# Patient Record
Sex: MALE | Race: OTHER | HISPANIC OR LATINO | URBAN - METROPOLITAN AREA
[De-identification: names, ages, dates, MRNs, and addresses within clinical notes are randomized per-mention and may not be internally consistent; named-entity substitution may affect disease eponyms.]

---

## 2017-01-04 ENCOUNTER — EMERGENCY (EMERGENCY)
Facility: HOSPITAL | Age: 42
LOS: 0 days | Discharge: HOME | End: 2017-01-05
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
## 2017-06-27 DIAGNOSIS — Z87.891 PERSONAL HISTORY OF NICOTINE DEPENDENCE: ICD-10-CM

## 2017-06-27 DIAGNOSIS — M25.512 PAIN IN LEFT SHOULDER: ICD-10-CM
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
## 2017-06-27 DIAGNOSIS — S42.492A OTHER DISPLACED FRACTURE OF LOWER END OF LEFT HUMERUS, INITIAL ENCOUNTER FOR CLOSED FRACTURE: ICD-10-CM
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
## 2017-11-22 ENCOUNTER — EMERGENCY (EMERGENCY)
Facility: HOSPITAL | Age: 42
LOS: 0 days | Discharge: HOME | End: 2017-11-22

## 2017-11-22 DIAGNOSIS — F10.10 ALCOHOL ABUSE, UNCOMPLICATED: ICD-10-CM

## 2017-11-22 DIAGNOSIS — M54.9 DORSALGIA, UNSPECIFIED: ICD-10-CM

## 2017-11-22 DIAGNOSIS — F17.200 NICOTINE DEPENDENCE, UNSPECIFIED, UNCOMPLICATED: ICD-10-CM

## 2017-11-22 DIAGNOSIS — F11.20 OPIOID DEPENDENCE, UNCOMPLICATED: ICD-10-CM

## 2017-11-22 DIAGNOSIS — Z79.899 OTHER LONG TERM (CURRENT) DRUG THERAPY: ICD-10-CM

## 2017-11-22 DIAGNOSIS — F10.20 ALCOHOL DEPENDENCE, UNCOMPLICATED: ICD-10-CM

## 2024-06-13 ENCOUNTER — EMERGENCY (EMERGENCY)
Facility: HOSPITAL | Age: 49
LOS: 0 days | Discharge: LEFT BEFORE DISCHARGE | End: 2024-06-13
Attending: STUDENT IN AN ORGANIZED HEALTH CARE EDUCATION/TRAINING PROGRAM
Payer: MEDICAID

## 2024-06-13 VITALS
OXYGEN SATURATION: 97 % | RESPIRATION RATE: 18 BRPM | TEMPERATURE: 99 F | DIASTOLIC BLOOD PRESSURE: 95 MMHG | SYSTOLIC BLOOD PRESSURE: 137 MMHG | HEART RATE: 89 BPM

## 2024-06-13 DIAGNOSIS — S81.801A UNSPECIFIED OPEN WOUND, RIGHT LOWER LEG, INITIAL ENCOUNTER: ICD-10-CM

## 2024-06-13 DIAGNOSIS — Z53.29 PROCEDURE AND TREATMENT NOT CARRIED OUT BECAUSE OF PATIENT'S DECISION FOR OTHER REASONS: ICD-10-CM

## 2024-06-13 DIAGNOSIS — K72.90 HEPATIC FAILURE, UNSPECIFIED WITHOUT COMA: ICD-10-CM
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
## 2024-06-13 DIAGNOSIS — R17 UNSPECIFIED JAUNDICE: ICD-10-CM

## 2024-06-13 LAB
ALBUMIN SERPL ELPH-MCNC: 2.6 G/DL — LOW (ref 3.5–5.2)
ALP SERPL-CCNC: 532 U/L — HIGH (ref 30–115)
ALT FLD-CCNC: 73 U/L — HIGH (ref 0–41)
ANION GAP SERPL CALC-SCNC: 14 MMOL/L — SIGNIFICANT CHANGE UP (ref 7–14)
AST SERPL-CCNC: 291 U/L — HIGH (ref 0–41)
BASOPHILS # BLD AUTO: 0.14 K/UL — SIGNIFICANT CHANGE UP (ref 0–0.2)
BASOPHILS NFR BLD AUTO: 1.1 % — HIGH (ref 0–1)
BILIRUB DIRECT SERPL-MCNC: >17.2 MG/DL — HIGH (ref 0–0.3)
BILIRUB INDIRECT FLD-MCNC: <7.3 MG/DL — HIGH (ref 0.2–1.2)
BILIRUB SERPL-MCNC: 24.5 MG/DL — CRITICAL HIGH (ref 0.2–1.2)
BUN SERPL-MCNC: 7 MG/DL — LOW (ref 10–20)
CALCIUM SERPL-MCNC: 8 MG/DL — LOW (ref 8.4–10.4)
CHLORIDE SERPL-SCNC: 96 MMOL/L — LOW (ref 98–110)
CO2 SERPL-SCNC: 25 MMOL/L — SIGNIFICANT CHANGE UP (ref 17–32)
CREAT SERPL-MCNC: 1 MG/DL — SIGNIFICANT CHANGE UP (ref 0.7–1.5)
EGFR: 93 ML/MIN/1.73M2 — SIGNIFICANT CHANGE UP
EOSINOPHIL # BLD AUTO: 0.08 K/UL — SIGNIFICANT CHANGE UP (ref 0–0.7)
EOSINOPHIL NFR BLD AUTO: 0.6 % — SIGNIFICANT CHANGE UP (ref 0–8)
GAS PNL BLDV: SIGNIFICANT CHANGE UP
GLUCOSE SERPL-MCNC: 66 MG/DL — LOW (ref 70–99)
HCT VFR BLD CALC: 31.8 % — LOW (ref 42–52)
HGB BLD-MCNC: 10.9 G/DL — LOW (ref 14–18)
IMM GRANULOCYTES NFR BLD AUTO: 2.6 % — HIGH (ref 0.1–0.3)
LIDOCAIN IGE QN: 24 U/L — SIGNIFICANT CHANGE UP (ref 7–60)
LYMPHOCYTES # BLD AUTO: 1.73 K/UL — SIGNIFICANT CHANGE UP (ref 1.2–3.4)
LYMPHOCYTES # BLD AUTO: 14 % — LOW (ref 20.5–51.1)
MAGNESIUM SERPL-MCNC: 2 MG/DL — SIGNIFICANT CHANGE UP (ref 1.8–2.4)
MCHC RBC-ENTMCNC: 34.3 G/DL — SIGNIFICANT CHANGE UP (ref 32–37)
MCHC RBC-ENTMCNC: 41 PG — HIGH (ref 27–31)
MCV RBC AUTO: 119.5 FL — HIGH (ref 80–94)
MONOCYTES # BLD AUTO: 0.9 K/UL — HIGH (ref 0.1–0.6)
MONOCYTES NFR BLD AUTO: 7.3 % — SIGNIFICANT CHANGE UP (ref 1.7–9.3)
NEUTROPHILS # BLD AUTO: 9.15 K/UL — HIGH (ref 1.4–6.5)
NEUTROPHILS NFR BLD AUTO: 74.4 % — SIGNIFICANT CHANGE UP (ref 42.2–75.2)
NRBC # BLD: 0 /100 WBCS — SIGNIFICANT CHANGE UP (ref 0–0)
PLATELET # BLD AUTO: 179 K/UL — SIGNIFICANT CHANGE UP (ref 130–400)
PMV BLD: 11.9 FL — HIGH (ref 7.4–10.4)
POTASSIUM SERPL-MCNC: 3.8 MMOL/L — SIGNIFICANT CHANGE UP (ref 3.5–5)
POTASSIUM SERPL-SCNC: 3.8 MMOL/L — SIGNIFICANT CHANGE UP (ref 3.5–5)
PROT SERPL-MCNC: 5.8 G/DL — LOW (ref 6–8)
RBC # BLD: 2.66 M/UL — LOW (ref 4.7–6.1)
RBC # FLD: 16 % — HIGH (ref 11.5–14.5)
SODIUM SERPL-SCNC: 135 MMOL/L — SIGNIFICANT CHANGE UP (ref 135–146)
WBC # BLD: 12.32 K/UL — HIGH (ref 4.8–10.8)
WBC # FLD AUTO: 12.32 K/UL — HIGH (ref 4.8–10.8)

## 2024-06-13 PROCEDURE — 99285 EMERGENCY DEPT VISIT HI MDM: CPT

## 2024-06-13 PROCEDURE — 36415 COLL VENOUS BLD VENIPUNCTURE: CPT

## 2024-06-13 PROCEDURE — 99284 EMERGENCY DEPT VISIT MOD MDM: CPT | Mod: 25

## 2024-06-13 PROCEDURE — 84295 ASSAY OF SERUM SODIUM: CPT

## 2024-06-13 PROCEDURE — 80076 HEPATIC FUNCTION PANEL: CPT

## 2024-06-13 PROCEDURE — 85014 HEMATOCRIT: CPT

## 2024-06-13 PROCEDURE — 82330 ASSAY OF CALCIUM: CPT

## 2024-06-13 PROCEDURE — 74177 CT ABD & PELVIS W/CONTRAST: CPT | Mod: 26,MC

## 2024-06-13 PROCEDURE — 85018 HEMOGLOBIN: CPT

## 2024-06-13 PROCEDURE — 84132 ASSAY OF SERUM POTASSIUM: CPT

## 2024-06-13 PROCEDURE — 85025 COMPLETE CBC W/AUTO DIFF WBC: CPT

## 2024-06-13 PROCEDURE — 74177 CT ABD & PELVIS W/CONTRAST: CPT | Mod: MC

## 2024-06-13 PROCEDURE — 83605 ASSAY OF LACTIC ACID: CPT

## 2024-06-13 PROCEDURE — 83735 ASSAY OF MAGNESIUM: CPT

## 2024-06-13 PROCEDURE — 82962 GLUCOSE BLOOD TEST: CPT

## 2024-06-13 PROCEDURE — 80048 BASIC METABOLIC PNL TOTAL CA: CPT

## 2024-06-13 PROCEDURE — 83690 ASSAY OF LIPASE: CPT

## 2024-06-13 RX ORDER — CEPHALEXIN 500 MG
500 CAPSULE ORAL ONCE
Refills: 0 | Status: DISCONTINUED | OUTPATIENT
Start: 2024-06-13 | End: 2024-06-13

## 2024-06-13 RX ORDER — CEPHALEXIN 500 MG
1 CAPSULE ORAL
Qty: 28 | Refills: 0
Start: 2024-06-13 | End: 2024-06-19

## 2024-06-13 NOTE — ED ADULT NURSE NOTE - OBJECTIVE STATEMENT
48y male c/o lower right leg wound that won't heal after injury 2.5 weeks ago, pt report heat/pain on the injury site. denies n/v/d/fever/chills at home. pt alert and awake speaking in full sentences. 48y male c/o lower right leg wound that won't heal after injury 2.5 weeks ago, pt report heat/pain on the injury site. denies n/v/d/fever/chills at home. pt alert and awake speaking in full sentences. pt appear juandice on skin.

## 2024-06-13 NOTE — ED PROVIDER NOTE - PATIENT PORTAL LINK FT
You can access the FollowMyHealth Patient Portal offered by [facility] by registering at the following website: [email or web address]. By joining [employer]’s FollowMyHealth portal, you will also be able to view your health information using other applications (apps) compatible with our system.

## 2024-06-13 NOTE — ED PROVIDER NOTE - OBJECTIVE STATEMENT
48-year-old male with past medical history EtOH abuse and heroin abuse who presents to the ED with left lower leg wound.  Reports that he noticed the wound since 2 weeks ago and is not healing, so came to the ED for evaluation.  Denies trauma or injury to that area.  Reports that he drinks 1 pint of vodka daily and last alcohol intake was earlier this morning.  Also reports that he snorts heroin and never injecting himself.  Denies fever, shortness of breath, chest pain, nausea, vomiting, abdominal pain, urinary symptoms, and change with bowel movement.

## 2024-06-13 NOTE — ED PROVIDER NOTE - NSFOLLOWUPINSTRUCTIONS_ED_ALL_ED_FT
Follow up with your liver doctor or return to hospital as soon as possible because your liver function tests are very abnormal     Cirrhosis    Cirrhosis is long-term (chronic) liver injury. The liver is the body's largest internal organ, and it performs many functions. It converts food into energy, removes toxic material from the blood, makes important proteins, and absorbs necessary vitamins from food.    In cirrhosis, healthy liver cells are replaced by scar tissue. This prevents blood from flowing through the liver and makes it difficult for the liver to complete its functions.    What are the causes?  Common causes of this condition are hepatitis C and long-term alcohol abuse. Other causes include:  Nonalcoholic fatty liver disease (NAFLD). This happens when fat is deposited in the liver by causes other than alcohol.  Hepatitis B infection.  Autoimmune hepatitis. In this condition, the body's defense system (immune system) mistakenly attacks the liver cells, causing inflammation.  Diseases that cause blockage of ducts inside the liver.  Inherited liver diseases, such as hemochromatosis. This is one of the most common inherited liver diseases. In this disease, deposits of iron collect in the liver and other organs.  Reactions to certain long-term medicines, such as amiodarone, a heart medicine.  Parasitic infections. These include schistosomiasis, which is caused by a flatworm.  Long-term contact to certain toxins. These toxins include certain organic solvents, such as toluene and chloroform.  What increases the risk?  You are more likely to develop this condition if:  You have certain types of viral hepatitis.  You abuse alcohol, especially if you are female.  You are overweight.  You use IV drugs and share needles.  You have unprotected sex with someone who has viral hepatitis.  What are the signs or symptoms?  You may not have any signs and symptoms at first. Symptoms may not develop until the damage to your liver starts to get worse.    Early symptoms may include:  Weakness and tiredness (fatigue).  Changes in sleep patterns or having trouble sleeping.  Itchiness.  Tenderness in the right-upper part of your abdomen.  Weight loss and muscle loss.  Nausea.  Loss of appetite.  Later symptoms may include:  Fatigue or weakness that is getting worse.  Yellow skin and eyes (jaundice).  Buildup of fluid in the abdomen (ascites). You may notice that your clothes are tight around your waist.  Weight gain and swelling of the feet and ankles (edema).  Trouble breathing.  Easy bruising and bleeding.  Vomiting blood, or black or bloody stool.  Mental confusion.  How is this diagnosed?  Your health care provider may suspect cirrhosis based on your symptoms and medical history, especially if you have other medical conditions or a history of alcohol abuse. Your health care provider will do a physical exam to feel your liver and to check for signs of cirrhosis. Tests may include:  Blood tests to check:  For hepatitis B or C.  Kidney function.  Liver function.  Imaging tests such as:  MRI or CT scan to look for changes seen in advanced cirrhosis.  Ultrasound to see if normal liver tissue is being replaced by scar tissue.  A procedure in which a long needle is used to take a sample of liver tissue to be checked in a lab (biopsy). Liver biopsy can confirm the diagnosis of cirrhosis.  How is this treated?  Treatment for this condition depends on how damaged your liver is and what caused the damage. It may include treating the symptoms of cirrhosis, or treating the underlying causes to slow the damage. Treatment may include:  Making lifestyle changes, such as:  Eating a healthy diet. You may need to work with your health care provider or a dietitian to develop an eating plan.  Restricting salt intake.  Maintaining a healthy weight.  Not abusing drugs or alcohol.  Taking medicines to:  Treat liver infections or other infections.  Control itching.  Reduce fluid buildup.  Reduce certain blood toxins.  Reduce risk of bleeding from enlarged blood vessels in the stomach or esophagus (varices).  Liver transplant. In this procedure, a liver from a donor is used to replace your diseased liver. This is done if cirrhosis has caused liver failure.  Other treatments and procedures may be done depending on the problems that you get from cirrhosis. Common problems include liver-related kidney failure (hepatorenal syndrome).    Follow these instructions at home:    Take medicines only as told by your health care provider. Do not use medicines that are toxic to your liver. Ask your health care provider before taking any new medicines, including over-the-counter medicines such as NSAIDs.  Rest as needed.  Eat a well-balanced diet.  Limit your salt or water intake, if your health care provider asks you to do this.  Do not drink alcohol. This is especially important if you routinely take acetaminophen.  Keep all follow-up visits. This is important.  Contact a health care provider if you:  Have fatigue or weakness that is getting worse.  Develop swelling of the hands, feet, or legs, or a buildup of fluid in the abdomen (ascites).  Have a fever or chills.  Develop loss of appetite.  Have nausea or vomiting.  Develop jaundice.  Develop easy bruising or bleeding.  Get help right away if you:  Vomit bright red blood or a material that looks like coffee grounds.  Have blood in your stools.  Notice that your stools appear black and tarry.  Become confused.  Have chest pain or trouble breathing.  These symptoms may represent a serious problem that is an emergency. Do not wait to see if the symptoms will go away. Get medical help right away. Call your local emergency services (911 in the [place] Do not drive yourself to the hospital.    Summary  Cirrhosis is chronic liver injury. Common causes are hepatitis C and long-term alcohol abuse.  Tests used to diagnose cirrhosis include blood tests, imaging tests, and liver biopsy.  Treatment for this condition involves treating the underlying cause. Avoid alcohol, drugs, salt, and medicines that may damage your liver.  Get help right away if you vomit bright red blood or a material that looks like coffee grounds.  This information is not intended to replace advice given to you by your health care provider. Make sure you discuss any questions you have with your health care provider.

## 2024-06-13 NOTE — ED PROVIDER NOTE - PROGRESS NOTE DETAILS
Authored by Dr. Rajendra Kennedy, emergency medicine attending physician- I discussed results w/ pt, concern for decompensated cirrhosis/jaundice. pt states he cannot stay overnight because he has a dog but he will come back tomorrow. will ama, pt w/ capacity to refuse. I and/or my team has had an extensive discussion of risks and benefits of pursuing further medical evaluation and/or care with patient and any available family/friends, including but not limited to worsening of clinical status, disability, and death; patient still electing to leave against medical advice. Patient is awake, alert, oriented and demonstrates full capacity and insight into illness. Patient aware and encouraged to return immediately to this ED or nearest ED if patient decides to change mind regarding care or if patient experiences any new, worsening, or concerning symptoms. Any available test results were discussed with patient and/or family. Verbal instructions given, patient endorsed understanding. Written discharge instructions additionally given, including follow-up plan.

## 2024-06-13 NOTE — ED ADULT NURSE NOTE - NSFALLRISKINTERV_ED_ALL_ED

## 2024-06-13 NOTE — ED PROVIDER NOTE - ATTENDING APP SHARED VISIT CONTRIBUTION OF CARE
49 yo m hx etoh use disorder, mmtp  pt here for R leg wound. pt noticed wound 2 weeks ago. wound appears similar but pt wanted assessment. no fevers/chills.  no ap/n/v.      vss  gen- NAD, aaox3  card-rrr  lungs-ctab, no wheezing or rhonchi  abd-sntnd, no guarding or rebound  neuro- full str/sensation, cn ii-xii grossly intact, normal coordination  skin- jaundiced 49 yo m hx etoh use disorder, mmtp  pt here for R leg wound. pt noticed wound 2 weeks ago. wound appears similar but pt wanted assessment. no fevers/chills.  no ap/n/v.      vss  gen- NAD, aaox3  card-rrr  lungs-ctab, no wheezing or rhonchi  abd-sntnd, no guarding or rebound  neuro- full str/sensation, cn ii-xii grossly intact, normal coordination  skin- jaundiced, R leg w/ quarter sized ulcerative lesion w/ mild tenderness

## 2024-06-13 NOTE — ED PROVIDER NOTE - PHYSICAL EXAMINATION
CONSTITUTIONAL: Appears jaundice; in no apparent distress.   ENT: Hearing is intact with good acuity to spoken voice.  Patient is speaking clearly, not muffled and airway is intact.   RESPIRATORY: No signs of respiratory distress. Lung sounds are clear in all lobes bilaterally without rales, rhonchi, or wheezes.  CARDIOVASCULAR: Regular rate and rhythm.   GI: Abdomen is soft, non-tender, and without distention. Bowel sounds are present and normoactive in all four quadrants. No masses are noted.   MS: RLE noticed with 2cm x 2 cm superficial open wound with no active discharge or bleeding or surrounding erythema noticed; nontender to palpation; full ROM; sensory function intact; distal pulse present. Rest of the upper and lower extremities unremarkable.   NEURO: A & O x 3. Normal speech. No focal deficit.  PSYCHOLOGICAL: Appropriate mood and affect. Good judgement and insight.

## 2024-06-13 NOTE — ED PROVIDER NOTE - CLINICAL SUMMARY MEDICAL DECISION MAKING FREE TEXT BOX
Throughout ED observation period, pt remained clinically and hemodynamically stable.  pt awake, alert, oriented  labs c/f liver failure, discussed this w/ pt who states he cannot stay because he has a dog but he will return to hospital once his dog can be taken care of. lidia prado

## 2024-06-19 ENCOUNTER — INPATIENT (INPATIENT)
Facility: HOSPITAL | Age: 49
LOS: 6 days | Discharge: HOME | DRG: 720 | End: 2024-06-26
Attending: INTERNAL MEDICINE | Admitting: HOSPITALIST
Payer: MEDICAID

## 2024-06-19 VITALS
TEMPERATURE: 100 F | HEART RATE: 95 BPM | WEIGHT: 190.04 LBS | DIASTOLIC BLOOD PRESSURE: 78 MMHG | RESPIRATION RATE: 18 BRPM | OXYGEN SATURATION: 95 % | SYSTOLIC BLOOD PRESSURE: 130 MMHG

## 2024-06-19 DIAGNOSIS — A41.9 SEPSIS, UNSPECIFIED ORGANISM: ICD-10-CM

## 2024-06-19 DIAGNOSIS — L03.90 CELLULITIS, UNSPECIFIED: ICD-10-CM

## 2024-06-19 DIAGNOSIS — R09.89 OTHER SPECIFIED SYMPTOMS AND SIGNS INVOLVING THE CIRCULATORY AND RESPIRATORY SYSTEMS: ICD-10-CM

## 2024-06-19 LAB
ALBUMIN SERPL ELPH-MCNC: 2.6 G/DL — LOW (ref 3.5–5.2)
ALP SERPL-CCNC: 459 U/L — HIGH (ref 30–115)
ALT FLD-CCNC: 53 U/L — HIGH (ref 0–41)
ANION GAP SERPL CALC-SCNC: 14 MMOL/L — SIGNIFICANT CHANGE UP (ref 7–14)
APTT BLD: 33.2 SEC — SIGNIFICANT CHANGE UP (ref 27–39.2)
AST SERPL-CCNC: 193 U/L — HIGH (ref 0–41)
BASOPHILS # BLD AUTO: 0.11 K/UL — SIGNIFICANT CHANGE UP (ref 0–0.2)
BASOPHILS NFR BLD AUTO: 0.7 % — SIGNIFICANT CHANGE UP (ref 0–1)
BILIRUB DIRECT SERPL-MCNC: >17.2 MG/DL — HIGH (ref 0–0.3)
BILIRUB INDIRECT FLD-MCNC: <8.8 MG/DL — HIGH (ref 0.2–1.2)
BILIRUB SERPL-MCNC: 26 MG/DL — CRITICAL HIGH (ref 0.2–1.2)
BUN SERPL-MCNC: 12 MG/DL — SIGNIFICANT CHANGE UP (ref 10–20)
CALCIUM SERPL-MCNC: 8.1 MG/DL — LOW (ref 8.4–10.4)
CHLORIDE SERPL-SCNC: 99 MMOL/L — SIGNIFICANT CHANGE UP (ref 98–110)
CO2 SERPL-SCNC: 23 MMOL/L — SIGNIFICANT CHANGE UP (ref 17–32)
CREAT SERPL-MCNC: 1 MG/DL — SIGNIFICANT CHANGE UP (ref 0.7–1.5)
EGFR: 93 ML/MIN/1.73M2 — SIGNIFICANT CHANGE UP
EOSINOPHIL # BLD AUTO: 0.06 K/UL — SIGNIFICANT CHANGE UP (ref 0–0.7)
EOSINOPHIL NFR BLD AUTO: 0.4 % — SIGNIFICANT CHANGE UP (ref 0–8)
FLUAV AG NPH QL: SIGNIFICANT CHANGE UP
FLUBV AG NPH QL: SIGNIFICANT CHANGE UP
GLUCOSE SERPL-MCNC: 73 MG/DL — SIGNIFICANT CHANGE UP (ref 70–99)
HCT VFR BLD CALC: 35.4 % — LOW (ref 42–52)
HGB BLD-MCNC: 12.2 G/DL — LOW (ref 14–18)
IMM GRANULOCYTES NFR BLD AUTO: 2.4 % — HIGH (ref 0.1–0.3)
INR BLD: 1.37 RATIO — HIGH (ref 0.65–1.3)
LACTATE SERPL-SCNC: 2.4 MMOL/L — HIGH (ref 0.7–2)
LACTATE SERPL-SCNC: 2.6 MMOL/L — HIGH (ref 0.7–2)
LIDOCAIN IGE QN: 42 U/L — SIGNIFICANT CHANGE UP (ref 7–60)
LYMPHOCYTES # BLD AUTO: 0.78 K/UL — LOW (ref 1.2–3.4)
LYMPHOCYTES # BLD AUTO: 4.7 % — LOW (ref 20.5–51.1)
MAGNESIUM SERPL-MCNC: 2 MG/DL — SIGNIFICANT CHANGE UP (ref 1.8–2.4)
MCHC RBC-ENTMCNC: 34.5 G/DL — SIGNIFICANT CHANGE UP (ref 32–37)
MCHC RBC-ENTMCNC: 41.2 PG — HIGH (ref 27–31)
MCV RBC AUTO: 119.6 FL — HIGH (ref 80–94)
MONOCYTES # BLD AUTO: 0.88 K/UL — HIGH (ref 0.1–0.6)
MONOCYTES NFR BLD AUTO: 5.3 % — SIGNIFICANT CHANGE UP (ref 1.7–9.3)
NEUTROPHILS # BLD AUTO: 14.26 K/UL — HIGH (ref 1.4–6.5)
NEUTROPHILS NFR BLD AUTO: 86.5 % — HIGH (ref 42.2–75.2)
NRBC # BLD: 0 /100 WBCS — SIGNIFICANT CHANGE UP (ref 0–0)
PLATELET # BLD AUTO: 170 K/UL — SIGNIFICANT CHANGE UP (ref 130–400)
PMV BLD: 11.8 FL — HIGH (ref 7.4–10.4)
POTASSIUM SERPL-MCNC: 2.9 MMOL/L — LOW (ref 3.5–5)
POTASSIUM SERPL-SCNC: 2.9 MMOL/L — LOW (ref 3.5–5)
PROT SERPL-MCNC: 5.6 G/DL — LOW (ref 6–8)
PROTHROM AB SERPL-ACNC: 15.7 SEC — HIGH (ref 9.95–12.87)
RBC # BLD: 2.96 M/UL — LOW (ref 4.7–6.1)
RBC # FLD: 16.8 % — HIGH (ref 11.5–14.5)
RSV RNA NPH QL NAA+NON-PROBE: SIGNIFICANT CHANGE UP
SARS-COV-2 RNA SPEC QL NAA+PROBE: SIGNIFICANT CHANGE UP
SODIUM SERPL-SCNC: 136 MMOL/L — SIGNIFICANT CHANGE UP (ref 135–146)
WBC # BLD: 16.49 K/UL — HIGH (ref 4.8–10.8)
WBC # FLD AUTO: 16.49 K/UL — HIGH (ref 4.8–10.8)

## 2024-06-19 PROCEDURE — 84540 ASSAY OF URINE/UREA-N: CPT

## 2024-06-19 PROCEDURE — 84439 ASSAY OF FREE THYROXINE: CPT

## 2024-06-19 PROCEDURE — 93970 EXTREMITY STUDY: CPT | Mod: 26

## 2024-06-19 PROCEDURE — 82570 ASSAY OF URINE CREATININE: CPT

## 2024-06-19 PROCEDURE — 80053 COMPREHEN METABOLIC PANEL: CPT

## 2024-06-19 PROCEDURE — 97116 GAIT TRAINING THERAPY: CPT | Mod: GP

## 2024-06-19 PROCEDURE — 84133 ASSAY OF URINE POTASSIUM: CPT

## 2024-06-19 PROCEDURE — 86901 BLOOD TYPING SEROLOGIC RH(D): CPT

## 2024-06-19 PROCEDURE — 93005 ELECTROCARDIOGRAM TRACING: CPT

## 2024-06-19 PROCEDURE — 85652 RBC SED RATE AUTOMATED: CPT

## 2024-06-19 PROCEDURE — 82945 GLUCOSE OTHER FLUID: CPT

## 2024-06-19 PROCEDURE — 84443 ASSAY THYROID STIM HORMONE: CPT

## 2024-06-19 PROCEDURE — 88112 CYTOPATH CELL ENHANCE TECH: CPT

## 2024-06-19 PROCEDURE — 87086 URINE CULTURE/COLONY COUNT: CPT

## 2024-06-19 PROCEDURE — 82607 VITAMIN B-12: CPT

## 2024-06-19 PROCEDURE — 86140 C-REACTIVE PROTEIN: CPT

## 2024-06-19 PROCEDURE — 83540 ASSAY OF IRON: CPT

## 2024-06-19 PROCEDURE — 82247 BILIRUBIN TOTAL: CPT

## 2024-06-19 PROCEDURE — 83605 ASSAY OF LACTIC ACID: CPT

## 2024-06-19 PROCEDURE — 84300 ASSAY OF URINE SODIUM: CPT

## 2024-06-19 PROCEDURE — 36415 COLL VENOUS BLD VENIPUNCTURE: CPT

## 2024-06-19 PROCEDURE — 87102 FUNGUS ISOLATION CULTURE: CPT

## 2024-06-19 PROCEDURE — 89051 BODY FLUID CELL COUNT: CPT

## 2024-06-19 PROCEDURE — 83735 ASSAY OF MAGNESIUM: CPT

## 2024-06-19 PROCEDURE — 86850 RBC ANTIBODY SCREEN: CPT

## 2024-06-19 PROCEDURE — 85025 COMPLETE CBC W/AUTO DIFF WBC: CPT

## 2024-06-19 PROCEDURE — 83550 IRON BINDING TEST: CPT

## 2024-06-19 PROCEDURE — P9047: CPT

## 2024-06-19 PROCEDURE — 83615 LACTATE (LD) (LDH) ENZYME: CPT

## 2024-06-19 PROCEDURE — 71045 X-RAY EXAM CHEST 1 VIEW: CPT | Mod: 26

## 2024-06-19 PROCEDURE — 80074 ACUTE HEPATITIS PANEL: CPT

## 2024-06-19 PROCEDURE — 84100 ASSAY OF PHOSPHORUS: CPT

## 2024-06-19 PROCEDURE — 83935 ASSAY OF URINE OSMOLALITY: CPT

## 2024-06-19 PROCEDURE — 87075 CULTR BACTERIA EXCEPT BLOOD: CPT

## 2024-06-19 PROCEDURE — 76705 ECHO EXAM OF ABDOMEN: CPT

## 2024-06-19 PROCEDURE — 81001 URINALYSIS AUTO W/SCOPE: CPT

## 2024-06-19 PROCEDURE — 82746 ASSAY OF FOLIC ACID SERUM: CPT

## 2024-06-19 PROCEDURE — 82728 ASSAY OF FERRITIN: CPT

## 2024-06-19 PROCEDURE — 99223 1ST HOSP IP/OBS HIGH 75: CPT

## 2024-06-19 PROCEDURE — 80354 DRUG SCREENING FENTANYL: CPT

## 2024-06-19 PROCEDURE — 84157 ASSAY OF PROTEIN OTHER: CPT

## 2024-06-19 PROCEDURE — 83036 HEMOGLOBIN GLYCOSYLATED A1C: CPT

## 2024-06-19 PROCEDURE — 80061 LIPID PANEL: CPT

## 2024-06-19 PROCEDURE — 80358 DRUG SCREENING METHADONE: CPT

## 2024-06-19 PROCEDURE — 80307 DRUG TEST PRSMV CHEM ANLYZR: CPT

## 2024-06-19 PROCEDURE — 82140 ASSAY OF AMMONIA: CPT

## 2024-06-19 PROCEDURE — 74177 CT ABD & PELVIS W/CONTRAST: CPT | Mod: 26,MC

## 2024-06-19 PROCEDURE — 93010 ELECTROCARDIOGRAM REPORT: CPT

## 2024-06-19 PROCEDURE — 99285 EMERGENCY DEPT VISIT HI MDM: CPT

## 2024-06-19 PROCEDURE — 87799 DETECT AGENT NOS DNA QUANT: CPT

## 2024-06-19 PROCEDURE — 97162 PT EVAL MOD COMPLEX 30 MIN: CPT | Mod: GP

## 2024-06-19 PROCEDURE — 82248 BILIRUBIN DIRECT: CPT

## 2024-06-19 PROCEDURE — 85730 THROMBOPLASTIN TIME PARTIAL: CPT

## 2024-06-19 PROCEDURE — 85610 PROTHROMBIN TIME: CPT

## 2024-06-19 PROCEDURE — 82962 GLUCOSE BLOOD TEST: CPT

## 2024-06-19 PROCEDURE — 84156 ASSAY OF PROTEIN URINE: CPT

## 2024-06-19 PROCEDURE — 80076 HEPATIC FUNCTION PANEL: CPT

## 2024-06-19 PROCEDURE — 80048 BASIC METABOLIC PNL TOTAL CA: CPT

## 2024-06-19 PROCEDURE — 97530 THERAPEUTIC ACTIVITIES: CPT | Mod: GP

## 2024-06-19 PROCEDURE — 87070 CULTURE OTHR SPECIMN AEROBIC: CPT

## 2024-06-19 PROCEDURE — 87389 HIV-1 AG W/HIV-1&-2 AB AG IA: CPT

## 2024-06-19 PROCEDURE — 80349 CANNABINOIDS NATURAL: CPT

## 2024-06-19 PROCEDURE — 82042 OTHER SOURCE ALBUMIN QUAN EA: CPT

## 2024-06-19 PROCEDURE — 86900 BLOOD TYPING SEROLOGIC ABO: CPT

## 2024-06-19 PROCEDURE — 87205 SMEAR GRAM STAIN: CPT

## 2024-06-19 RX ORDER — ACETAMINOPHEN 325 MG
650 TABLET ORAL EVERY 6 HOURS
Refills: 0 | Status: DISCONTINUED | OUTPATIENT
Start: 2024-06-19 | End: 2024-06-20

## 2024-06-19 RX ORDER — METRONIDAZOLE 500 MG/1
500 TABLET ORAL ONCE
Refills: 0 | Status: COMPLETED | OUTPATIENT
Start: 2024-06-19 | End: 2024-06-19

## 2024-06-19 RX ORDER — DIAZEPAM 10 MG/1
5 TABLET ORAL ONCE
Refills: 0 | Status: DISCONTINUED | OUTPATIENT
Start: 2024-06-19 | End: 2024-06-19

## 2024-06-19 RX ORDER — ENOXAPARIN SODIUM 100 MG/ML
40 INJECTION SUBCUTANEOUS EVERY 24 HOURS
Refills: 0 | Status: DISCONTINUED | OUTPATIENT
Start: 2024-06-19 | End: 2024-06-26

## 2024-06-19 RX ORDER — CYANOCOBALAMIN (VITAMIN B-12) 1000 MCG
1000 TABLET, EXTENDED RELEASE ORAL DAILY
Refills: 0 | Status: DISCONTINUED | OUTPATIENT
Start: 2024-06-19 | End: 2024-06-24

## 2024-06-19 RX ORDER — FOLIC ACID
1 POWDER (GRAM) MISCELLANEOUS DAILY
Refills: 0 | Status: DISCONTINUED | OUTPATIENT
Start: 2024-06-19 | End: 2024-06-24

## 2024-06-19 RX ORDER — LORAZEPAM 0.5 MG
1 TABLET ORAL
Refills: 0 | Status: DISCONTINUED | OUTPATIENT
Start: 2024-06-19 | End: 2024-06-20

## 2024-06-19 RX ORDER — CEFTRIAXONE SODIUM 500 MG
2000 VIAL (EA) INJECTION EVERY 24 HOURS
Refills: 0 | Status: DISCONTINUED | OUTPATIENT
Start: 2024-06-20 | End: 2024-06-22

## 2024-06-19 RX ORDER — THIAMINE HCL 100 MG
100 TABLET ORAL DAILY
Refills: 0 | Status: COMPLETED | OUTPATIENT
Start: 2024-06-19 | End: 2024-06-22

## 2024-06-19 RX ORDER — POTASSIUM CHLORIDE 600 MG/1
40 TABLET, FILM COATED, EXTENDED RELEASE ORAL ONCE
Refills: 0 | Status: COMPLETED | OUTPATIENT
Start: 2024-06-19 | End: 2024-06-19

## 2024-06-19 RX ORDER — VANCOMYCIN HYDROCHLORIDE 50 MG/ML
1000 KIT ORAL ONCE
Refills: 0 | Status: COMPLETED | OUTPATIENT
Start: 2024-06-19 | End: 2024-06-19

## 2024-06-19 RX ORDER — GABAPENTIN
1 POWDER (GRAM) MISCELLANEOUS
Refills: 0 | DISCHARGE

## 2024-06-19 RX ORDER — METRONIDAZOLE 500 MG/1
500 TABLET ORAL EVERY 8 HOURS
Refills: 0 | Status: DISCONTINUED | OUTPATIENT
Start: 2024-06-19 | End: 2024-06-22

## 2024-06-19 RX ORDER — DEXTROSE MONOHYDRATE AND SODIUM CHLORIDE 5; .3 G/100ML; G/100ML
1500 INJECTION, SOLUTION INTRAVENOUS ONCE
Refills: 0 | Status: COMPLETED | OUTPATIENT
Start: 2024-06-19 | End: 2024-06-19

## 2024-06-19 RX ORDER — CEFEPIME 1 G/1
2000 INJECTION, POWDER, FOR SOLUTION INTRAMUSCULAR; INTRAVENOUS ONCE
Refills: 0 | Status: COMPLETED | OUTPATIENT
Start: 2024-06-19 | End: 2024-06-19

## 2024-06-19 RX ORDER — DEXTROSE MONOHYDRATE AND SODIUM CHLORIDE 5; .3 G/100ML; G/100ML
1000 INJECTION, SOLUTION INTRAVENOUS ONCE
Refills: 0 | Status: COMPLETED | OUTPATIENT
Start: 2024-06-19 | End: 2024-06-19

## 2024-06-19 RX ORDER — PANTOPRAZOLE SODIUM 40 MG/10ML
40 INJECTION, POWDER, FOR SOLUTION INTRAVENOUS
Refills: 0 | Status: DISCONTINUED | OUTPATIENT
Start: 2024-06-19 | End: 2024-06-26

## 2024-06-19 RX ORDER — CEFAZOLIN 10 G/1
2000 INJECTION, POWDER, FOR SOLUTION INTRAVENOUS ONCE
Refills: 0 | Status: DISCONTINUED | OUTPATIENT
Start: 2024-06-19 | End: 2024-06-19

## 2024-06-19 RX ORDER — GABAPENTIN
300 POWDER (GRAM) MISCELLANEOUS
Refills: 0 | Status: DISCONTINUED | OUTPATIENT
Start: 2024-06-19 | End: 2024-06-26

## 2024-06-19 RX ADMIN — DEXTROSE MONOHYDRATE AND SODIUM CHLORIDE 1000 MILLILITER(S): 5; .3 INJECTION, SOLUTION INTRAVENOUS at 17:00

## 2024-06-19 RX ADMIN — POTASSIUM CHLORIDE 40 MILLIEQUIVALENT(S): 600 TABLET, FILM COATED, EXTENDED RELEASE ORAL at 21:57

## 2024-06-19 RX ADMIN — DIAZEPAM 5 MILLIGRAM(S): 10 TABLET ORAL at 18:18

## 2024-06-19 RX ADMIN — DEXTROSE MONOHYDRATE AND SODIUM CHLORIDE 1000 MILLILITER(S): 5; .3 INJECTION, SOLUTION INTRAVENOUS at 18:25

## 2024-06-19 RX ADMIN — DEXTROSE MONOHYDRATE AND SODIUM CHLORIDE 1500 MILLILITER(S): 5; .3 INJECTION, SOLUTION INTRAVENOUS at 19:45

## 2024-06-19 RX ADMIN — DEXTROSE MONOHYDRATE AND SODIUM CHLORIDE 1500 MILLILITER(S): 5; .3 INJECTION, SOLUTION INTRAVENOUS at 18:21

## 2024-06-19 RX ADMIN — POTASSIUM CHLORIDE 40 MILLIEQUIVALENT(S): 600 TABLET, FILM COATED, EXTENDED RELEASE ORAL at 18:19

## 2024-06-19 RX ADMIN — VANCOMYCIN HYDROCHLORIDE 250 MILLIGRAM(S): KIT at 18:17

## 2024-06-19 RX ADMIN — CEFEPIME 100 MILLIGRAM(S): 1 INJECTION, POWDER, FOR SOLUTION INTRAMUSCULAR; INTRAVENOUS at 18:18

## 2024-06-19 RX ADMIN — METRONIDAZOLE 500 MILLIGRAM(S): 500 TABLET ORAL at 18:19

## 2024-06-20 ENCOUNTER — RESULT REVIEW (OUTPATIENT)
Age: 49
End: 2024-06-20

## 2024-06-20 LAB
A1C WITH ESTIMATED AVERAGE GLUCOSE RESULT: <4.3 % — SIGNIFICANT CHANGE UP (ref 4–5.6)
ALBUMIN SERPL ELPH-MCNC: 2.2 G/DL — LOW (ref 3.5–5.2)
ALBUMIN SERPL ELPH-MCNC: 2.3 G/DL — LOW (ref 3.5–5.2)
ALBUMIN SERPL ELPH-MCNC: 2.3 G/DL — LOW (ref 3.5–5.2)
ALP SERPL-CCNC: 356 U/L — HIGH (ref 30–115)
ALP SERPL-CCNC: 359 U/L — HIGH (ref 30–115)
ALP SERPL-CCNC: 373 U/L — HIGH (ref 30–115)
ALT FLD-CCNC: 41 U/L — SIGNIFICANT CHANGE UP (ref 0–41)
ALT FLD-CCNC: 42 U/L — HIGH (ref 0–41)
ALT FLD-CCNC: 44 U/L — HIGH (ref 0–41)
AMMONIA BLD-MCNC: 47 UMOL/L — SIGNIFICANT CHANGE UP (ref 11–55)
AMPHET UR-MCNC: NEGATIVE — SIGNIFICANT CHANGE UP
ANION GAP SERPL CALC-SCNC: 10 MMOL/L — SIGNIFICANT CHANGE UP (ref 7–14)
ANION GAP SERPL CALC-SCNC: 12 MMOL/L — SIGNIFICANT CHANGE UP (ref 7–14)
ANION GAP SERPL CALC-SCNC: 13 MMOL/L — SIGNIFICANT CHANGE UP (ref 7–14)
ANION GAP SERPL CALC-SCNC: 8 MMOL/L — SIGNIFICANT CHANGE UP (ref 7–14)
APPEARANCE UR: ABNORMAL
AST SERPL-CCNC: 161 U/L — HIGH (ref 0–41)
AST SERPL-CCNC: 161 U/L — HIGH (ref 0–41)
AST SERPL-CCNC: 170 U/L — HIGH (ref 0–41)
B PERT IGG+IGM PNL SER: CLEAR — SIGNIFICANT CHANGE UP
BARBITURATES UR SCN-MCNC: NEGATIVE — SIGNIFICANT CHANGE UP
BASOPHILS # BLD AUTO: 0.09 K/UL — SIGNIFICANT CHANGE UP (ref 0–0.2)
BASOPHILS NFR BLD AUTO: 0.9 % — SIGNIFICANT CHANGE UP (ref 0–1)
BENZODIAZ UR-MCNC: NEGATIVE — SIGNIFICANT CHANGE UP
BILIRUB DIRECT SERPL-MCNC: >17.2 MG/DL — HIGH (ref 0–0.3)
BILIRUB DIRECT SERPL-MCNC: >17.2 MG/DL — HIGH (ref 0–0.3)
BILIRUB DIRECT SERPL-MCNC: >17.2 MG/DL — SIGNIFICANT CHANGE UP (ref 0–0.3)
BILIRUB INDIRECT FLD-MCNC: <5 MG/DL — HIGH (ref 0.2–1.2)
BILIRUB INDIRECT FLD-MCNC: <5.7 MG/DL — SIGNIFICANT CHANGE UP (ref 0.2–1.2)
BILIRUB INDIRECT FLD-MCNC: <6 MG/DL — HIGH (ref 0.2–1.2)
BILIRUB SERPL-MCNC: 22.2 MG/DL — CRITICAL HIGH (ref 0.2–1.2)
BILIRUB SERPL-MCNC: 22.9 MG/DL — CRITICAL HIGH (ref 0.2–1.2)
BILIRUB SERPL-MCNC: 23.2 MG/DL — CRITICAL HIGH (ref 0.2–1.2)
BILIRUB UR-MCNC: ABNORMAL
BLD GP AB SCN SERPL QL: SIGNIFICANT CHANGE UP
BUN SERPL-MCNC: 10 MG/DL — SIGNIFICANT CHANGE UP (ref 10–20)
BUN SERPL-MCNC: 11 MG/DL — SIGNIFICANT CHANGE UP (ref 10–20)
CALCIUM SERPL-MCNC: 7.7 MG/DL — LOW (ref 8.4–10.4)
CALCIUM SERPL-MCNC: 7.7 MG/DL — LOW (ref 8.4–10.5)
CALCIUM SERPL-MCNC: 7.9 MG/DL — LOW (ref 8.4–10.5)
CALCIUM SERPL-MCNC: 7.9 MG/DL — LOW (ref 8.4–10.5)
CHLORIDE SERPL-SCNC: 100 MMOL/L — SIGNIFICANT CHANGE UP (ref 98–110)
CHLORIDE SERPL-SCNC: 103 MMOL/L — SIGNIFICANT CHANGE UP (ref 98–110)
CHLORIDE SERPL-SCNC: 99 MMOL/L — SIGNIFICANT CHANGE UP (ref 98–110)
CHLORIDE SERPL-SCNC: 99 MMOL/L — SIGNIFICANT CHANGE UP (ref 98–110)
CHOLEST SERPL-MCNC: 129 MG/DL — SIGNIFICANT CHANGE UP
CO2 SERPL-SCNC: 24 MMOL/L — SIGNIFICANT CHANGE UP (ref 17–32)
CO2 SERPL-SCNC: 24 MMOL/L — SIGNIFICANT CHANGE UP (ref 17–32)
CO2 SERPL-SCNC: 26 MMOL/L — SIGNIFICANT CHANGE UP (ref 17–32)
CO2 SERPL-SCNC: 26 MMOL/L — SIGNIFICANT CHANGE UP (ref 17–32)
COCAINE METAB.OTHER UR-MCNC: NEGATIVE — SIGNIFICANT CHANGE UP
COLOR FLD: YELLOW — SIGNIFICANT CHANGE UP
COLOR SPEC: SIGNIFICANT CHANGE UP
CREAT ?TM UR-MCNC: 118 MG/DL — SIGNIFICANT CHANGE UP
CREAT SERPL-MCNC: 0.8 MG/DL — SIGNIFICANT CHANGE UP (ref 0.7–1.5)
CREAT SERPL-MCNC: 0.9 MG/DL — SIGNIFICANT CHANGE UP (ref 0.7–1.5)
CRP SERPL-MCNC: 51.1 MG/L — HIGH
DIFF PNL FLD: NEGATIVE — SIGNIFICANT CHANGE UP
DRUG SCREEN 1, URINE RESULT: SIGNIFICANT CHANGE UP
EGFR: 105 ML/MIN/1.73M2 — SIGNIFICANT CHANGE UP
EGFR: 108 ML/MIN/1.73M2 — SIGNIFICANT CHANGE UP
EOSINOPHIL # BLD AUTO: 0.29 K/UL — SIGNIFICANT CHANGE UP (ref 0–0.7)
EOSINOPHIL NFR BLD AUTO: 2.7 % — SIGNIFICANT CHANGE UP (ref 0–8)
ERYTHROCYTE [SEDIMENTATION RATE] IN BLOOD: 8 MM/HR — SIGNIFICANT CHANGE UP (ref 0–10)
ESTIMATED AVERAGE GLUCOSE: <77 MG/DL — SIGNIFICANT CHANGE UP (ref 68–114)
FENTANYL UR QL: NEGATIVE — SIGNIFICANT CHANGE UP
FERRITIN SERPL-MCNC: 1938 NG/ML — HIGH (ref 30–400)
FLUID INTAKE SUBSTANCE CLASS: SIGNIFICANT CHANGE UP
FOLATE SERPL-MCNC: 5.5 NG/ML — SIGNIFICANT CHANGE UP
GLUCOSE BLDC GLUCOMTR-MCNC: 104 MG/DL — HIGH (ref 70–99)
GLUCOSE BLDC GLUCOMTR-MCNC: 58 MG/DL — LOW (ref 70–99)
GLUCOSE BLDC GLUCOMTR-MCNC: 68 MG/DL — LOW (ref 70–99)
GLUCOSE SERPL-MCNC: 101 MG/DL — HIGH (ref 70–99)
GLUCOSE SERPL-MCNC: 52 MG/DL — CRITICAL LOW (ref 70–99)
GLUCOSE SERPL-MCNC: 71 MG/DL — SIGNIFICANT CHANGE UP (ref 70–99)
GLUCOSE SERPL-MCNC: 73 MG/DL — SIGNIFICANT CHANGE UP (ref 70–99)
GLUCOSE UR QL: NEGATIVE MG/DL — SIGNIFICANT CHANGE UP
GRAM STN FLD: SIGNIFICANT CHANGE UP
HCT VFR BLD CALC: 31.9 % — LOW (ref 42–52)
HDLC SERPL-MCNC: 11 MG/DL — LOW
HGB BLD-MCNC: 10.8 G/DL — LOW (ref 14–18)
IMM GRANULOCYTES NFR BLD AUTO: 1.7 % — HIGH (ref 0.1–0.3)
INR BLD: 1.44 RATIO — HIGH (ref 0.65–1.3)
IRON SATN MFR SERPL: 116 UG/DL — SIGNIFICANT CHANGE UP (ref 35–150)
IRON SATN MFR SERPL: SIGNIFICANT CHANGE UP % (ref 15–50)
KETONES UR-MCNC: NEGATIVE MG/DL — SIGNIFICANT CHANGE UP
LACTATE SERPL-SCNC: 1.9 MMOL/L — SIGNIFICANT CHANGE UP (ref 0.7–2)
LEUKOCYTE ESTERASE UR-ACNC: ABNORMAL
LIPID PNL WITH DIRECT LDL SERPL: 79 MG/DL — SIGNIFICANT CHANGE UP
LYMPHOCYTES # BLD AUTO: 1.18 K/UL — LOW (ref 1.2–3.4)
LYMPHOCYTES # BLD AUTO: 11.2 % — LOW (ref 20.5–51.1)
LYMPHOCYTES # FLD: 10 — SIGNIFICANT CHANGE UP
MAGNESIUM SERPL-MCNC: 1.7 MG/DL — LOW (ref 1.8–2.4)
MAGNESIUM SERPL-MCNC: 2.2 MG/DL — SIGNIFICANT CHANGE UP (ref 1.8–2.4)
MAGNESIUM SERPL-MCNC: 2.3 MG/DL — SIGNIFICANT CHANGE UP (ref 1.8–2.4)
MCHC RBC-ENTMCNC: 33.9 G/DL — SIGNIFICANT CHANGE UP (ref 32–37)
MCHC RBC-ENTMCNC: 40.3 PG — HIGH (ref 27–31)
MCV RBC AUTO: 119 FL — HIGH (ref 80–94)
MELD SCORE WITH DIALYSIS: 35 POINTS — SIGNIFICANT CHANGE UP
MELD SCORE WITHOUT DIALYSIS: 23 POINTS — SIGNIFICANT CHANGE UP
MESOTHL CELL # FLD: 4 % — SIGNIFICANT CHANGE UP
METHADONE UR-MCNC: POSITIVE
MONOCYTES # BLD AUTO: 0.67 K/UL — HIGH (ref 0.1–0.6)
MONOCYTES NFR BLD AUTO: 6.3 % — SIGNIFICANT CHANGE UP (ref 1.7–9.3)
MONOS+MACROS # FLD: 67 % — SIGNIFICANT CHANGE UP
NEUTROPHILS # BLD AUTO: 8.17 K/UL — HIGH (ref 1.4–6.5)
NEUTROPHILS NFR BLD AUTO: 77.2 % — HIGH (ref 42.2–75.2)
NEUTROPHILS-BODY FLUID: 19 % — SIGNIFICANT CHANGE UP
NITRITE UR-MCNC: POSITIVE
NON HDL CHOLESTEROL: 118 MG/DL — SIGNIFICANT CHANGE UP
NRBC # BLD: 0 /100 WBCS — SIGNIFICANT CHANGE UP (ref 0–0)
OPIATES UR-MCNC: NEGATIVE — SIGNIFICANT CHANGE UP
OSMOLALITY UR: 516 MOS/KG — SIGNIFICANT CHANGE UP (ref 50–1200)
OXYCODONE UR-MCNC: NEGATIVE — SIGNIFICANT CHANGE UP
PCP UR-MCNC: NEGATIVE — SIGNIFICANT CHANGE UP
PH UR: 7 — SIGNIFICANT CHANGE UP (ref 5–8)
PHOSPHATE SERPL-MCNC: 2.1 MG/DL — SIGNIFICANT CHANGE UP (ref 2.1–4.9)
PHOSPHATE SERPL-MCNC: 2.1 MG/DL — SIGNIFICANT CHANGE UP (ref 2.1–4.9)
PHOSPHATE SERPL-MCNC: 2.4 MG/DL — SIGNIFICANT CHANGE UP (ref 2.1–4.9)
PLATELET # BLD AUTO: 116 K/UL — LOW (ref 130–400)
PMV BLD: 12.3 FL — HIGH (ref 7.4–10.4)
POTASSIUM SERPL-MCNC: 2.6 MMOL/L — CRITICAL LOW (ref 3.5–5)
POTASSIUM SERPL-MCNC: 2.8 MMOL/L — LOW (ref 3.5–5)
POTASSIUM SERPL-MCNC: 3.5 MMOL/L — SIGNIFICANT CHANGE UP (ref 3.5–5)
POTASSIUM SERPL-MCNC: 3.7 MMOL/L — SIGNIFICANT CHANGE UP (ref 3.5–5)
POTASSIUM SERPL-SCNC: 2.6 MMOL/L — CRITICAL LOW (ref 3.5–5)
POTASSIUM SERPL-SCNC: 2.8 MMOL/L — LOW (ref 3.5–5)
POTASSIUM SERPL-SCNC: 3.5 MMOL/L — SIGNIFICANT CHANGE UP (ref 3.5–5)
POTASSIUM SERPL-SCNC: 3.7 MMOL/L — SIGNIFICANT CHANGE UP (ref 3.5–5)
POTASSIUM UR-SCNC: 73 MMOL/L — SIGNIFICANT CHANGE UP
PROPOXYPHENE QUALITATIVE URINE RESULT: NEGATIVE — SIGNIFICANT CHANGE UP
PROT ?TM UR-MCNC: 79 MG/DLG/24H — SIGNIFICANT CHANGE UP
PROT SERPL-MCNC: 5.1 G/DL — LOW (ref 6–8)
PROT SERPL-MCNC: 5.1 G/DL — LOW (ref 6–8)
PROT SERPL-MCNC: 5.2 G/DL — LOW (ref 6–8)
PROT UR-MCNC: 30 MG/DL
PROT/CREAT UR-RTO: 0.7 RATIO — HIGH (ref 0–0.2)
PROTHROM AB SERPL-ACNC: 16.5 SEC — HIGH (ref 9.95–12.87)
RBC # BLD: 2.68 M/UL — LOW (ref 4.7–6.1)
RBC # FLD: 16.6 % — HIGH (ref 11.5–14.5)
RCV VOL RI: 0 /UL — SIGNIFICANT CHANGE UP (ref 0–0)
SODIUM SERPL-SCNC: 135 MMOL/L — SIGNIFICANT CHANGE UP (ref 135–146)
SODIUM SERPL-SCNC: 136 MMOL/L — SIGNIFICANT CHANGE UP (ref 135–146)
SODIUM SERPL-SCNC: 136 MMOL/L — SIGNIFICANT CHANGE UP (ref 135–146)
SODIUM SERPL-SCNC: 137 MMOL/L — SIGNIFICANT CHANGE UP (ref 135–146)
SODIUM UR-SCNC: <20 MMOL/L — SIGNIFICANT CHANGE UP
SP GR SPEC: >1.03 — HIGH (ref 1–1.03)
SPECIMEN SOURCE: SIGNIFICANT CHANGE UP
T4 FREE+ TSH PNL SERPL: 8.17 UIU/ML — HIGH (ref 0.27–4.2)
THC UR QL: POSITIVE
TIBC SERPL-MCNC: <133 UG/DL — LOW (ref 220–430)
TOTAL NUCLEATED CELL COUNT, BODY FLUID: 52 /UL — SIGNIFICANT CHANGE UP
TRIGL SERPL-MCNC: 191 MG/DL — HIGH
TUBE TYPE: SIGNIFICANT CHANGE UP
UIBC SERPL-MCNC: <17 UG/DL — LOW (ref 110–370)
UROBILINOGEN FLD QL: 1 MG/DL — SIGNIFICANT CHANGE UP (ref 0.2–1)
UUN UR-MCNC: 465 MG/DL — SIGNIFICANT CHANGE UP
VIT B12 SERPL-MCNC: 1555 PG/ML — HIGH (ref 232–1245)
WBC # BLD: 10.58 K/UL — SIGNIFICANT CHANGE UP (ref 4.8–10.8)
WBC # FLD AUTO: 10.58 K/UL — SIGNIFICANT CHANGE UP (ref 4.8–10.8)

## 2024-06-20 PROCEDURE — 49082 ABD PARACENTESIS: CPT

## 2024-06-20 PROCEDURE — 76705 ECHO EXAM OF ABDOMEN: CPT | Mod: 26

## 2024-06-20 PROCEDURE — 99223 1ST HOSP IP/OBS HIGH 75: CPT

## 2024-06-20 PROCEDURE — 88112 CYTOPATH CELL ENHANCE TECH: CPT | Mod: 26

## 2024-06-20 PROCEDURE — 99233 SBSQ HOSP IP/OBS HIGH 50: CPT

## 2024-06-20 RX ORDER — POTASSIUM CHLORIDE 600 MG/1
20 TABLET, FILM COATED, EXTENDED RELEASE ORAL
Refills: 0 | Status: COMPLETED | OUTPATIENT
Start: 2024-06-20 | End: 2024-06-20

## 2024-06-20 RX ORDER — MAGNESIUM SULFATE 100 %
2 POWDER (GRAM) MISCELLANEOUS ONCE
Refills: 0 | Status: COMPLETED | OUTPATIENT
Start: 2024-06-20 | End: 2024-06-20

## 2024-06-20 RX ORDER — DEXTROSE 30 % IN WATER 30 %
50 VIAL (ML) INTRAVENOUS ONCE
Refills: 0 | Status: COMPLETED | OUTPATIENT
Start: 2024-06-20 | End: 2024-06-20

## 2024-06-20 RX ORDER — LORAZEPAM 0.5 MG
2 TABLET ORAL EVERY 4 HOURS
Refills: 0 | Status: DISCONTINUED | OUTPATIENT
Start: 2024-06-20 | End: 2024-06-24

## 2024-06-20 RX ORDER — POTASSIUM CHLORIDE 600 MG/1
40 TABLET, FILM COATED, EXTENDED RELEASE ORAL EVERY 4 HOURS
Refills: 0 | Status: COMPLETED | OUTPATIENT
Start: 2024-06-20 | End: 2024-06-20

## 2024-06-20 RX ORDER — LORAZEPAM 0.5 MG
1 TABLET ORAL EVERY 4 HOURS
Refills: 0 | Status: DISCONTINUED | OUTPATIENT
Start: 2024-06-20 | End: 2024-06-23

## 2024-06-20 RX ORDER — SPIRONOLACTONE 25 MG/1
50 TABLET ORAL DAILY
Refills: 0 | Status: DISCONTINUED | OUTPATIENT
Start: 2024-06-20 | End: 2024-06-21

## 2024-06-20 RX ADMIN — METRONIDAZOLE 500 MILLIGRAM(S): 500 TABLET ORAL at 05:05

## 2024-06-20 RX ADMIN — Medication 1 MILLIGRAM(S): at 22:08

## 2024-06-20 RX ADMIN — METRONIDAZOLE 500 MILLIGRAM(S): 500 TABLET ORAL at 21:07

## 2024-06-20 RX ADMIN — Medication 40 MILLIGRAM(S): at 23:51

## 2024-06-20 RX ADMIN — METRONIDAZOLE 500 MILLIGRAM(S): 500 TABLET ORAL at 14:27

## 2024-06-20 RX ADMIN — POTASSIUM CHLORIDE 40 MILLIEQUIVALENT(S): 600 TABLET, FILM COATED, EXTENDED RELEASE ORAL at 14:28

## 2024-06-20 RX ADMIN — Medication 300 MILLIGRAM(S): at 05:05

## 2024-06-20 RX ADMIN — Medication 50 MILLILITER(S): at 03:05

## 2024-06-20 RX ADMIN — Medication 1 MILLIGRAM(S): at 00:50

## 2024-06-20 RX ADMIN — PANTOPRAZOLE SODIUM 40 MILLIGRAM(S): 40 INJECTION, POWDER, FOR SOLUTION INTRAVENOUS at 05:05

## 2024-06-20 RX ADMIN — POTASSIUM CHLORIDE 50 MILLIEQUIVALENT(S): 600 TABLET, FILM COATED, EXTENDED RELEASE ORAL at 17:13

## 2024-06-20 RX ADMIN — Medication 1 MILLIGRAM(S): at 12:02

## 2024-06-20 RX ADMIN — Medication 40 MILLIGRAM(S): at 12:06

## 2024-06-20 RX ADMIN — Medication 100 MILLIGRAM(S): at 05:04

## 2024-06-20 RX ADMIN — POTASSIUM CHLORIDE 40 MILLIEQUIVALENT(S): 600 TABLET, FILM COATED, EXTENDED RELEASE ORAL at 12:03

## 2024-06-20 RX ADMIN — POTASSIUM CHLORIDE 50 MILLIEQUIVALENT(S): 600 TABLET, FILM COATED, EXTENDED RELEASE ORAL at 12:04

## 2024-06-20 RX ADMIN — POTASSIUM CHLORIDE 50 MILLIEQUIVALENT(S): 600 TABLET, FILM COATED, EXTENDED RELEASE ORAL at 14:28

## 2024-06-20 RX ADMIN — Medication 1 TABLET(S): at 12:02

## 2024-06-20 RX ADMIN — Medication 1 MILLIGRAM(S): at 12:11

## 2024-06-20 RX ADMIN — Medication 25 GRAM(S): at 03:06

## 2024-06-20 RX ADMIN — Medication 1000 MICROGRAM(S): at 12:03

## 2024-06-20 RX ADMIN — ENOXAPARIN SODIUM 40 MILLIGRAM(S): 100 INJECTION SUBCUTANEOUS at 05:05

## 2024-06-20 RX ADMIN — Medication 100 MILLIGRAM(S): at 12:02

## 2024-06-21 LAB
ALBUMIN FLD-MCNC: 0.5 G/DL — SIGNIFICANT CHANGE UP
ALBUMIN SERPL ELPH-MCNC: 2.1 G/DL — LOW (ref 3.5–5.2)
ALP SERPL-CCNC: 372 U/L — HIGH (ref 30–115)
ALT FLD-CCNC: 42 U/L — HIGH (ref 0–41)
ANION GAP SERPL CALC-SCNC: 9 MMOL/L — SIGNIFICANT CHANGE UP (ref 7–14)
APTT BLD: 40 SEC — HIGH (ref 27–39.2)
AST SERPL-CCNC: 165 U/L — HIGH (ref 0–41)
BASOPHILS # BLD AUTO: 0.09 K/UL — SIGNIFICANT CHANGE UP (ref 0–0.2)
BASOPHILS NFR BLD AUTO: 0.9 % — SIGNIFICANT CHANGE UP (ref 0–1)
BILIRUB SERPL-MCNC: 23.5 MG/DL — CRITICAL HIGH (ref 0.2–1.2)
BLD GP AB SCN SERPL QL: SIGNIFICANT CHANGE UP
BUN SERPL-MCNC: 11 MG/DL — SIGNIFICANT CHANGE UP (ref 10–20)
CALCIUM SERPL-MCNC: 7.8 MG/DL — LOW (ref 8.4–10.4)
CHLORIDE SERPL-SCNC: 102 MMOL/L — SIGNIFICANT CHANGE UP (ref 98–110)
CMV DNA CSF QL NAA+PROBE: SIGNIFICANT CHANGE UP IU/ML
CMV DNA SPEC NAA+PROBE-LOG#: SIGNIFICANT CHANGE UP LOG10IU/ML
CO2 SERPL-SCNC: 24 MMOL/L — SIGNIFICANT CHANGE UP (ref 17–32)
CREAT SERPL-MCNC: 1 MG/DL — SIGNIFICANT CHANGE UP (ref 0.7–1.5)
EBV DNA SERPL NAA+PROBE-ACNC: SIGNIFICANT CHANGE UP IU/ML
EBVPCR LOG: SIGNIFICANT CHANGE UP LOG10IU/ML
EGFR: 92 ML/MIN/1.73M2 — SIGNIFICANT CHANGE UP
EOSINOPHIL # BLD AUTO: 0.31 K/UL — SIGNIFICANT CHANGE UP (ref 0–0.7)
EOSINOPHIL NFR BLD AUTO: 3.2 % — SIGNIFICANT CHANGE UP (ref 0–8)
GLUCOSE FLD-MCNC: 87 MG/DL — SIGNIFICANT CHANGE UP
GLUCOSE SERPL-MCNC: 63 MG/DL — LOW (ref 70–99)
HAV IGM SER-ACNC: SIGNIFICANT CHANGE UP
HAV IGM SER-ACNC: SIGNIFICANT CHANGE UP
HBV CORE IGM SER-ACNC: SIGNIFICANT CHANGE UP
HBV CORE IGM SER-ACNC: SIGNIFICANT CHANGE UP
HBV SURFACE AG SER-ACNC: SIGNIFICANT CHANGE UP
HBV SURFACE AG SER-ACNC: SIGNIFICANT CHANGE UP
HCT VFR BLD CALC: 33.5 % — LOW (ref 42–52)
HCV AB S/CO SERPL IA: 0.11 S/CO — SIGNIFICANT CHANGE UP (ref 0–0.99)
HCV AB S/CO SERPL IA: 0.12 S/CO — SIGNIFICANT CHANGE UP (ref 0–0.99)
HCV AB SERPL-IMP: SIGNIFICANT CHANGE UP
HCV AB SERPL-IMP: SIGNIFICANT CHANGE UP
HGB BLD-MCNC: 11.4 G/DL — LOW (ref 14–18)
HIV 1+2 AB+HIV1 P24 AG SERPL QL IA: SIGNIFICANT CHANGE UP
IMM GRANULOCYTES NFR BLD AUTO: 1.8 % — HIGH (ref 0.1–0.3)
INR BLD: 1.52 RATIO — HIGH (ref 0.65–1.3)
LACTATE SERPL-SCNC: 1.1 MMOL/L — SIGNIFICANT CHANGE UP (ref 0.7–2)
LDH SERPL L TO P-CCNC: 79 U/L — SIGNIFICANT CHANGE UP
LYMPHOCYTES # BLD AUTO: 0.96 K/UL — LOW (ref 1.2–3.4)
LYMPHOCYTES # BLD AUTO: 10.1 % — LOW (ref 20.5–51.1)
MAGNESIUM SERPL-MCNC: 2.1 MG/DL — SIGNIFICANT CHANGE UP (ref 1.8–2.4)
MCHC RBC-ENTMCNC: 34 G/DL — SIGNIFICANT CHANGE UP (ref 32–37)
MCHC RBC-ENTMCNC: 40.1 PG — HIGH (ref 27–31)
MCV RBC AUTO: 118 FL — HIGH (ref 80–94)
MELD SCORE WITH DIALYSIS: 36 POINTS — SIGNIFICANT CHANGE UP
MELD SCORE WITHOUT DIALYSIS: 24 POINTS — SIGNIFICANT CHANGE UP
MONOCYTES # BLD AUTO: 0.59 K/UL — SIGNIFICANT CHANGE UP (ref 0.1–0.6)
MONOCYTES NFR BLD AUTO: 6.2 % — SIGNIFICANT CHANGE UP (ref 1.7–9.3)
NEUTROPHILS # BLD AUTO: 7.43 K/UL — HIGH (ref 1.4–6.5)
NEUTROPHILS NFR BLD AUTO: 77.8 % — HIGH (ref 42.2–75.2)
NRBC # BLD: 0 /100 WBCS — SIGNIFICANT CHANGE UP (ref 0–0)
PHOSPHATE SERPL-MCNC: 2 MG/DL — LOW (ref 2.1–4.9)
PLATELET # BLD AUTO: 126 K/UL — LOW (ref 130–400)
PMV BLD: 12.8 FL — HIGH (ref 7.4–10.4)
POTASSIUM SERPL-MCNC: 4.8 MMOL/L — SIGNIFICANT CHANGE UP (ref 3.5–5)
POTASSIUM SERPL-SCNC: 4.8 MMOL/L — SIGNIFICANT CHANGE UP (ref 3.5–5)
PROT FLD-MCNC: <1 G/DL — SIGNIFICANT CHANGE UP
PROT SERPL-MCNC: 5 G/DL — LOW (ref 6–8)
PROTHROM AB SERPL-ACNC: 17.4 SEC — HIGH (ref 9.95–12.87)
RBC # BLD: 2.84 M/UL — LOW (ref 4.7–6.1)
RBC # FLD: 16.8 % — HIGH (ref 11.5–14.5)
SODIUM SERPL-SCNC: 135 MMOL/L — SIGNIFICANT CHANGE UP (ref 135–146)
T4 FREE SERPL-MCNC: 1 NG/DL — SIGNIFICANT CHANGE UP (ref 0.9–1.8)
WBC # BLD: 9.55 K/UL — SIGNIFICANT CHANGE UP (ref 4.8–10.8)
WBC # FLD AUTO: 9.55 K/UL — SIGNIFICANT CHANGE UP (ref 4.8–10.8)

## 2024-06-21 PROCEDURE — 99233 SBSQ HOSP IP/OBS HIGH 50: CPT

## 2024-06-21 RX ORDER — PREDNISOLONE SODIUM PHOSPHATE
40 POWDER (GRAM) MISCELLANEOUS DAILY
Refills: 0 | Status: DISCONTINUED | OUTPATIENT
Start: 2024-06-21 | End: 2024-06-26

## 2024-06-21 RX ORDER — SOD PHOS DI, MONO/K PHOS MONO 250 MG
1 TABLET ORAL ONCE
Refills: 0 | Status: COMPLETED | OUTPATIENT
Start: 2024-06-21 | End: 2024-06-21

## 2024-06-21 RX ORDER — PREDNISONE 10 MG/1
40 TABLET ORAL DAILY
Refills: 0 | Status: DISCONTINUED | OUTPATIENT
Start: 2024-06-21 | End: 2024-06-21

## 2024-06-21 RX ORDER — FUROSEMIDE 10 MG/ML
40 INJECTION, SOLUTION INTRAMUSCULAR; INTRAVENOUS DAILY
Refills: 0 | Status: DISCONTINUED | OUTPATIENT
Start: 2024-06-21 | End: 2024-06-25

## 2024-06-21 RX ORDER — SPIRONOLACTONE 25 MG/1
50 TABLET ORAL ONCE
Refills: 0 | Status: COMPLETED | OUTPATIENT
Start: 2024-06-21 | End: 2024-06-21

## 2024-06-21 RX ORDER — SPIRONOLACTONE 25 MG/1
100 TABLET ORAL DAILY
Refills: 0 | Status: DISCONTINUED | OUTPATIENT
Start: 2024-06-22 | End: 2024-06-25

## 2024-06-21 RX ADMIN — SPIRONOLACTONE 50 MILLIGRAM(S): 25 TABLET ORAL at 17:33

## 2024-06-21 RX ADMIN — Medication 300 MILLIGRAM(S): at 05:10

## 2024-06-21 RX ADMIN — Medication 300 MILLIGRAM(S): at 17:33

## 2024-06-21 RX ADMIN — ENOXAPARIN SODIUM 40 MILLIGRAM(S): 100 INJECTION SUBCUTANEOUS at 05:10

## 2024-06-21 RX ADMIN — PANTOPRAZOLE SODIUM 40 MILLIGRAM(S): 40 INJECTION, POWDER, FOR SOLUTION INTRAVENOUS at 05:10

## 2024-06-21 RX ADMIN — FUROSEMIDE 40 MILLIGRAM(S): 10 INJECTION, SOLUTION INTRAMUSCULAR; INTRAVENOUS at 14:41

## 2024-06-21 RX ADMIN — Medication 1 MILLIGRAM(S): at 12:28

## 2024-06-21 RX ADMIN — METRONIDAZOLE 500 MILLIGRAM(S): 500 TABLET ORAL at 05:11

## 2024-06-21 RX ADMIN — Medication 100 MILLIGRAM(S): at 12:29

## 2024-06-21 RX ADMIN — Medication 1 TABLET(S): at 12:28

## 2024-06-21 RX ADMIN — Medication 1000 MICROGRAM(S): at 12:28

## 2024-06-21 RX ADMIN — METRONIDAZOLE 500 MILLIGRAM(S): 500 TABLET ORAL at 21:11

## 2024-06-21 RX ADMIN — Medication 100 MILLIGRAM(S): at 05:10

## 2024-06-21 RX ADMIN — Medication 1 PACKET(S): at 17:33

## 2024-06-21 RX ADMIN — METRONIDAZOLE 500 MILLIGRAM(S): 500 TABLET ORAL at 14:41

## 2024-06-21 RX ADMIN — SPIRONOLACTONE 50 MILLIGRAM(S): 25 TABLET ORAL at 05:11

## 2024-06-22 LAB
ALBUMIN SERPL ELPH-MCNC: 2.4 G/DL — LOW (ref 3.5–5.2)
ALP SERPL-CCNC: 413 U/L — HIGH (ref 30–115)
ALT FLD-CCNC: 43 U/L — HIGH (ref 0–41)
ANION GAP SERPL CALC-SCNC: 14 MMOL/L — SIGNIFICANT CHANGE UP (ref 7–14)
APTT BLD: 38.4 SEC — SIGNIFICANT CHANGE UP (ref 27–39.2)
AST SERPL-CCNC: 154 U/L — HIGH (ref 0–41)
BASOPHILS # BLD AUTO: 0.11 K/UL — SIGNIFICANT CHANGE UP (ref 0–0.2)
BASOPHILS NFR BLD AUTO: 0.9 % — SIGNIFICANT CHANGE UP (ref 0–1)
BILIRUB SERPL-MCNC: 26.3 MG/DL — CRITICAL HIGH (ref 0.2–1.2)
BUN SERPL-MCNC: 9 MG/DL — LOW (ref 10–20)
CALCIUM SERPL-MCNC: 8.3 MG/DL — LOW (ref 8.4–10.4)
CHLORIDE SERPL-SCNC: 101 MMOL/L — SIGNIFICANT CHANGE UP (ref 98–110)
CO2 SERPL-SCNC: 25 MMOL/L — SIGNIFICANT CHANGE UP (ref 17–32)
CREAT SERPL-MCNC: 1.3 MG/DL — SIGNIFICANT CHANGE UP (ref 0.7–1.5)
EGFR: 67 ML/MIN/1.73M2 — SIGNIFICANT CHANGE UP
EOSINOPHIL # BLD AUTO: 0.21 K/UL — SIGNIFICANT CHANGE UP (ref 0–0.7)
EOSINOPHIL NFR BLD AUTO: 1.7 % — SIGNIFICANT CHANGE UP (ref 0–8)
GLUCOSE SERPL-MCNC: 90 MG/DL — SIGNIFICANT CHANGE UP (ref 70–99)
HCT VFR BLD CALC: 35.4 % — LOW (ref 42–52)
HGB BLD-MCNC: 12.2 G/DL — LOW (ref 14–18)
IMM GRANULOCYTES NFR BLD AUTO: 2.9 % — HIGH (ref 0.1–0.3)
INR BLD: 1.54 RATIO — HIGH (ref 0.65–1.3)
LYMPHOCYTES # BLD AUTO: 1.24 K/UL — SIGNIFICANT CHANGE UP (ref 1.2–3.4)
LYMPHOCYTES # BLD AUTO: 10.2 % — LOW (ref 20.5–51.1)
MAGNESIUM SERPL-MCNC: 2 MG/DL — SIGNIFICANT CHANGE UP (ref 1.8–2.4)
MCHC RBC-ENTMCNC: 34.5 G/DL — SIGNIFICANT CHANGE UP (ref 32–37)
MCHC RBC-ENTMCNC: 40.1 PG — HIGH (ref 27–31)
MCV RBC AUTO: 116.4 FL — HIGH (ref 80–94)
MELD SCORE WITH DIALYSIS: 37 POINTS — SIGNIFICANT CHANGE UP
MELD SCORE WITH DIALYSIS: 37 POINTS — SIGNIFICANT CHANGE UP
MELD SCORE WITHOUT DIALYSIS: 26 POINTS — SIGNIFICANT CHANGE UP
MELD SCORE WITHOUT DIALYSIS: 26 POINTS — SIGNIFICANT CHANGE UP
MONOCYTES # BLD AUTO: 0.44 K/UL — SIGNIFICANT CHANGE UP (ref 0.1–0.6)
MONOCYTES NFR BLD AUTO: 3.6 % — SIGNIFICANT CHANGE UP (ref 1.7–9.3)
NEUTROPHILS # BLD AUTO: 9.85 K/UL — HIGH (ref 1.4–6.5)
NEUTROPHILS NFR BLD AUTO: 80.7 % — HIGH (ref 42.2–75.2)
NRBC # BLD: 0 /100 WBCS — SIGNIFICANT CHANGE UP (ref 0–0)
PHOSPHATE SERPL-MCNC: 2.6 MG/DL — SIGNIFICANT CHANGE UP (ref 2.1–4.9)
PLATELET # BLD AUTO: 159 K/UL — SIGNIFICANT CHANGE UP (ref 130–400)
PMV BLD: 12.6 FL — HIGH (ref 7.4–10.4)
POTASSIUM SERPL-MCNC: 3.7 MMOL/L — SIGNIFICANT CHANGE UP (ref 3.5–5)
POTASSIUM SERPL-SCNC: 3.7 MMOL/L — SIGNIFICANT CHANGE UP (ref 3.5–5)
PROT SERPL-MCNC: 5.7 G/DL — LOW (ref 6–8)
PROTHROM AB SERPL-ACNC: 17.6 SEC — HIGH (ref 9.95–12.87)
RBC # BLD: 3.04 M/UL — LOW (ref 4.7–6.1)
RBC # FLD: 16.7 % — HIGH (ref 11.5–14.5)
SODIUM SERPL-SCNC: 140 MMOL/L — SIGNIFICANT CHANGE UP (ref 135–146)
WBC # BLD: 12.2 K/UL — HIGH (ref 4.8–10.8)
WBC # FLD AUTO: 12.2 K/UL — HIGH (ref 4.8–10.8)

## 2024-06-22 PROCEDURE — 99233 SBSQ HOSP IP/OBS HIGH 50: CPT

## 2024-06-22 RX ORDER — METRONIDAZOLE 500 MG/1
500 TABLET ORAL EVERY 8 HOURS
Refills: 0 | Status: DISCONTINUED | OUTPATIENT
Start: 2024-06-22 | End: 2024-06-24

## 2024-06-22 RX ORDER — CEFTRIAXONE SODIUM 500 MG
2000 VIAL (EA) INJECTION EVERY 24 HOURS
Refills: 0 | Status: DISCONTINUED | OUTPATIENT
Start: 2024-06-23 | End: 2024-06-23

## 2024-06-22 RX ADMIN — Medication 100 MILLIGRAM(S): at 12:17

## 2024-06-22 RX ADMIN — Medication 300 MILLIGRAM(S): at 17:29

## 2024-06-22 RX ADMIN — Medication 50 MILLIGRAM(S): at 12:17

## 2024-06-22 RX ADMIN — Medication 40 MILLIGRAM(S): at 05:36

## 2024-06-22 RX ADMIN — Medication 100 MILLIGRAM(S): at 05:35

## 2024-06-22 RX ADMIN — Medication 1000 MICROGRAM(S): at 12:18

## 2024-06-22 RX ADMIN — Medication 1 TABLET(S): at 12:17

## 2024-06-22 RX ADMIN — METRONIDAZOLE 500 MILLIGRAM(S): 500 TABLET ORAL at 14:15

## 2024-06-22 RX ADMIN — Medication 1 MILLIGRAM(S): at 12:17

## 2024-06-22 RX ADMIN — Medication 1 MILLIGRAM(S): at 20:40

## 2024-06-22 RX ADMIN — ENOXAPARIN SODIUM 40 MILLIGRAM(S): 100 INJECTION SUBCUTANEOUS at 05:35

## 2024-06-22 RX ADMIN — FUROSEMIDE 40 MILLIGRAM(S): 10 INJECTION, SOLUTION INTRAMUSCULAR; INTRAVENOUS at 14:15

## 2024-06-22 RX ADMIN — METRONIDAZOLE 500 MILLIGRAM(S): 500 TABLET ORAL at 21:40

## 2024-06-22 RX ADMIN — Medication 300 MILLIGRAM(S): at 05:34

## 2024-06-22 RX ADMIN — PANTOPRAZOLE SODIUM 40 MILLIGRAM(S): 40 INJECTION, POWDER, FOR SOLUTION INTRAVENOUS at 05:35

## 2024-06-22 RX ADMIN — SPIRONOLACTONE 100 MILLIGRAM(S): 25 TABLET ORAL at 09:27

## 2024-06-22 RX ADMIN — METRONIDAZOLE 500 MILLIGRAM(S): 500 TABLET ORAL at 05:34

## 2024-06-23 LAB
ALBUMIN SERPL ELPH-MCNC: 2.4 G/DL — LOW (ref 3.5–5.2)
ALP SERPL-CCNC: 331 U/L — HIGH (ref 30–115)
ALT FLD-CCNC: 39 U/L — SIGNIFICANT CHANGE UP (ref 0–41)
ANION GAP SERPL CALC-SCNC: 13 MMOL/L — SIGNIFICANT CHANGE UP (ref 7–14)
APTT BLD: 41.3 SEC — HIGH (ref 27–39.2)
AST SERPL-CCNC: 122 U/L — HIGH (ref 0–41)
BASOPHILS # BLD AUTO: 0.13 K/UL — SIGNIFICANT CHANGE UP (ref 0–0.2)
BASOPHILS NFR BLD AUTO: 0.9 % — SIGNIFICANT CHANGE UP (ref 0–1)
BILIRUB SERPL-MCNC: 20.7 MG/DL — CRITICAL HIGH (ref 0.2–1.2)
BLD GP AB SCN SERPL QL: SIGNIFICANT CHANGE UP
BUN SERPL-MCNC: 9 MG/DL — LOW (ref 10–20)
CALCIUM SERPL-MCNC: 8.4 MG/DL — SIGNIFICANT CHANGE UP (ref 8.4–10.5)
CHLORIDE SERPL-SCNC: 98 MMOL/L — SIGNIFICANT CHANGE UP (ref 98–110)
CO2 SERPL-SCNC: 28 MMOL/L — SIGNIFICANT CHANGE UP (ref 17–32)
CREAT SERPL-MCNC: 1.3 MG/DL — SIGNIFICANT CHANGE UP (ref 0.7–1.5)
EGFR: 67 ML/MIN/1.73M2 — SIGNIFICANT CHANGE UP
EOSINOPHIL # BLD AUTO: 0.12 K/UL — SIGNIFICANT CHANGE UP (ref 0–0.7)
EOSINOPHIL NFR BLD AUTO: 0.8 % — SIGNIFICANT CHANGE UP (ref 0–8)
GLUCOSE SERPL-MCNC: 86 MG/DL — SIGNIFICANT CHANGE UP (ref 70–99)
HCT VFR BLD CALC: 36.4 % — LOW (ref 42–52)
HGB BLD-MCNC: 12.6 G/DL — LOW (ref 14–18)
IMM GRANULOCYTES NFR BLD AUTO: 2.4 % — HIGH (ref 0.1–0.3)
INR BLD: 1.66 RATIO — HIGH (ref 0.65–1.3)
LYMPHOCYTES # BLD AUTO: 1.32 K/UL — SIGNIFICANT CHANGE UP (ref 1.2–3.4)
LYMPHOCYTES # BLD AUTO: 8.7 % — LOW (ref 20.5–51.1)
MAGNESIUM SERPL-MCNC: 1.8 MG/DL — SIGNIFICANT CHANGE UP (ref 1.8–2.4)
MCHC RBC-ENTMCNC: 34.6 G/DL — SIGNIFICANT CHANGE UP (ref 32–37)
MCHC RBC-ENTMCNC: 40.8 PG — HIGH (ref 27–31)
MCV RBC AUTO: 117.8 FL — HIGH (ref 80–94)
MELD SCORE WITH DIALYSIS: 37 POINTS — SIGNIFICANT CHANGE UP
MELD SCORE WITHOUT DIALYSIS: 26 POINTS — SIGNIFICANT CHANGE UP
MONOCYTES # BLD AUTO: 0.78 K/UL — HIGH (ref 0.1–0.6)
MONOCYTES NFR BLD AUTO: 5.1 % — SIGNIFICANT CHANGE UP (ref 1.7–9.3)
NEUTROPHILS # BLD AUTO: 12.54 K/UL — HIGH (ref 1.4–6.5)
NEUTROPHILS NFR BLD AUTO: 82.1 % — HIGH (ref 42.2–75.2)
NRBC # BLD: 0 /100 WBCS — SIGNIFICANT CHANGE UP (ref 0–0)
PHOSPHATE SERPL-MCNC: 3.8 MG/DL — SIGNIFICANT CHANGE UP (ref 2.1–4.9)
PLATELET # BLD AUTO: 190 K/UL — SIGNIFICANT CHANGE UP (ref 130–400)
PMV BLD: 12.4 FL — HIGH (ref 7.4–10.4)
POTASSIUM SERPL-MCNC: 3.4 MMOL/L — LOW (ref 3.5–5)
POTASSIUM SERPL-SCNC: 3.4 MMOL/L — LOW (ref 3.5–5)
PROT SERPL-MCNC: 5.4 G/DL — LOW (ref 6–8)
PROTHROM AB SERPL-ACNC: 19 SEC — HIGH (ref 9.95–12.87)
RBC # BLD: 3.09 M/UL — LOW (ref 4.7–6.1)
RBC # FLD: 16.9 % — HIGH (ref 11.5–14.5)
SODIUM SERPL-SCNC: 139 MMOL/L — SIGNIFICANT CHANGE UP (ref 135–146)
WBC # BLD: 15.26 K/UL — HIGH (ref 4.8–10.8)
WBC # FLD AUTO: 15.26 K/UL — HIGH (ref 4.8–10.8)

## 2024-06-23 PROCEDURE — 99232 SBSQ HOSP IP/OBS MODERATE 35: CPT

## 2024-06-23 RX ORDER — CEFTRIAXONE SODIUM 500 MG
1000 VIAL (EA) INJECTION EVERY 24 HOURS
Refills: 0 | Status: DISCONTINUED | OUTPATIENT
Start: 2024-06-24 | End: 2024-06-24

## 2024-06-23 RX ORDER — POTASSIUM CHLORIDE 600 MG/1
40 TABLET, FILM COATED, EXTENDED RELEASE ORAL ONCE
Refills: 0 | Status: COMPLETED | OUTPATIENT
Start: 2024-06-23 | End: 2024-06-23

## 2024-06-23 RX ADMIN — METRONIDAZOLE 500 MILLIGRAM(S): 500 TABLET ORAL at 21:24

## 2024-06-23 RX ADMIN — METRONIDAZOLE 500 MILLIGRAM(S): 500 TABLET ORAL at 12:57

## 2024-06-23 RX ADMIN — Medication 300 MILLIGRAM(S): at 17:39

## 2024-06-23 RX ADMIN — METRONIDAZOLE 500 MILLIGRAM(S): 500 TABLET ORAL at 05:18

## 2024-06-23 RX ADMIN — Medication 1 MILLIGRAM(S): at 06:29

## 2024-06-23 RX ADMIN — Medication 1 MILLIGRAM(S): at 11:34

## 2024-06-23 RX ADMIN — POTASSIUM CHLORIDE 40 MILLIEQUIVALENT(S): 600 TABLET, FILM COATED, EXTENDED RELEASE ORAL at 16:36

## 2024-06-23 RX ADMIN — SPIRONOLACTONE 100 MILLIGRAM(S): 25 TABLET ORAL at 05:17

## 2024-06-23 RX ADMIN — Medication 300 MILLIGRAM(S): at 05:18

## 2024-06-23 RX ADMIN — Medication 1000 MICROGRAM(S): at 11:34

## 2024-06-23 RX ADMIN — PANTOPRAZOLE SODIUM 40 MILLIGRAM(S): 40 INJECTION, POWDER, FOR SOLUTION INTRAVENOUS at 05:18

## 2024-06-23 RX ADMIN — Medication 40 MILLIGRAM(S): at 05:17

## 2024-06-23 RX ADMIN — ENOXAPARIN SODIUM 40 MILLIGRAM(S): 100 INJECTION SUBCUTANEOUS at 05:17

## 2024-06-23 RX ADMIN — Medication 3 MILLIGRAM(S): at 21:24

## 2024-06-23 RX ADMIN — Medication 100 MILLIGRAM(S): at 05:16

## 2024-06-23 RX ADMIN — FUROSEMIDE 40 MILLIGRAM(S): 10 INJECTION, SOLUTION INTRAMUSCULAR; INTRAVENOUS at 05:17

## 2024-06-23 RX ADMIN — Medication 50 MILLIGRAM(S): at 11:35

## 2024-06-23 RX ADMIN — Medication 1 TABLET(S): at 11:34

## 2024-06-24 LAB
ALBUMIN SERPL ELPH-MCNC: 2.3 G/DL — LOW (ref 3.5–5.2)
ALP SERPL-CCNC: 319 U/L — HIGH (ref 30–115)
ALT FLD-CCNC: 36 U/L — SIGNIFICANT CHANGE UP (ref 0–41)
ANION GAP SERPL CALC-SCNC: 11 MMOL/L — SIGNIFICANT CHANGE UP (ref 7–14)
APTT BLD: 37.5 SEC — SIGNIFICANT CHANGE UP (ref 27–39.2)
AST SERPL-CCNC: 100 U/L — HIGH (ref 0–41)
BASOPHILS # BLD AUTO: 0.1 K/UL — SIGNIFICANT CHANGE UP (ref 0–0.2)
BASOPHILS NFR BLD AUTO: 0.8 % — SIGNIFICANT CHANGE UP (ref 0–1)
BILIRUB DIRECT SERPL-MCNC: 11.8 MG/DL — HIGH (ref 0–0.3)
BILIRUB INDIRECT FLD-MCNC: 5.3 MG/DL — HIGH (ref 0.2–1.2)
BILIRUB SERPL-MCNC: 17.1 MG/DL — CRITICAL HIGH (ref 0.2–1.2)
BUN SERPL-MCNC: 14 MG/DL — SIGNIFICANT CHANGE UP (ref 10–20)
CALCIUM SERPL-MCNC: 8.1 MG/DL — LOW (ref 8.4–10.5)
CHLORIDE SERPL-SCNC: 100 MMOL/L — SIGNIFICANT CHANGE UP (ref 98–110)
CO2 SERPL-SCNC: 29 MMOL/L — SIGNIFICANT CHANGE UP (ref 17–32)
CREAT SERPL-MCNC: 1.3 MG/DL — SIGNIFICANT CHANGE UP (ref 0.7–1.5)
CULTURE RESULTS: SIGNIFICANT CHANGE UP
CULTURE RESULTS: SIGNIFICANT CHANGE UP
EGFR: 67 ML/MIN/1.73M2 — SIGNIFICANT CHANGE UP
EOSINOPHIL # BLD AUTO: 0.16 K/UL — SIGNIFICANT CHANGE UP (ref 0–0.7)
EOSINOPHIL NFR BLD AUTO: 1.2 % — SIGNIFICANT CHANGE UP (ref 0–8)
GLUCOSE SERPL-MCNC: 81 MG/DL — SIGNIFICANT CHANGE UP (ref 70–99)
HCT VFR BLD CALC: 36.8 % — LOW (ref 42–52)
HGB BLD-MCNC: 12.7 G/DL — LOW (ref 14–18)
IMM GRANULOCYTES NFR BLD AUTO: 2.4 % — HIGH (ref 0.1–0.3)
INR BLD: 1.42 RATIO — HIGH (ref 0.65–1.3)
LYMPHOCYTES # BLD AUTO: 1.15 K/UL — LOW (ref 1.2–3.4)
LYMPHOCYTES # BLD AUTO: 8.9 % — LOW (ref 20.5–51.1)
MAGNESIUM SERPL-MCNC: 1.9 MG/DL — SIGNIFICANT CHANGE UP (ref 1.8–2.4)
MCHC RBC-ENTMCNC: 34.5 G/DL — SIGNIFICANT CHANGE UP (ref 32–37)
MCHC RBC-ENTMCNC: 40.1 PG — HIGH (ref 27–31)
MCV RBC AUTO: 116.1 FL — HIGH (ref 80–94)
MELD SCORE WITH DIALYSIS: 34 POINTS — SIGNIFICANT CHANGE UP
MELD SCORE WITHOUT DIALYSIS: 24 POINTS — SIGNIFICANT CHANGE UP
MONOCYTES # BLD AUTO: 0.65 K/UL — HIGH (ref 0.1–0.6)
MONOCYTES NFR BLD AUTO: 5 % — SIGNIFICANT CHANGE UP (ref 1.7–9.3)
NEUTROPHILS # BLD AUTO: 10.54 K/UL — HIGH (ref 1.4–6.5)
NEUTROPHILS NFR BLD AUTO: 81.7 % — HIGH (ref 42.2–75.2)
NRBC # BLD: 0 /100 WBCS — SIGNIFICANT CHANGE UP (ref 0–0)
PHOSPHATE SERPL-MCNC: 3.6 MG/DL — SIGNIFICANT CHANGE UP (ref 2.1–4.9)
PLATELET # BLD AUTO: 176 K/UL — SIGNIFICANT CHANGE UP (ref 130–400)
PMV BLD: 12.7 FL — HIGH (ref 7.4–10.4)
POTASSIUM SERPL-MCNC: 3.9 MMOL/L — SIGNIFICANT CHANGE UP (ref 3.5–5)
POTASSIUM SERPL-SCNC: 3.9 MMOL/L — SIGNIFICANT CHANGE UP (ref 3.5–5)
PROT SERPL-MCNC: 5.1 G/DL — LOW (ref 6–8)
PROTHROM AB SERPL-ACNC: 16.3 SEC — HIGH (ref 9.95–12.87)
RBC # BLD: 3.17 M/UL — LOW (ref 4.7–6.1)
RBC # FLD: 17.2 % — HIGH (ref 11.5–14.5)
SODIUM SERPL-SCNC: 140 MMOL/L — SIGNIFICANT CHANGE UP (ref 135–146)
SPECIMEN SOURCE: SIGNIFICANT CHANGE UP
SPECIMEN SOURCE: SIGNIFICANT CHANGE UP
WBC # BLD: 12.91 K/UL — HIGH (ref 4.8–10.8)
WBC # FLD AUTO: 12.91 K/UL — HIGH (ref 4.8–10.8)

## 2024-06-24 PROCEDURE — 99232 SBSQ HOSP IP/OBS MODERATE 35: CPT | Mod: GC

## 2024-06-24 PROCEDURE — 99232 SBSQ HOSP IP/OBS MODERATE 35: CPT

## 2024-06-24 RX ADMIN — SPIRONOLACTONE 100 MILLIGRAM(S): 25 TABLET ORAL at 05:17

## 2024-06-24 RX ADMIN — Medication 40 MILLIGRAM(S): at 05:16

## 2024-06-24 RX ADMIN — Medication 1 TABLET(S): at 12:37

## 2024-06-24 RX ADMIN — FUROSEMIDE 40 MILLIGRAM(S): 10 INJECTION, SOLUTION INTRAMUSCULAR; INTRAVENOUS at 05:17

## 2024-06-24 RX ADMIN — METRONIDAZOLE 500 MILLIGRAM(S): 500 TABLET ORAL at 05:17

## 2024-06-24 RX ADMIN — Medication 300 MILLIGRAM(S): at 17:34

## 2024-06-24 RX ADMIN — Medication 100 MILLIGRAM(S): at 05:16

## 2024-06-24 RX ADMIN — Medication 300 MILLIGRAM(S): at 05:18

## 2024-06-24 RX ADMIN — Medication 1000 MICROGRAM(S): at 12:37

## 2024-06-24 RX ADMIN — PANTOPRAZOLE SODIUM 40 MILLIGRAM(S): 40 INJECTION, POWDER, FOR SOLUTION INTRAVENOUS at 05:18

## 2024-06-24 RX ADMIN — METRONIDAZOLE 500 MILLIGRAM(S): 500 TABLET ORAL at 12:37

## 2024-06-24 RX ADMIN — Medication 50 MILLIGRAM(S): at 12:37

## 2024-06-24 RX ADMIN — Medication 1 MILLIGRAM(S): at 12:37

## 2024-06-24 RX ADMIN — ENOXAPARIN SODIUM 40 MILLIGRAM(S): 100 INJECTION SUBCUTANEOUS at 05:17

## 2024-06-25 ENCOUNTER — TRANSCRIPTION ENCOUNTER (OUTPATIENT)
Age: 49
End: 2024-06-25

## 2024-06-25 PROBLEM — K74.60 UNSPECIFIED CIRRHOSIS OF LIVER: Chronic | Status: ACTIVE | Noted: 2024-06-19

## 2024-06-25 PROBLEM — F11.10 OPIOID ABUSE, UNCOMPLICATED: Chronic | Status: ACTIVE | Noted: 2024-06-19

## 2024-06-25 PROBLEM — F11.21 OPIOID DEPENDENCE, IN REMISSION: Chronic | Status: ACTIVE | Noted: 2024-06-19

## 2024-06-25 LAB
ALBUMIN SERPL ELPH-MCNC: 2.8 G/DL — LOW (ref 3.5–5.2)
ALP SERPL-CCNC: 302 U/L — HIGH (ref 30–115)
ALT FLD-CCNC: 40 U/L — SIGNIFICANT CHANGE UP (ref 0–41)
ANION GAP SERPL CALC-SCNC: 9 MMOL/L — SIGNIFICANT CHANGE UP (ref 7–14)
APTT BLD: 37.6 SEC — SIGNIFICANT CHANGE UP (ref 27–39.2)
AST SERPL-CCNC: 90 U/L — HIGH (ref 0–41)
BASOPHILS # BLD AUTO: 0.13 K/UL — SIGNIFICANT CHANGE UP (ref 0–0.2)
BASOPHILS NFR BLD AUTO: 0.7 % — SIGNIFICANT CHANGE UP (ref 0–1)
BILIRUB SERPL-MCNC: 15.2 MG/DL — CRITICAL HIGH (ref 0.2–1.2)
BUN SERPL-MCNC: 15 MG/DL — SIGNIFICANT CHANGE UP (ref 10–20)
CALCIUM SERPL-MCNC: 8.3 MG/DL — LOW (ref 8.4–10.5)
CHLORIDE SERPL-SCNC: 95 MMOL/L — LOW (ref 98–110)
CO2 SERPL-SCNC: 30 MMOL/L — SIGNIFICANT CHANGE UP (ref 17–32)
CREAT ?TM UR-MCNC: 99 MG/DL — SIGNIFICANT CHANGE UP
CREAT SERPL-MCNC: 1.3 MG/DL — SIGNIFICANT CHANGE UP (ref 0.7–1.5)
CULTURE RESULTS: SIGNIFICANT CHANGE UP
EDDP UR QL CFM: SIGNIFICANT CHANGE UP NG/ML
EDDP, UR RESULT: SIGNIFICANT CHANGE UP NG/ML
EGFR: 67 ML/MIN/1.73M2 — SIGNIFICANT CHANGE UP
EOSINOPHIL # BLD AUTO: 0.19 K/UL — SIGNIFICANT CHANGE UP (ref 0–0.7)
EOSINOPHIL NFR BLD AUTO: 1 % — SIGNIFICANT CHANGE UP (ref 0–8)
GLUCOSE SERPL-MCNC: 110 MG/DL — HIGH (ref 70–99)
HCT VFR BLD CALC: 37.1 % — LOW (ref 42–52)
HGB BLD-MCNC: 12.6 G/DL — LOW (ref 14–18)
IMM GRANULOCYTES NFR BLD AUTO: 3.6 % — HIGH (ref 0.1–0.3)
INR BLD: 1.36 RATIO — HIGH (ref 0.65–1.3)
LYMPHOCYTES # BLD AUTO: 1.76 K/UL — SIGNIFICANT CHANGE UP (ref 1.2–3.4)
LYMPHOCYTES # BLD AUTO: 9.3 % — LOW (ref 20.5–51.1)
MAGNESIUM SERPL-MCNC: 1.9 MG/DL — SIGNIFICANT CHANGE UP (ref 1.8–2.4)
MCHC RBC-ENTMCNC: 34 G/DL — SIGNIFICANT CHANGE UP (ref 32–37)
MCHC RBC-ENTMCNC: 39.4 PG — HIGH (ref 27–31)
MCV RBC AUTO: 115.9 FL — HIGH (ref 80–94)
MELD SCORE WITH DIALYSIS: 34 POINTS — SIGNIFICANT CHANGE UP
MELD SCORE WITHOUT DIALYSIS: 25 POINTS — SIGNIFICANT CHANGE UP
METHADONE IN-HOUSE INTERPRETATION: POSITIVE
METHADONE UR CFM-MCNC: POSITIVE
MONOCYTES # BLD AUTO: 1.09 K/UL — HIGH (ref 0.1–0.6)
MONOCYTES NFR BLD AUTO: 5.7 % — SIGNIFICANT CHANGE UP (ref 1.7–9.3)
NEUTROPHILS # BLD AUTO: 15.13 K/UL — HIGH (ref 1.4–6.5)
NEUTROPHILS NFR BLD AUTO: 79.7 % — HIGH (ref 42.2–75.2)
NRBC # BLD: 0 /100 WBCS — SIGNIFICANT CHANGE UP (ref 0–0)
OSMOLALITY UR: 432 MOS/KG — SIGNIFICANT CHANGE UP (ref 50–1200)
PHOSPHATE SERPL-MCNC: 3.3 MG/DL — SIGNIFICANT CHANGE UP (ref 2.1–4.9)
PLATELET # BLD AUTO: 226 K/UL — SIGNIFICANT CHANGE UP (ref 130–400)
PMV BLD: 12.6 FL — HIGH (ref 7.4–10.4)
POTASSIUM SERPL-MCNC: 4.6 MMOL/L — SIGNIFICANT CHANGE UP (ref 3.5–5)
POTASSIUM SERPL-SCNC: 4.6 MMOL/L — SIGNIFICANT CHANGE UP (ref 3.5–5)
PROT SERPL-MCNC: 6.2 G/DL — SIGNIFICANT CHANGE UP (ref 6–8)
PROTHROM AB SERPL-ACNC: 15.6 SEC — HIGH (ref 9.95–12.87)
RBC # BLD: 3.2 M/UL — LOW (ref 4.7–6.1)
RBC # FLD: 17.1 % — HIGH (ref 11.5–14.5)
SODIUM SERPL-SCNC: 134 MMOL/L — LOW (ref 135–146)
SODIUM UR-SCNC: 48 MMOL/L — SIGNIFICANT CHANGE UP
SPECIMEN SOURCE: SIGNIFICANT CHANGE UP
WBC # BLD: 18.98 K/UL — HIGH (ref 4.8–10.8)
WBC # FLD AUTO: 18.98 K/UL — HIGH (ref 4.8–10.8)

## 2024-06-25 PROCEDURE — 99232 SBSQ HOSP IP/OBS MODERATE 35: CPT

## 2024-06-25 PROCEDURE — 99233 SBSQ HOSP IP/OBS HIGH 50: CPT

## 2024-06-25 RX ORDER — PREDNISOLONE SODIUM PHOSPHATE
13.33 POWDER (GRAM) MISCELLANEOUS
Qty: 0 | Refills: 0 | DISCHARGE
Start: 2024-06-25

## 2024-06-25 RX ORDER — SPIRONOLACTONE 25 MG/1
4 TABLET ORAL
Qty: 0 | Refills: 0 | DISCHARGE
Start: 2024-06-25

## 2024-06-25 RX ORDER — ALBUMIN HUMAN 5 %
350 INTRAVENOUS SOLUTION INTRAVENOUS ONCE
Refills: 0 | Status: COMPLETED | OUTPATIENT
Start: 2024-06-25 | End: 2024-06-25

## 2024-06-25 RX ORDER — LACTULOSE 10 G/15ML
20 SOLUTION ORAL THREE TIMES A DAY
Refills: 0 | Status: DISCONTINUED | OUTPATIENT
Start: 2024-06-25 | End: 2024-06-26

## 2024-06-25 RX ORDER — CIPROFLOXACIN HCL 500 MG
500 TABLET ORAL DAILY
Refills: 0 | Status: DISCONTINUED | OUTPATIENT
Start: 2024-06-25 | End: 2024-06-26

## 2024-06-25 RX ORDER — ALBUMIN HUMAN 5 %
86 INTRAVENOUS SOLUTION INTRAVENOUS ONCE
Refills: 0 | Status: DISCONTINUED | OUTPATIENT
Start: 2024-06-25 | End: 2024-06-25

## 2024-06-25 RX ORDER — ALBUMIN HUMAN 5 %
350 INTRAVENOUS SOLUTION INTRAVENOUS ONCE
Refills: 0 | Status: DISCONTINUED | OUTPATIENT
Start: 2024-06-25 | End: 2024-06-25

## 2024-06-25 RX ORDER — FUROSEMIDE 10 MG/ML
1 INJECTION, SOLUTION INTRAMUSCULAR; INTRAVENOUS
Qty: 0 | Refills: 0 | DISCHARGE
Start: 2024-06-25

## 2024-06-25 RX ADMIN — Medication 175 MILLILITER(S): at 22:04

## 2024-06-25 RX ADMIN — Medication 1 TABLET(S): at 11:21

## 2024-06-25 RX ADMIN — FUROSEMIDE 40 MILLIGRAM(S): 10 INJECTION, SOLUTION INTRAMUSCULAR; INTRAVENOUS at 06:01

## 2024-06-25 RX ADMIN — Medication 500 MILLIGRAM(S): at 18:56

## 2024-06-25 RX ADMIN — Medication 40 MILLIGRAM(S): at 08:27

## 2024-06-25 RX ADMIN — Medication 50 MILLIGRAM(S): at 11:20

## 2024-06-25 RX ADMIN — PANTOPRAZOLE SODIUM 40 MILLIGRAM(S): 40 INJECTION, POWDER, FOR SOLUTION INTRAVENOUS at 06:01

## 2024-06-25 RX ADMIN — ENOXAPARIN SODIUM 40 MILLIGRAM(S): 100 INJECTION SUBCUTANEOUS at 06:01

## 2024-06-25 RX ADMIN — LACTULOSE 20 GRAM(S): 10 SOLUTION ORAL at 21:39

## 2024-06-25 RX ADMIN — SPIRONOLACTONE 100 MILLIGRAM(S): 25 TABLET ORAL at 06:01

## 2024-06-25 RX ADMIN — Medication 300 MILLIGRAM(S): at 17:13

## 2024-06-25 RX ADMIN — Medication 300 MILLIGRAM(S): at 06:01

## 2024-06-26 ENCOUNTER — TRANSCRIPTION ENCOUNTER (OUTPATIENT)
Age: 49
End: 2024-06-26

## 2024-06-26 VITALS
OXYGEN SATURATION: 99 % | SYSTOLIC BLOOD PRESSURE: 120 MMHG | HEART RATE: 68 BPM | DIASTOLIC BLOOD PRESSURE: 78 MMHG | TEMPERATURE: 98 F

## 2024-06-26 LAB
ALBUMIN SERPL ELPH-MCNC: 3.6 G/DL — SIGNIFICANT CHANGE UP (ref 3.5–5.2)
ALP SERPL-CCNC: 252 U/L — HIGH (ref 30–115)
ALT FLD-CCNC: 32 U/L — SIGNIFICANT CHANGE UP (ref 0–41)
AMMONIA BLD-MCNC: 25 UMOL/L — SIGNIFICANT CHANGE UP (ref 11–55)
ANION GAP SERPL CALC-SCNC: 11 MMOL/L — SIGNIFICANT CHANGE UP (ref 7–14)
APTT BLD: 36 SEC — SIGNIFICANT CHANGE UP (ref 27–39.2)
AST SERPL-CCNC: 75 U/L — HIGH (ref 0–41)
BASOPHILS # BLD AUTO: 0.04 K/UL — SIGNIFICANT CHANGE UP (ref 0–0.2)
BASOPHILS NFR BLD AUTO: 0.4 % — SIGNIFICANT CHANGE UP (ref 0–1)
BILIRUB SERPL-MCNC: 12.9 MG/DL — HIGH (ref 0.2–1.2)
BUN SERPL-MCNC: 15 MG/DL — SIGNIFICANT CHANGE UP (ref 10–20)
CALCIUM SERPL-MCNC: 8.4 MG/DL — SIGNIFICANT CHANGE UP (ref 8.4–10.4)
CHLORIDE SERPL-SCNC: 93 MMOL/L — LOW (ref 98–110)
CO2 SERPL-SCNC: 29 MMOL/L — SIGNIFICANT CHANGE UP (ref 17–32)
CREAT SERPL-MCNC: 1 MG/DL — SIGNIFICANT CHANGE UP (ref 0.7–1.5)
EGFR: 92 ML/MIN/1.73M2 — SIGNIFICANT CHANGE UP
EOSINOPHIL # BLD AUTO: 0.04 K/UL — SIGNIFICANT CHANGE UP (ref 0–0.7)
EOSINOPHIL NFR BLD AUTO: 0.4 % — SIGNIFICANT CHANGE UP (ref 0–8)
GLUCOSE SERPL-MCNC: 101 MG/DL — HIGH (ref 70–99)
HCT VFR BLD CALC: 32.5 % — LOW (ref 42–52)
HGB BLD-MCNC: 11.1 G/DL — LOW (ref 14–18)
IMM GRANULOCYTES NFR BLD AUTO: 2.2 % — HIGH (ref 0.1–0.3)
INR BLD: 1.41 RATIO — HIGH (ref 0.65–1.3)
LYMPHOCYTES # BLD AUTO: 1.12 K/UL — LOW (ref 1.2–3.4)
LYMPHOCYTES # BLD AUTO: 9.8 % — LOW (ref 20.5–51.1)
MAGNESIUM SERPL-MCNC: 2.1 MG/DL — SIGNIFICANT CHANGE UP (ref 1.8–2.4)
MCHC RBC-ENTMCNC: 34.2 G/DL — SIGNIFICANT CHANGE UP (ref 32–37)
MCHC RBC-ENTMCNC: 39.5 PG — HIGH (ref 27–31)
MCV RBC AUTO: 115.7 FL — HIGH (ref 80–94)
MELD SCORE WITH DIALYSIS: 34 POINTS — SIGNIFICANT CHANGE UP
MELD SCORE WITHOUT DIALYSIS: 23 POINTS — SIGNIFICANT CHANGE UP
MONOCYTES # BLD AUTO: 0.54 K/UL — SIGNIFICANT CHANGE UP (ref 0.1–0.6)
MONOCYTES NFR BLD AUTO: 4.7 % — SIGNIFICANT CHANGE UP (ref 1.7–9.3)
NEUTROPHILS # BLD AUTO: 9.4 K/UL — HIGH (ref 1.4–6.5)
NEUTROPHILS NFR BLD AUTO: 82.5 % — HIGH (ref 42.2–75.2)
NRBC # BLD: 0 /100 WBCS — SIGNIFICANT CHANGE UP (ref 0–0)
PHOSPHATE SERPL-MCNC: 2.6 MG/DL — SIGNIFICANT CHANGE UP (ref 2.1–4.9)
PLATELET # BLD AUTO: 170 K/UL — SIGNIFICANT CHANGE UP (ref 130–400)
PMV BLD: 12.4 FL — HIGH (ref 7.4–10.4)
POTASSIUM SERPL-MCNC: 3.8 MMOL/L — SIGNIFICANT CHANGE UP (ref 3.5–5)
POTASSIUM SERPL-SCNC: 3.8 MMOL/L — SIGNIFICANT CHANGE UP (ref 3.5–5)
PROT ?TM UR-MCNC: 28 MG/DLG/24H — SIGNIFICANT CHANGE UP
PROT SERPL-MCNC: 6.3 G/DL — SIGNIFICANT CHANGE UP (ref 6–8)
PROTHROM AB SERPL-ACNC: 16.1 SEC — HIGH (ref 9.95–12.87)
RBC # BLD: 2.81 M/UL — LOW (ref 4.7–6.1)
RBC # FLD: 16.8 % — HIGH (ref 11.5–14.5)
SODIUM SERPL-SCNC: 133 MMOL/L — LOW (ref 135–146)
WBC # BLD: 11.39 K/UL — HIGH (ref 4.8–10.8)
WBC # FLD AUTO: 11.39 K/UL — HIGH (ref 4.8–10.8)

## 2024-06-26 PROCEDURE — 99232 SBSQ HOSP IP/OBS MODERATE 35: CPT

## 2024-06-26 PROCEDURE — 99239 HOSP IP/OBS DSCHRG MGMT >30: CPT

## 2024-06-26 RX ORDER — CIPROFLOXACIN HCL 500 MG
1 TABLET ORAL
Qty: 30 | Refills: 0
Start: 2024-06-26 | End: 2024-07-25

## 2024-06-26 RX ORDER — PREDNISONE 10 MG/1
2 TABLET ORAL
Qty: 46 | Refills: 0
Start: 2024-06-26 | End: 2024-07-18

## 2024-06-26 RX ORDER — THIAMINE HCL 100 MG
1 TABLET ORAL
Refills: 0 | DISCHARGE

## 2024-06-26 RX ORDER — SPIRONOLACTONE 25 MG/1
4 TABLET ORAL
Qty: 120 | Refills: 0
Start: 2024-06-26 | End: 2024-07-25

## 2024-06-26 RX ORDER — LACTULOSE 10 G/15ML
15 SOLUTION ORAL
Qty: 1350 | Refills: 0
Start: 2024-06-26 | End: 2024-07-25

## 2024-06-26 RX ORDER — SPIRONOLACTONE 25 MG/1
100 TABLET ORAL DAILY
Refills: 0 | Status: DISCONTINUED | OUTPATIENT
Start: 2024-06-27 | End: 2024-06-26

## 2024-06-26 RX ORDER — LACTULOSE 10 G/15ML
10 SOLUTION ORAL THREE TIMES A DAY
Refills: 0 | Status: DISCONTINUED | OUTPATIENT
Start: 2024-06-26 | End: 2024-06-26

## 2024-06-26 RX ORDER — FOLIC ACID
1 POWDER (GRAM) MISCELLANEOUS
Refills: 0 | DISCHARGE

## 2024-06-26 RX ORDER — FUROSEMIDE 10 MG/ML
1 INJECTION, SOLUTION INTRAMUSCULAR; INTRAVENOUS
Qty: 30 | Refills: 0
Start: 2024-06-26 | End: 2024-07-25

## 2024-06-26 RX ORDER — FUROSEMIDE 10 MG/ML
40 INJECTION, SOLUTION INTRAMUSCULAR; INTRAVENOUS DAILY
Refills: 0 | Status: DISCONTINUED | OUTPATIENT
Start: 2024-06-27 | End: 2024-06-26

## 2024-06-26 RX ORDER — CYANOCOBALAMIN (VITAMIN B-12) 1000 MCG
1 TABLET, EXTENDED RELEASE ORAL
Refills: 0 | DISCHARGE

## 2024-06-26 RX ADMIN — ENOXAPARIN SODIUM 40 MILLIGRAM(S): 100 INJECTION SUBCUTANEOUS at 05:33

## 2024-06-26 RX ADMIN — LACTULOSE 20 GRAM(S): 10 SOLUTION ORAL at 05:33

## 2024-06-26 RX ADMIN — Medication 40 MILLIGRAM(S): at 05:58

## 2024-06-26 RX ADMIN — Medication 500 MILLIGRAM(S): at 11:11

## 2024-06-26 RX ADMIN — Medication 1 TABLET(S): at 11:11

## 2024-06-26 RX ADMIN — LACTULOSE 10 GRAM(S): 10 SOLUTION ORAL at 13:48

## 2024-06-26 RX ADMIN — Medication 1 APPLICATION(S): at 11:13

## 2024-06-26 RX ADMIN — PANTOPRAZOLE SODIUM 40 MILLIGRAM(S): 40 INJECTION, POWDER, FOR SOLUTION INTRAVENOUS at 05:33

## 2024-06-26 RX ADMIN — Medication 50 MILLIGRAM(S): at 11:11

## 2024-06-26 RX ADMIN — Medication 300 MILLIGRAM(S): at 05:33

## 2024-06-28 LAB
CARBOXYTHC UR CFM-MCNC: 331 NG/ML — HIGH
TOXICOLOGIST REVIEW: POSITIVE — SIGNIFICANT CHANGE UP

## 2024-07-03 ENCOUNTER — APPOINTMENT (OUTPATIENT)
Dept: PSYCHIATRY | Facility: CLINIC | Age: 49
End: 2024-07-03

## 2024-07-08 DIAGNOSIS — K76.6 PORTAL HYPERTENSION: ICD-10-CM

## 2024-07-08 DIAGNOSIS — E83.42 HYPOMAGNESEMIA: ICD-10-CM

## 2024-07-08 DIAGNOSIS — E02 SUBCLINICAL IODINE-DEFICIENCY HYPOTHYROIDISM: ICD-10-CM

## 2024-07-08 DIAGNOSIS — G89.29 OTHER CHRONIC PAIN: ICD-10-CM

## 2024-07-08 DIAGNOSIS — D68.4 ACQUIRED COAGULATION FACTOR DEFICIENCY: ICD-10-CM

## 2024-07-08 DIAGNOSIS — M54.9 DORSALGIA, UNSPECIFIED: ICD-10-CM

## 2024-07-08 DIAGNOSIS — E51.9 THIAMINE DEFICIENCY, UNSPECIFIED: ICD-10-CM

## 2024-07-08 DIAGNOSIS — K70.31 ALCOHOLIC CIRRHOSIS OF LIVER WITH ASCITES: ICD-10-CM

## 2024-07-08 DIAGNOSIS — E87.6 HYPOKALEMIA: ICD-10-CM

## 2024-07-08 DIAGNOSIS — E87.1 HYPO-OSMOLALITY AND HYPONATREMIA: ICD-10-CM

## 2024-07-08 DIAGNOSIS — L03.115 CELLULITIS OF RIGHT LOWER LIMB: ICD-10-CM

## 2024-07-08 DIAGNOSIS — F17.210 NICOTINE DEPENDENCE, CIGARETTES, UNCOMPLICATED: ICD-10-CM

## 2024-07-08 DIAGNOSIS — F10.10 ALCOHOL ABUSE, UNCOMPLICATED: ICD-10-CM

## 2024-07-08 DIAGNOSIS — D53.9 NUTRITIONAL ANEMIA, UNSPECIFIED: ICD-10-CM

## 2024-07-08 DIAGNOSIS — E86.0 DEHYDRATION: ICD-10-CM

## 2024-07-08 DIAGNOSIS — L97.919 NON-PRESSURE CHRONIC ULCER OF UNSPECIFIED PART OF RIGHT LOWER LEG WITH UNSPECIFIED SEVERITY: ICD-10-CM

## 2024-07-08 DIAGNOSIS — N17.9 ACUTE KIDNEY FAILURE, UNSPECIFIED: ICD-10-CM

## 2024-07-08 DIAGNOSIS — K70.0 ALCOHOLIC FATTY LIVER: ICD-10-CM

## 2024-07-08 DIAGNOSIS — K70.40 ALCOHOLIC HEPATIC FAILURE WITHOUT COMA: ICD-10-CM

## 2024-07-08 DIAGNOSIS — D84.89 OTHER IMMUNODEFICIENCIES: ICD-10-CM

## 2024-07-08 DIAGNOSIS — F11.20 OPIOID DEPENDENCE, UNCOMPLICATED: ICD-10-CM

## 2024-07-08 DIAGNOSIS — K76.82 HEPATIC ENCEPHALOPATHY: ICD-10-CM

## 2024-07-08 DIAGNOSIS — K70.11 ALCOHOLIC HEPATITIS WITH ASCITES: ICD-10-CM

## 2024-07-20 LAB
CULTURE RESULTS: SIGNIFICANT CHANGE UP
SPECIMEN SOURCE: SIGNIFICANT CHANGE UP

## 2024-11-20 ENCOUNTER — INPATIENT (INPATIENT)
Facility: HOSPITAL | Age: 49
LOS: 7 days | Discharge: HOME | DRG: 861 | End: 2024-11-28
Attending: INTERNAL MEDICINE | Admitting: STUDENT IN AN ORGANIZED HEALTH CARE EDUCATION/TRAINING PROGRAM
Payer: MEDICAID

## 2024-11-20 VITALS
DIASTOLIC BLOOD PRESSURE: 89 MMHG | WEIGHT: 175.93 LBS | SYSTOLIC BLOOD PRESSURE: 138 MMHG | RESPIRATION RATE: 18 BRPM | HEART RATE: 99 BPM | OXYGEN SATURATION: 98 % | TEMPERATURE: 98 F

## 2024-11-20 DIAGNOSIS — R77.8 OTHER SPECIFIED ABNORMALITIES OF PLASMA PROTEINS: ICD-10-CM

## 2024-11-20 LAB
ALBUMIN SERPL ELPH-MCNC: 3.2 G/DL — LOW (ref 3.5–5.2)
ALP SERPL-CCNC: 565 U/L — HIGH (ref 30–115)
ALT FLD-CCNC: 135 U/L — HIGH (ref 0–41)
ANION GAP SERPL CALC-SCNC: 14 MMOL/L — SIGNIFICANT CHANGE UP (ref 7–14)
APTT BLD: 33.6 SEC — SIGNIFICANT CHANGE UP (ref 27–39.2)
AST SERPL-CCNC: 505 U/L — HIGH (ref 0–41)
BASOPHILS # BLD AUTO: 0.41 K/UL — HIGH (ref 0–0.2)
BASOPHILS NFR BLD AUTO: 3.5 % — HIGH (ref 0–1)
BILIRUB DIRECT SERPL-MCNC: 16.7 MG/DL — HIGH (ref 0–0.3)
BILIRUB INDIRECT FLD-MCNC: 6.2 MG/DL — HIGH (ref 0.2–1.2)
BILIRUB SERPL-MCNC: 22.9 MG/DL — CRITICAL HIGH (ref 0.2–1.2)
BUN SERPL-MCNC: 9 MG/DL — LOW (ref 10–20)
CALCIUM SERPL-MCNC: 8.2 MG/DL — LOW (ref 8.4–10.5)
CHLORIDE SERPL-SCNC: 99 MMOL/L — SIGNIFICANT CHANGE UP (ref 98–110)
CO2 SERPL-SCNC: 25 MMOL/L — SIGNIFICANT CHANGE UP (ref 17–32)
CREAT SERPL-MCNC: 0.7 MG/DL — SIGNIFICANT CHANGE UP (ref 0.7–1.5)
EGFR: 113 ML/MIN/1.73M2 — SIGNIFICANT CHANGE UP
EOSINOPHIL # BLD AUTO: 0 K/UL — SIGNIFICANT CHANGE UP (ref 0–0.7)
EOSINOPHIL NFR BLD AUTO: 0 % — SIGNIFICANT CHANGE UP (ref 0–8)
GLUCOSE SERPL-MCNC: 84 MG/DL — SIGNIFICANT CHANGE UP (ref 70–99)
HCT VFR BLD CALC: 30.7 % — LOW (ref 42–52)
HGB BLD-MCNC: 10.9 G/DL — LOW (ref 14–18)
INR BLD: 1.33 RATIO — HIGH (ref 0.65–1.3)
LIDOCAIN IGE QN: 107 U/L — HIGH (ref 7–60)
LYMPHOCYTES # BLD AUTO: 0.11 K/UL — LOW (ref 1.2–3.4)
LYMPHOCYTES # BLD AUTO: 0.9 % — LOW (ref 20.5–51.1)
MCHC RBC-ENTMCNC: 35.5 G/DL — SIGNIFICANT CHANGE UP (ref 32–37)
MCHC RBC-ENTMCNC: 39.9 PG — HIGH (ref 27–31)
MCV RBC AUTO: 112.5 FL — HIGH (ref 80–94)
MONOCYTES # BLD AUTO: 0.51 K/UL — SIGNIFICANT CHANGE UP (ref 0.1–0.6)
MONOCYTES NFR BLD AUTO: 4.3 % — SIGNIFICANT CHANGE UP (ref 1.7–9.3)
NEUTROPHILS # BLD AUTO: 10.75 K/UL — HIGH (ref 1.4–6.5)
NEUTROPHILS NFR BLD AUTO: 91.3 % — HIGH (ref 42.2–75.2)
NRBC # BLD: SIGNIFICANT CHANGE UP /100 WBCS (ref 0–0)
PLATELET # BLD AUTO: 129 K/UL — LOW (ref 130–400)
PMV BLD: 10.2 FL — SIGNIFICANT CHANGE UP (ref 7.4–10.4)
POTASSIUM SERPL-MCNC: 3.5 MMOL/L — SIGNIFICANT CHANGE UP (ref 3.5–5)
POTASSIUM SERPL-SCNC: 3.5 MMOL/L — SIGNIFICANT CHANGE UP (ref 3.5–5)
PROT SERPL-MCNC: 6.2 G/DL — SIGNIFICANT CHANGE UP (ref 6–8)
PROTHROM AB SERPL-ACNC: 15.8 SEC — HIGH (ref 9.95–12.87)
RBC # BLD: 2.73 M/UL — LOW (ref 4.7–6.1)
RBC # FLD: 18.6 % — HIGH (ref 11.5–14.5)
SODIUM SERPL-SCNC: 138 MMOL/L — SIGNIFICANT CHANGE UP (ref 135–146)
WBC # BLD: 11.77 K/UL — HIGH (ref 4.8–10.8)
WBC # FLD AUTO: 11.77 K/UL — HIGH (ref 4.8–10.8)

## 2024-11-20 PROCEDURE — 80048 BASIC METABOLIC PNL TOTAL CA: CPT

## 2024-11-20 PROCEDURE — 82042 OTHER SOURCE ALBUMIN QUAN EA: CPT

## 2024-11-20 PROCEDURE — 85027 COMPLETE CBC AUTOMATED: CPT

## 2024-11-20 PROCEDURE — 85652 RBC SED RATE AUTOMATED: CPT

## 2024-11-20 PROCEDURE — 89051 BODY FLUID CELL COUNT: CPT

## 2024-11-20 PROCEDURE — 87102 FUNGUS ISOLATION CULTURE: CPT

## 2024-11-20 PROCEDURE — C1729: CPT

## 2024-11-20 PROCEDURE — 86850 RBC ANTIBODY SCREEN: CPT

## 2024-11-20 PROCEDURE — 87205 SMEAR GRAM STAIN: CPT

## 2024-11-20 PROCEDURE — 86705 HEP B CORE ANTIBODY IGM: CPT

## 2024-11-20 PROCEDURE — 80076 HEPATIC FUNCTION PANEL: CPT

## 2024-11-20 PROCEDURE — 86140 C-REACTIVE PROTEIN: CPT

## 2024-11-20 PROCEDURE — 80053 COMPREHEN METABOLIC PANEL: CPT

## 2024-11-20 PROCEDURE — 85025 COMPLETE CBC W/AUTO DIFF WBC: CPT

## 2024-11-20 PROCEDURE — 84100 ASSAY OF PHOSPHORUS: CPT

## 2024-11-20 PROCEDURE — 85610 PROTHROMBIN TIME: CPT

## 2024-11-20 PROCEDURE — 87799 DETECT AGENT NOS DNA QUANT: CPT

## 2024-11-20 PROCEDURE — 80061 LIPID PANEL: CPT

## 2024-11-20 PROCEDURE — 83615 LACTATE (LD) (LDH) ENZYME: CPT

## 2024-11-20 PROCEDURE — 87070 CULTURE OTHR SPECIMN AEROBIC: CPT

## 2024-11-20 PROCEDURE — 93005 ELECTROCARDIOGRAM TRACING: CPT

## 2024-11-20 PROCEDURE — 82247 BILIRUBIN TOTAL: CPT

## 2024-11-20 PROCEDURE — 83540 ASSAY OF IRON: CPT

## 2024-11-20 PROCEDURE — 83550 IRON BINDING TEST: CPT

## 2024-11-20 PROCEDURE — 36415 COLL VENOUS BLD VENIPUNCTURE: CPT

## 2024-11-20 PROCEDURE — 85730 THROMBOPLASTIN TIME PARTIAL: CPT

## 2024-11-20 PROCEDURE — 74177 CT ABD & PELVIS W/CONTRAST: CPT | Mod: 26,MC

## 2024-11-20 PROCEDURE — 83036 HEMOGLOBIN GLYCOSYLATED A1C: CPT

## 2024-11-20 PROCEDURE — 76700 US EXAM ABDOM COMPLETE: CPT

## 2024-11-20 PROCEDURE — 82945 GLUCOSE OTHER FLUID: CPT

## 2024-11-20 PROCEDURE — 87015 SPECIMEN INFECT AGNT CONCNTJ: CPT

## 2024-11-20 PROCEDURE — 86900 BLOOD TYPING SEROLOGIC ABO: CPT

## 2024-11-20 PROCEDURE — 82248 BILIRUBIN DIRECT: CPT

## 2024-11-20 PROCEDURE — 49083 ABD PARACENTESIS W/IMAGING: CPT

## 2024-11-20 PROCEDURE — 82728 ASSAY OF FERRITIN: CPT

## 2024-11-20 PROCEDURE — 84157 ASSAY OF PROTEIN OTHER: CPT

## 2024-11-20 PROCEDURE — A9579: CPT

## 2024-11-20 PROCEDURE — 83605 ASSAY OF LACTIC ACID: CPT

## 2024-11-20 PROCEDURE — 74183 MRI ABD W/O CNTR FLWD CNTR: CPT | Mod: MC

## 2024-11-20 PROCEDURE — 87075 CULTR BACTERIA EXCEPT BLOOD: CPT

## 2024-11-20 PROCEDURE — 87040 BLOOD CULTURE FOR BACTERIA: CPT

## 2024-11-20 PROCEDURE — 82140 ASSAY OF AMMONIA: CPT

## 2024-11-20 PROCEDURE — 80074 ACUTE HEPATITIS PANEL: CPT

## 2024-11-20 PROCEDURE — 86901 BLOOD TYPING SEROLOGIC RH(D): CPT

## 2024-11-20 PROCEDURE — 83735 ASSAY OF MAGNESIUM: CPT

## 2024-11-20 PROCEDURE — 99285 EMERGENCY DEPT VISIT HI MDM: CPT

## 2024-11-20 RX ORDER — CHLORDIAZEPOXIDE HCL 10 MG
50 CAPSULE ORAL
Refills: 0 | Status: DISCONTINUED | OUTPATIENT
Start: 2024-11-20 | End: 2024-11-21

## 2024-11-20 RX ORDER — CIPROFLOXACIN HCL 750 MG
400 TABLET ORAL ONCE
Refills: 0 | Status: COMPLETED | OUTPATIENT
Start: 2024-11-20 | End: 2024-11-20

## 2024-11-20 RX ORDER — FUROSEMIDE 40 MG/1
40 TABLET ORAL DAILY
Refills: 0 | Status: DISCONTINUED | OUTPATIENT
Start: 2024-11-20 | End: 2024-11-27

## 2024-11-20 RX ORDER — LANOLIN ALCOHOL/MO/W.PET/CERES
100 CREAM (GRAM) TOPICAL DAILY
Refills: 0 | Status: DISCONTINUED | OUTPATIENT
Start: 2024-11-20 | End: 2024-11-26

## 2024-11-20 RX ORDER — GLUCOSAMINE SULFATE DIPOT CHLR 500 MG
1 CAPSULE ORAL DAILY
Refills: 0 | Status: DISCONTINUED | OUTPATIENT
Start: 2024-11-20 | End: 2024-11-28

## 2024-11-20 RX ORDER — METHADONE HYDROCHLORIDE 5 MG/1
40 TABLET ORAL ONCE
Refills: 0 | Status: DISCONTINUED | OUTPATIENT
Start: 2024-11-20 | End: 2024-11-21

## 2024-11-20 RX ORDER — PANTOPRAZOLE SODIUM 40 MG/1
40 TABLET, DELAYED RELEASE ORAL
Refills: 0 | Status: DISCONTINUED | OUTPATIENT
Start: 2024-11-20 | End: 2024-11-28

## 2024-11-20 RX ORDER — ENOXAPARIN SODIUM 30 MG/.3ML
40 INJECTION SUBCUTANEOUS EVERY 24 HOURS
Refills: 0 | Status: COMPLETED | OUTPATIENT
Start: 2024-11-20 | End: 2024-11-24

## 2024-11-20 RX ORDER — METRONIDAZOLE 500 MG/1
500 TABLET ORAL ONCE
Refills: 0 | Status: COMPLETED | OUTPATIENT
Start: 2024-11-20 | End: 2024-11-20

## 2024-11-20 RX ADMIN — METRONIDAZOLE 100 MILLIGRAM(S): 500 TABLET ORAL at 23:00

## 2024-11-20 RX ADMIN — Medication 200 MILLIGRAM(S): at 21:23

## 2024-11-20 NOTE — ED PROVIDER NOTE - OBJECTIVE STATEMENT
49-year-old male with a past medical history of EtOH abuse, chronic back pain, substance abuse who presents to the ED for evaluation.  Reports that he has been having abdominal distention and jaundice for the past 2 months and symptoms progressively getting worse, so came to the ED for evaluation.  Also endorses intermittent abdominal pain for the past several days.  Denies fever, shortness of breath, chest pain, nausea, vomiting, urinary symptoms, and change with bowel movement.

## 2024-11-20 NOTE — H&P ADULT - NSHPLABSRESULTS_GEN_ALL_CORE
LABS:                        9.9    9.50  )-----------( 88       ( 21 Nov 2024 07:08 )             28.1     11-21    137  |  96[L]  |  7[L]  ----------------------------<  80  2.7[LL]   |  26  |  0.9    Ca    7.9[L]      21 Nov 2024 11:15  Phos  3.0     11-21  Mg     2.1     11-21    TPro  5.9[L]  /  Alb  3.0[L]  /  TBili  22.9[HH]  /  DBili  >17.2[H]  /  AST  478[H]  /  ALT  129[H]  /  AlkPhos  524[H]  11-21    PT/INR - ( 21 Nov 2024 07:08 )   PT: 16.70 sec;   INR: 1.41 ratio         PTT - ( 21 Nov 2024 07:08 )  PTT:34.9 sec  Urinalysis Basic - ( 21 Nov 2024 11:15 )    Color: x / Appearance: x / SG: x / pH: x  Gluc: 80 mg/dL / Ketone: x  / Bili: x / Urobili: x   Blood: x / Protein: x / Nitrite: x   Leuk Esterase: x / RBC: x / WBC x   Sq Epi: x / Non Sq Epi: x / Bacteria: x       CAPILLARY BLOOD GLUCOSE            Urinalysis Basic - ( 21 Nov 2024 11:15 )    Color: x / Appearance: x / SG: x / pH: x  Gluc: 80 mg/dL / Ketone: x  / Bili: x / Urobili: x   Blood: x / Protein: x / Nitrite: x   Leuk Esterase: x / RBC: x / WBC x   Sq Epi: x / Non Sq Epi: x / Bacteria: x        RADIOLOGY & ADDITIONAL TESTS:    Consultant(s) Notes Reviewed:  [x ] YES  [ ] NO  Care Discussed with Consultants/Other Providers [ x] YES  [ ] NO  Imaging Personally Reviewed:  [ ] YES  [ ] NO

## 2024-11-20 NOTE — ED PROVIDER NOTE - PHYSICAL EXAMINATION
CONSTITUTIONAL: in no apparent distress. Jaundice noticed  ENT: Hearing is intact with good acuity to spoken voice.  Patient is speaking clearly, not muffled and airway is intact.   RESPIRATORY: No signs of respiratory distress. Lung sounds are clear in all lobes bilaterally without rales, rhonchi, or wheezes.  CARDIOVASCULAR: Regular rate and rhythm.   GI: Mild abd distention noticed. Abdomen is soft, non-tender. Bowel sounds are present and normoactive in all four quadrants. No masses are noted.   NEURO: A & O x 3. Normal speech. No focal deficit.  PSYCHOLOGICAL: Appropriate mood and affect. Good judgement and insight.

## 2024-11-20 NOTE — ED PROVIDER NOTE - NSTIMEPROVIDERCAREINITIATE_GEN_ER
Quality 431: Preventive Care And Screening: Unhealthy Alcohol Use - Screening: Patient not identified as an unhealthy alcohol user when screened for unhealthy alcohol use using a systematic screening method
20-Nov-2024 18:15
Quality 130: Documentation Of Current Medications In The Medical Record: Current Medications Documented
Detail Level: Detailed
Quality 226: Preventive Care And Screening: Tobacco Use: Screening And Cessation Intervention: Patient screened for tobacco use and is an ex/non-smoker

## 2024-11-20 NOTE — ED PROVIDER NOTE - ATTENDING APP SHARED VISIT CONTRIBUTION OF CARE
48-year-old male past med history EtOH induced fibrosis, history of complete abuse, currently still drinking alcohol presenting for evaluation of abdominal distention and jaundice for the past 2 months.  In addition, patient states that his belly is "hard".  Reports bright yellow urine without any dysuria frequency or urgency, no diarrhea, no bloody emesis.  Last alcohol intake today.  Denies any fever or chills, no abdominal pain or any other additional acute complaints. Patient is resting in stretcher smiling in no acute distress, there is scleral icterus, skin is jaundiced, PERRL, supple neck, lungs clear to auscultation bilaterally, abdomen is rotund and nontender to palpation, bilateral lower leg pitting edema without unilateral calf tenderness to palpation, patient awake alert, pleasant and cooperative.  Plan: Labs, imaging, plan to admit.

## 2024-11-20 NOTE — H&P ADULT - HISTORY OF PRESENT ILLNESS
47 y/o man w PMHx of chronic back pain, h/o opiate and heroin abuse w/o IVDU, now on Methadone from [facility] [phone], EtOH abuse since 9/2023, no h/o withdrawal seizures, jaundice since early 2024, recent dx cirrhosis, presented to ED on 19th of june for sepsis 2/2 to colitis, treated with antibiotics  and was discharged on 26th of June  presenting again today with abdominal pain from past several days. As per patient her abdominal distension and pain have been getting worse lately , with yellowing of skin and orange colored urine .   As per patient been drinking alcohol since 2023 and the last drink was this morning but denied any other recent drug use .   Denies any tremors, insomnia , seizures , vomiting, diarrhea, constipation, cough, sore throat, fevers, chills.    HCP brother Reji Vargas [phone]    vitally stable in Ed   On labs WBCs 11.77 e Neutrophil predom ,  , deranged coags ,and LFTs  elevated Total bili of 22.9 , bilirubin direct 16.7 , indirect bilirubin of 6.2 ,lipase of 107     On CT A/P : Diffuse colonic mural thickening, which may represent pancolitis in the   appropriate clinical setting. No abscess.  Marked hepatic steatosis., Nonspecific gallbladder distention.    rcvd cipro and flagyl in ed     admitted for further workup .  49 y/o man w PMHx of chronic back pain, h/o opiate and heroin abuse w/o IVDU, now on Methadone 40 mg QD from [facility] for [facility] [phone], EtOH abuse since 9/2023, no h/o withdrawal seizures, jaundice since early 2024, recent dx cirrhosis, presented to ED on 19th of june for sepsis 2/2 to colitis, treated with antibiotics  and was discharged on 26th of June  presenting again today with abdominal pain from past several days. As per patient her abdominal distension and pain have been getting worse lately , with yellowing of skin and orange colored urine .   As per patient been drinking alcohol since 2023 and the last drink was this morning but denied any other recent drug use .   Denies any tremors, insomnia , seizures , vomiting, diarrhea, constipation, cough, sore throat, fevers, chills.    HCP brother Reji Vargas [phone]    vitally stable in Ed   On labs WBCs 11.77 e Neutrophil predom ,  , deranged coags ,and LFTs  elevated Total bili of 22.9 , bilirubin direct 16.7 , indirect bilirubin of 6.2 ,lipase of 107     On CT A/P : Diffuse colonic mural thickening, which may represent pancolitis in the   appropriate clinical setting. No abscess.  Marked hepatic steatosis., Nonspecific gallbladder distention.    rcvd cipro and flagyl in ed     admitted for further workup .  49 y/o man w PMHx of chronic back pain,Chronic LE swelling,  h/o opiate and heroin abuse w/o IVDU, now on Methadone 40 mg QD from [facility] [phone], EtOH abuse since 9/2023, no h/o withdrawal seizures, jaundice since early 2024, recent dx cirrhosis, presented to ED on 19th of june for sepsis 2/2 to colitis, treated with antibiotics  and was discharged on 26th of June  presenting again today with abdominal pain from past several days. As per patient her abdominal distension and pain have been getting worse lately with puking bile atleast twice daily with nausea and decrease Apetite  .   As per patient been drinking alcohol since 2023 and then stopped but restarted drinking 2 months back with drinking 2 pints of vodka daily and the last drink was this morning but denied any other recent drug use .   Denies any tremors, insomnia , seizures , diarrhea, constipation, cough, sore throat, fevers, chills.    HCP brother Reji Vargas [phone]    vitally stable in Ed   On labs WBCs 11.77 e Neutrophil predom ,  , deranged coags ,and LFTs  elevated Total bili of 22.9 , bilirubin direct 16.7 , indirect bilirubin of 6.2 ,lipase of 107     On CT A/P : Diffuse colonic mural thickening, which may represent pancolitis in the   appropriate clinical setting. No abscess.  Marked hepatic steatosis., Nonspecific gallbladder distention.    rcvd cipro and flagyl in ed     admitted for further workup .  A 48-year-old male patient with a history of chronic back pain and chronic lower extremity swelling presented to the emergency department today complaining of worsening abdominal pain for the past several days. He has a history of opiate and heroin abuse (non-IV drug use) and is currently prescribed methadone 40mg daily through [facility] ([phone]). He also has a history of alcohol abuse beginning in September 2023. While he reports a period of sobriety, he resumed drinking two months ago, consuming approximately two pints of vodka daily, with his last drink this morning. He denies any other recent drug use.    The patient was previously admitted to the ED on June 19th for sepsis secondary to colitis and was discharged on June 26th after receiving antibiotic treatment. Since his discharge, his abdominal pain and distension have progressively worsened, accompanied by nausea, decreased appetite, and vomiting bile at least twice daily. He denies experiencing tremors, insomnia, seizures, diarrhea, constipation, cough, sore throat, fevers, or chills. He also reports the onset of jaundice in early 2024 and was recently diagnosed with cirrhosis.    The patient's brother, Reji Vargas ([phone]), is a healthcare professional and can be contacted for further information.    As per patient he is on bunch of medication but stopped taking them since last few months.     vitally stable in Ed   On labs WBCs 11.77 e Neutrophil predom ,  , deranged coags ,and LFTs  elevated Total bili of 22.9 , bilirubin direct 16.7 , indirect bilirubin of 6.2 ,lipase of 107     On CT A/P : Diffuse colonic mural thickening, which may represent pancolitis in the   appropriate clinical setting. No abscess.  Marked hepatic steatosis., Nonspecific gallbladder distention.    rcvd cipro and flagyl in ed     admitted for further workup .

## 2024-11-20 NOTE — H&P ADULT - NSICDXPASTMEDICALHX_GEN_ALL_CORE_FT
PAST MEDICAL HISTORY:  Cirrhosis     Moderate heroin dependence in sustained remission     Opiate abuse, episodic

## 2024-11-20 NOTE — H&P ADULT - NSHPPHYSICALEXAM_GEN_ALL_CORE
CONSTITUTIONAL: in no apparent distress. Jaundice noticed  	ENT: Hearing is intact with good acuity to spoken voice.  Patient is speaking clearly, not muffled and airway is intact.   	RESPIRATORY: No signs of respiratory distress. Lung sounds are clear in all lobes bilaterally without rales, rhonchi, or wheezes.  	CARDIOVASCULAR: Regular rate and rhythm.   	GI: Mild abd distention noticed. Abdomen is soft, non-tender. Bowel sounds are present and normoactive in all four quadrants. No masses are noted.   	NEURO: A & O x 3. Normal speech. No focal deficit.  PSYCHOLOGICAL: Appropriate mood and affect. Good judgement and insight. CONSTITUTIONAL: in no apparent distress. yellow skin   EYES: yellow conjunctiva   RESPIRATORY: No signs of respiratory distress. Lung sounds are clear in all lobes bilaterally without rales, rhonchi, or wheezes.  CARDIOVASCULAR: Regular rate and rhythm.   GI: distended belly , no tenderness   NEURO: A & O x 3. Normal speech. No focal deficit.  PSYCHOLOGICAL: Appropriate mood and affect. Good judgement and insight. CONSTITUTIONAL: in no apparent distress. yellow skin , able to ambulate   EYES: yellow conjunctiva   RESPIRATORY: No signs of respiratory distress. Lung sounds are clear in all lobes bilaterally without rales, rhonchi, or wheezes.  CARDIOVASCULAR: Regular rate and rhythm.   GI: distended belly , no tenderness   Extremities: BLE  swollen , L > R , 2+ pitting edema   NEURO: A & O x 3. Normal speech. No focal deficit.  PSYCHOLOGICAL: Appropriate mood and affect. Good judgement and insight.

## 2024-11-20 NOTE — ED ADULT TRIAGE NOTE - CHIEF COMPLAINT QUOTE
Patient complaining of abdominal distention, jaundice x2 months, pt also states he drinks two pints of vodka a day

## 2024-11-20 NOTE — H&P ADULT - ATTENDING COMMENTS
A 48-year-old male patient with a history of chronic back pain and chronic lower extremity swelling presented to the emergency department today complaining of worsening abdominal pain for the past several days. He has a history of opiate and heroin abuse (non-IV drug use) and is currently prescribed methadone 40mg daily through [facility] ([phone]). He also has a history of alcohol abuse beginning in September 2023. While he reports a period of sobriety, he resumed drinking two months ago, consuming approximately two pints of vodka daily, with his last drink this morning. He denies any other recent drug use.  Pt reports not taking his medication at home for months     Vital Signs Last 24 Hrs  T(F): 98.2 (21 Nov 2024 08:17), Max: 98.6 (21 Nov 2024 00:50)  HR: 89 (21 Nov 2024 08:17) (75 - 99)  BP: 112/78 (21 Nov 2024 08:17) (102/61 - 138/89)  RR: 18 (21 Nov 2024 00:50) (18 - 18)  SpO2: 96% (21 Nov 2024 08:17) (95% - 98%)    PHYSICAL EXAM:  GENERAL: NAD, poorly-groomed, poorly-developed  HEAD:  Atraumatic, Normocephalic  EYES: EOMI, scleral icterus   ENMT: Moist mucous membranes, Good dentition, no thrush  NECK: Supple, No JVD  CHEST/LUNG: Clear to auscultation bilaterally, good air entry, non-labored breathing  HEART: RRR; S1/S2, No murmur  ABDOMEN: firm, Nontender, +distended; Bowel sounds present  VASCULAR: Normal pulses, Normal capillary refill  EXTREMITIES: No calf tenderness, No cyanosis, +3 edema LE  LYMPH: Normal; No lymphadenopathy noted  SKIN: Warm, Intact, jaundice   PSYCH: Normal mood, Normal affect  NERVOUS SYSTEM:  A/O x3, Good concentration; CN 2-12 intact, No focal deficits      #Pancolitis :   -SIRS negative ,elevated WBC, no tachycardia, tachypnea, fever  -WBCs 11.77 e Neutrophil predom   -On CT A/P : Diffuse colonic mural thickening, which may represent pancolitis in the   appropriate clinical setting.   -c/w cirpo and flagyl   -f/u infectious workup , ESR , CRP   -f/u repeat CBC to trend WBC  -can consider GI c/s for possible colonoscopy or outpt f/u     #EtOH abuse   #Cirrhosis 2/2 Alcoholic Hepatitis  #Jaundice   -Pt has been drinking 2.5 bottles of hard alcohol daily, last drink around 18:00 11/21  -f/u toxicology screen   - [facility] protocol - ativan due to liver disease   - Addiction and CATCH consulted   - Continue thiamine, folate, B12, multivitamin; takes at home.  - f/u hepatitis panel, EBV, CMV  - f/u Lipids, A1c  -monitor LFTs , bilirubin level    - Hepatology consulted   - to do diagnostic therapeutic paracentesis   - MELD score daily     #Thrombocytopenia   -PLT 88  -trend CBC for PLT   -monitor for any sign of bleed  -active Tns     #BLE swelling   -L>R with 2+ pitting edema .   -were on lasix 40 PO daily , but  pt is noncomplaint   -resumed lasix 40 PO daily      #chronic back pain  #h/o opiate and heroin abuse w/o IVDU  Denies any current use   - Call [facility] [phone] to confirm   -on Methadone 40 mg daily   - Monitor qtc periodically                                                                              # DVT prophylaxis: Lovenox   # GI prophylaxis: Pantoprazole   # Diet: Regular   # Activity: ATT   # Code status: FULL CODE   # Disposition: Medicine floor       med rec confirmed but patient is not taking any of his meds from last few months

## 2024-11-20 NOTE — H&P ADULT - ASSESSMENT
47 y/o man w PMHx of chronic back pain, h/o opiate and heroin abuse w/o IVDU, now on Methadone from [facility] [phone], EtOH abuse since 9/2023, no h/o withdrawal seizures, jaundice since early 2024, recent dx cirrhosis, presented to ED on 19th of june for sepsis 2/2 to colitis, treated with antibiotics  and was discharged on 26th of June  presenting again today with abdominal pain from past several days. As per patient her abdominal distension and pain have been getting worse lately , with yellowing of skin and orange colored urine .   As per patient been drinking alcohol since 2023 and the last drink was this morning but denied any other recent drug use .   Denies any tremors, insomnia , seizures , vomiting, diarrhea, constipation, cough, sore throat, fevers, chills.    HCP brother Reji Vargas [phone]    vitally stable in Ed   On labs WBCs 11.77 e Neutrophil predom ,  , deranged coags ,and LFTs  elevated Total bili of 22.9 , bilirubin direct 16.7 , indirect bilirubin of 6.2 ,lipase of 107     On CT A/P : Diffuse colonic mural thickening, which may represent pancolitis in the   appropriate clinical setting. No abscess.  Marked hepatic steatosis., Nonspecific gallbladder distention.    rcvd cipro and flagyl in ed           #Pancolitis :   -SIRS negative ,elevated WBC, no tachycardia, tachypnea, fever  -WBCs 11.77 e Neutrophil predom   -On CT A/P : Diffuse colonic mural thickening, which may represent pancolitis in the   appropriate clinical setting.on.  -rcvd cipro and flagyl in ed   PLAN :   -pain control   -advance Diet as tolerated   -c/w cirpo and flagyl   -f/u infectious workup , ESR , CRP   -f/u repeat CBC to trend WBC  -can consider GI c/s for possible colonoscopy or outpt f/u         #EtOH abuse   #Cirrhosis 2/2 Alcoholic Hepatitis  #Jaundice   -As per patient been drinking alcohol since 2023 and the last drink was this morning but denied any other recent drug use .   -Denies any tremors, insomnia , seizures   PLAN :   -f/u alcohol level  -f/u toxicology screen   - [facility] protocol   - Addiction and CATCH consulted   - Continue thiamine, folate, B12, multivitamin; takes at home.  - f/u ammonia   - f/u hepatitis panel, EBV, CMV  - f/u Lipids, A1c   - Hepatology consulted     #Low PLT 2/2 to alcohol abuse :   -  -trend CBC for PLT   -monitor for any sign of bleed  -active Tns     #RLE ulcer   #BLE swelling   Lesion is healing well. Pt has many tattoos but no apparent surrounding erythema, no induration. Does not appear acutely infected.   - s/p ancef  - VA duplex BLE to eval for DVT   - PT consulted       #chronic back pain  #h/o opiate and heroin abuse w/o IVDU  Denies any current use   - Call [facility] for [facility] [phone] to confirm                                                                                 # DVT prophylaxis: Lovenox   # GI prophylaxis: Pantoprazole   # Diet: Regular   # Activity: ATT   # Code status: FULL CODE   # Disposition: Medicine floor                                                                 A 48-year-old male patient with a history of chronic back pain and chronic lower extremity swelling presented to the emergency department today complaining of worsening abdominal pain for the past several days. He has a history of opiate and heroin abuse (non-IV drug use) and is currently prescribed methadone 40mg daily through [facility] ([phone]). He also has a history of alcohol abuse beginning in September 2023. While he reports a period of sobriety, he resumed drinking two months ago, consuming approximately two pints of vodka daily, with his last drink this morning. He denies any other recent drug use.    The patient was previously admitted to the ED on June 19th for sepsis secondary to colitis and was discharged on June 26th after receiving antibiotic treatment. Since his discharge, his abdominal pain and distension have progressively worsened, accompanied by nausea, decreased appetite, and vomiting bile at least twice daily.       #Pancolitis :   -SIRS negative ,elevated WBC, no tachycardia, tachypnea, fever  -WBCs 11.77 e Neutrophil predom   -On CT A/P : Diffuse colonic mural thickening, which may represent pancolitis in the   appropriate clinical setting.   -rcvd cipro and flagyl in ed   PLAN :   -pain control   -advance Diet as tolerated   -c/w cirpo and flagyl   -f/u infectious workup , ESR , CRP   -f/u repeat CBC to trend WBC  -can consider GI c/s for possible colonoscopy or outpt f/u         #EtOH abuse   #Cirrhosis 2/2 Alcoholic Hepatitis  #Jaundice   -As per patient been drinking alcohol since 2023 and the last drink was this morning but denied any other recent drug use .   -Denies any tremors, insomnia , seizures   PLAN :   -f/u alcohol level  -f/u toxicology screen   - [facility] protocol   - Addiction and CATCH consulted   - Continue thiamine, folate, B12, multivitamin; takes at home.  - f/u ammonia   - f/u hepatitis panel, EBV, CMV  - f/u Lipids, A1c   - Hepatology consulted     #Low PLT 2/2 to alcohol abuse :   -  -trend CBC for PLT   -monitor for any sign of bleed  -active Tns       #BLE swelling   -L>R with 2+ pitting edema .   -were on lasix 40 PO daily , but  pt is noncomplaint       #chronic back pain  #h/o opiate and heroin abuse w/o IVDU  Denies any current use   - Call [facility] [phone] to confirm   -on Methadone 40 mg daily                                                                                 # DVT prophylaxis: Lovenox   # GI prophylaxis: Pantoprazole   # Diet: Regular   # Activity: ATT   # Code status: FULL CODE   # Disposition: Medicine floor       med rec confirmed but patient is not taking any of his meds from last few months                                                                 A 48-year-old male patient with a history of chronic back pain and chronic lower extremity swelling presented to the emergency department today complaining of worsening abdominal pain for the past several days. He has a history of opiate and heroin abuse (non-IV drug use) and is currently prescribed methadone 40mg daily through [facility] ([phone]). He also has a history of alcohol abuse beginning in September 2023. While he reports a period of sobriety, he resumed drinking two months ago, consuming approximately two pints of vodka daily, with his last drink this morning. He denies any other recent drug use.    The patient was previously admitted to the ED on June 19th for sepsis secondary to colitis and was discharged on June 26th after receiving antibiotic treatment. Since his discharge, his abdominal pain and distension have progressively worsened, accompanied by nausea, decreased appetite, and vomiting bile at least twice daily.       #Pancolitis :   -SIRS negative ,elevated WBC, no tachycardia, tachypnea, fever  -WBCs 11.77 e Neutrophil predom   -On CT A/P : Diffuse colonic mural thickening, which may represent pancolitis in the   appropriate clinical setting.   -rcvd cipro and flagyl in ed   PLAN :   -pain control   -advance Diet as tolerated   -c/w cirpo and flagyl   -f/u infectious workup , ESR , CRP   -f/u repeat CBC to trend WBC  -can consider GI c/s for possible colonoscopy or outpt f/u         #EtOH abuse   #Cirrhosis 2/2 Alcoholic Hepatitis  #Jaundice   -As per patient been drinking alcohol since 2023 and the last drink was this morning but denied any other recent drug use .   -Denies any tremors, insomnia , seizures   PLAN :   -f/u alcohol level  -f/u toxicology screen   - [facility] protocol   - Addiction and CATCH consulted   - Continue thiamine, folate, B12, multivitamin; takes at home.  - f/u ammonia   - f/u hepatitis panel, EBV, CMV  - f/u Lipids, A1c  -monitor LFTs , bilirubin level    - Hepatology consulted     #Low PLT 2/2 to alcohol abuse :   -  -trend CBC for PLT   -monitor for any sign of bleed  -active Tns       #BLE swelling   -L>R with 2+ pitting edema .   -were on lasix 40 PO daily , but  pt is noncomplaint   -resumed lasix 40 PO daily      #chronic back pain  #h/o opiate and heroin abuse w/o IVDU  Denies any current use   - Call [facility] [phone] to confirm   -on Methadone 40 mg daily                                                                                 # DVT prophylaxis: Lovenox   # GI prophylaxis: Pantoprazole   # Diet: Regular   # Activity: ATT   # Code status: FULL CODE   # Disposition: Medicine floor       med rec confirmed but patient is not taking any of his meds from last few months

## 2024-11-20 NOTE — ED PROVIDER NOTE - CLINICAL SUMMARY MEDICAL DECISION MAKING FREE TEXT BOX
48-year-old male with liver cirrhosis and EtOH misuse disorder.  Patient is nontoxic-appearing, abdomen is nontender to palpation.  CT abdomen pelvis shows small ascites and colitis.  Antibiotics were ordered, abdominal paracentesis not performed due to low volume ascites, nontender abdomen, lack of fever. Patient admitted for further management and care. Vital signs and prior records were reviewed.

## 2024-11-20 NOTE — ED PROVIDER NOTE - CARE PLAN
Principal Discharge DX:	Jaundice  Secondary Diagnosis:	Colitis  Secondary Diagnosis:	Elevated LFTs   1

## 2024-11-21 LAB
A1C WITH ESTIMATED AVERAGE GLUCOSE RESULT: <4 % — LOW (ref 4–5.6)
ALBUMIN SERPL ELPH-MCNC: 2.9 G/DL — LOW (ref 3.5–5.2)
ALBUMIN SERPL ELPH-MCNC: 2.9 G/DL — LOW (ref 3.5–5.2)
ALBUMIN SERPL ELPH-MCNC: 3 G/DL — LOW (ref 3.5–5.2)
ALP SERPL-CCNC: 491 U/L — HIGH (ref 30–115)
ALP SERPL-CCNC: 509 U/L — HIGH (ref 30–115)
ALP SERPL-CCNC: 524 U/L — HIGH (ref 30–115)
ALT FLD-CCNC: 120 U/L — HIGH (ref 0–41)
ALT FLD-CCNC: 123 U/L — HIGH (ref 0–41)
ALT FLD-CCNC: 129 U/L — HIGH (ref 0–41)
AMMONIA BLD-MCNC: 34 UMOL/L — SIGNIFICANT CHANGE UP (ref 11–55)
ANION GAP SERPL CALC-SCNC: 11 MMOL/L — SIGNIFICANT CHANGE UP (ref 7–14)
ANION GAP SERPL CALC-SCNC: 13 MMOL/L — SIGNIFICANT CHANGE UP (ref 7–14)
ANION GAP SERPL CALC-SCNC: 14 MMOL/L — SIGNIFICANT CHANGE UP (ref 7–14)
ANION GAP SERPL CALC-SCNC: 15 MMOL/L — HIGH (ref 7–14)
APTT BLD: 34.9 SEC — SIGNIFICANT CHANGE UP (ref 27–39.2)
AST SERPL-CCNC: 455 U/L — HIGH (ref 0–41)
AST SERPL-CCNC: 471 U/L — HIGH (ref 0–41)
AST SERPL-CCNC: 478 U/L — HIGH (ref 0–41)
BASOPHILS # BLD AUTO: 0.05 K/UL — SIGNIFICANT CHANGE UP (ref 0–0.2)
BASOPHILS NFR BLD AUTO: 0.5 % — SIGNIFICANT CHANGE UP (ref 0–1)
BILIRUB DIRECT SERPL-MCNC: 15.4 MG/DL — HIGH (ref 0–0.3)
BILIRUB DIRECT SERPL-MCNC: 16.1 MG/DL — HIGH (ref 0–0.3)
BILIRUB DIRECT SERPL-MCNC: 16.1 MG/DL — HIGH (ref 0–0.3)
BILIRUB DIRECT SERPL-MCNC: 16.2 MG/DL — HIGH (ref 0–0.3)
BILIRUB DIRECT SERPL-MCNC: >17.2 MG/DL — HIGH (ref 0–0.3)
BILIRUB INDIRECT FLD-MCNC: 4.8 MG/DL — HIGH (ref 0.2–1.2)
BILIRUB INDIRECT FLD-MCNC: 5.8 MG/DL — HIGH (ref 0.2–1.2)
BILIRUB INDIRECT FLD-MCNC: 5.9 MG/DL — HIGH (ref 0.2–1.2)
BILIRUB INDIRECT FLD-MCNC: <5.7 MG/DL — HIGH (ref 0.2–1.2)
BILIRUB SERPL-MCNC: 20.2 MG/DL — CRITICAL HIGH (ref 0.2–1.2)
BILIRUB SERPL-MCNC: 21.9 MG/DL — CRITICAL HIGH (ref 0.2–1.2)
BILIRUB SERPL-MCNC: 21.9 MG/DL — CRITICAL HIGH (ref 0.2–1.2)
BILIRUB SERPL-MCNC: 22 MG/DL — CRITICAL HIGH (ref 0.2–1.2)
BILIRUB SERPL-MCNC: 22.9 MG/DL — CRITICAL HIGH (ref 0.2–1.2)
BUN SERPL-MCNC: 7 MG/DL — LOW (ref 10–20)
BUN SERPL-MCNC: 8 MG/DL — LOW (ref 10–20)
CALCIUM SERPL-MCNC: 7.2 MG/DL — LOW (ref 8.4–10.4)
CALCIUM SERPL-MCNC: 7.9 MG/DL — LOW (ref 8.4–10.5)
CALCIUM SERPL-MCNC: 7.9 MG/DL — LOW (ref 8.4–10.5)
CALCIUM SERPL-MCNC: 8.1 MG/DL — LOW (ref 8.4–10.5)
CHLORIDE SERPL-SCNC: 100 MMOL/L — SIGNIFICANT CHANGE UP (ref 98–110)
CHLORIDE SERPL-SCNC: 96 MMOL/L — LOW (ref 98–110)
CHLORIDE SERPL-SCNC: 99 MMOL/L — SIGNIFICANT CHANGE UP (ref 98–110)
CHLORIDE SERPL-SCNC: 99 MMOL/L — SIGNIFICANT CHANGE UP (ref 98–110)
CHOLEST SERPL-MCNC: 118 MG/DL — SIGNIFICANT CHANGE UP
CO2 SERPL-SCNC: 25 MMOL/L — SIGNIFICANT CHANGE UP (ref 17–32)
CO2 SERPL-SCNC: 26 MMOL/L — SIGNIFICANT CHANGE UP (ref 17–32)
CREAT SERPL-MCNC: 0.7 MG/DL — SIGNIFICANT CHANGE UP (ref 0.7–1.5)
CREAT SERPL-MCNC: 0.9 MG/DL — SIGNIFICANT CHANGE UP (ref 0.7–1.5)
CRP SERPL-MCNC: 18.1 MG/L — HIGH
EGFR: 105 ML/MIN/1.73M2 — SIGNIFICANT CHANGE UP
EGFR: 113 ML/MIN/1.73M2 — SIGNIFICANT CHANGE UP
EOSINOPHIL # BLD AUTO: 0.06 K/UL — SIGNIFICANT CHANGE UP (ref 0–0.7)
EOSINOPHIL NFR BLD AUTO: 0.6 % — SIGNIFICANT CHANGE UP (ref 0–8)
ERYTHROCYTE [SEDIMENTATION RATE] IN BLOOD: 8 MM/HR — SIGNIFICANT CHANGE UP (ref 0–10)
ESTIMATED AVERAGE GLUCOSE: <68 MG/DL — SIGNIFICANT CHANGE UP (ref 68–114)
GLUCOSE SERPL-MCNC: 65 MG/DL — LOW (ref 70–99)
GLUCOSE SERPL-MCNC: 65 MG/DL — LOW (ref 70–99)
GLUCOSE SERPL-MCNC: 73 MG/DL — SIGNIFICANT CHANGE UP (ref 70–99)
GLUCOSE SERPL-MCNC: 80 MG/DL — SIGNIFICANT CHANGE UP (ref 70–99)
HCT VFR BLD CALC: 28.1 % — LOW (ref 42–52)
HDLC SERPL-MCNC: 12 MG/DL — LOW
HGB BLD-MCNC: 9.9 G/DL — LOW (ref 14–18)
IMM GRANULOCYTES NFR BLD AUTO: 0.6 % — HIGH (ref 0.1–0.3)
INR BLD: 1.41 RATIO — HIGH (ref 0.65–1.3)
IRON SATN MFR SERPL: 120 UG/DL — SIGNIFICANT CHANGE UP (ref 35–150)
LACTATE SERPL-SCNC: 2.6 MMOL/L — HIGH (ref 0.7–2)
LACTATE SERPL-SCNC: 2.8 MMOL/L — HIGH (ref 0.7–2)
LACTATE SERPL-SCNC: 2.8 MMOL/L — HIGH (ref 0.7–2)
LACTATE SERPL-SCNC: 3.4 MMOL/L — HIGH (ref 0.7–2)
LIPID PNL WITH DIRECT LDL SERPL: 79 MG/DL — SIGNIFICANT CHANGE UP
LYMPHOCYTES # BLD AUTO: 1.6 K/UL — SIGNIFICANT CHANGE UP (ref 1.2–3.4)
LYMPHOCYTES # BLD AUTO: 16.8 % — LOW (ref 20.5–51.1)
MAGNESIUM SERPL-MCNC: 1.7 MG/DL — LOW (ref 1.8–2.4)
MAGNESIUM SERPL-MCNC: 1.8 MG/DL — SIGNIFICANT CHANGE UP (ref 1.8–2.4)
MAGNESIUM SERPL-MCNC: 2.1 MG/DL — SIGNIFICANT CHANGE UP (ref 1.8–2.4)
MCHC RBC-ENTMCNC: 35.2 G/DL — SIGNIFICANT CHANGE UP (ref 32–37)
MCHC RBC-ENTMCNC: 39.8 PG — HIGH (ref 27–31)
MCV RBC AUTO: 112.9 FL — HIGH (ref 80–94)
MONOCYTES # BLD AUTO: 0.52 K/UL — SIGNIFICANT CHANGE UP (ref 0.1–0.6)
MONOCYTES NFR BLD AUTO: 5.5 % — SIGNIFICANT CHANGE UP (ref 1.7–9.3)
NEUTROPHILS # BLD AUTO: 7.21 K/UL — HIGH (ref 1.4–6.5)
NEUTROPHILS NFR BLD AUTO: 76 % — HIGH (ref 42.2–75.2)
NON HDL CHOLESTEROL: 106 MG/DL — SIGNIFICANT CHANGE UP
NRBC # BLD: 0 /100 WBCS — SIGNIFICANT CHANGE UP (ref 0–0)
PHOSPHATE SERPL-MCNC: 3 MG/DL — SIGNIFICANT CHANGE UP (ref 2.1–4.9)
PHOSPHATE SERPL-MCNC: 3 MG/DL — SIGNIFICANT CHANGE UP (ref 2.1–4.9)
PHOSPHATE SERPL-MCNC: 3.3 MG/DL — SIGNIFICANT CHANGE UP (ref 2.1–4.9)
PLATELET # BLD AUTO: 88 K/UL — LOW (ref 130–400)
PMV BLD: 10.6 FL — HIGH (ref 7.4–10.4)
POTASSIUM SERPL-MCNC: 2.7 MMOL/L — CRITICAL LOW (ref 3.5–5)
POTASSIUM SERPL-MCNC: 3.1 MMOL/L — LOW (ref 3.5–5)
POTASSIUM SERPL-MCNC: 3.3 MMOL/L — LOW (ref 3.5–5)
POTASSIUM SERPL-MCNC: 3.5 MMOL/L — SIGNIFICANT CHANGE UP (ref 3.5–5)
POTASSIUM SERPL-SCNC: 2.7 MMOL/L — CRITICAL LOW (ref 3.5–5)
POTASSIUM SERPL-SCNC: 3.1 MMOL/L — LOW (ref 3.5–5)
POTASSIUM SERPL-SCNC: 3.3 MMOL/L — LOW (ref 3.5–5)
POTASSIUM SERPL-SCNC: 3.5 MMOL/L — SIGNIFICANT CHANGE UP (ref 3.5–5)
PROT SERPL-MCNC: 5.6 G/DL — LOW (ref 6–8)
PROT SERPL-MCNC: 5.8 G/DL — LOW (ref 6–8)
PROT SERPL-MCNC: 5.9 G/DL — LOW (ref 6–8)
PROTHROM AB SERPL-ACNC: 16.7 SEC — HIGH (ref 9.95–12.87)
RBC # BLD: 2.49 M/UL — LOW (ref 4.7–6.1)
RBC # FLD: 18.4 % — HIGH (ref 11.5–14.5)
SODIUM SERPL-SCNC: 136 MMOL/L — SIGNIFICANT CHANGE UP (ref 135–146)
SODIUM SERPL-SCNC: 137 MMOL/L — SIGNIFICANT CHANGE UP (ref 135–146)
SODIUM SERPL-SCNC: 138 MMOL/L — SIGNIFICANT CHANGE UP (ref 135–146)
SODIUM SERPL-SCNC: 139 MMOL/L — SIGNIFICANT CHANGE UP (ref 135–146)
TIBC SERPL-MCNC: <137 UG/DL — LOW (ref 220–430)
TRIGL SERPL-MCNC: 134 MG/DL — SIGNIFICANT CHANGE UP
UIBC SERPL-MCNC: <17 UG/DL — LOW (ref 110–370)
WBC # BLD: 9.5 K/UL — SIGNIFICANT CHANGE UP (ref 4.8–10.8)
WBC # FLD AUTO: 9.5 K/UL — SIGNIFICANT CHANGE UP (ref 4.8–10.8)

## 2024-11-21 PROCEDURE — 99223 1ST HOSP IP/OBS HIGH 75: CPT

## 2024-11-21 PROCEDURE — 93010 ELECTROCARDIOGRAM REPORT: CPT

## 2024-11-21 PROCEDURE — 76700 US EXAM ABDOM COMPLETE: CPT | Mod: 26

## 2024-11-21 RX ORDER — LORAZEPAM 2 MG/1
1 TABLET ORAL
Refills: 0 | Status: DISCONTINUED | OUTPATIENT
Start: 2024-11-21 | End: 2024-11-28

## 2024-11-21 RX ORDER — POTASSIUM CHLORIDE 600 MG/1
20 TABLET, EXTENDED RELEASE ORAL
Refills: 0 | Status: COMPLETED | OUTPATIENT
Start: 2024-11-21 | End: 2024-11-21

## 2024-11-21 RX ORDER — CHLORDIAZEPOXIDE HCL 10 MG
CAPSULE ORAL
Refills: 0 | Status: DISCONTINUED | OUTPATIENT
Start: 2024-11-21 | End: 2024-11-21

## 2024-11-21 RX ORDER — METHADONE HYDROCHLORIDE 5 MG/1
40 TABLET ORAL DAILY
Refills: 0 | Status: DISCONTINUED | OUTPATIENT
Start: 2024-11-21 | End: 2024-11-28

## 2024-11-21 RX ORDER — CHLORDIAZEPOXIDE HCL 10 MG
50 CAPSULE ORAL EVERY 6 HOURS
Refills: 0 | Status: DISCONTINUED | OUTPATIENT
Start: 2024-11-21 | End: 2024-11-21

## 2024-11-21 RX ADMIN — POTASSIUM CHLORIDE 50 MILLIEQUIVALENT(S): 600 TABLET, EXTENDED RELEASE ORAL at 18:35

## 2024-11-21 RX ADMIN — Medication 100 MILLIGRAM(S): at 11:09

## 2024-11-21 RX ADMIN — Medication 50 MILLIGRAM(S): at 07:02

## 2024-11-21 RX ADMIN — POTASSIUM CHLORIDE 50 MILLIEQUIVALENT(S): 600 TABLET, EXTENDED RELEASE ORAL at 15:48

## 2024-11-21 RX ADMIN — FUROSEMIDE 40 MILLIGRAM(S): 40 TABLET ORAL at 06:45

## 2024-11-21 RX ADMIN — Medication 1 MILLIGRAM(S): at 11:09

## 2024-11-21 RX ADMIN — PANTOPRAZOLE SODIUM 40 MILLIGRAM(S): 40 TABLET, DELAYED RELEASE ORAL at 06:45

## 2024-11-21 RX ADMIN — POTASSIUM CHLORIDE 50 MILLIEQUIVALENT(S): 600 TABLET, EXTENDED RELEASE ORAL at 13:51

## 2024-11-21 RX ADMIN — LORAZEPAM 1 MILLIGRAM(S): 2 TABLET ORAL at 22:44

## 2024-11-21 RX ADMIN — Medication 25 GRAM(S): at 08:03

## 2024-11-21 RX ADMIN — METHADONE HYDROCHLORIDE 40 MILLIGRAM(S): 5 TABLET ORAL at 11:35

## 2024-11-21 NOTE — PATIENT PROFILE ADULT - NSTOBACCO TYPE_GEN_A_CORE_RD
Cigarettes Cibinqo Pregnancy And Lactation Text: It is unknown if this medication will adversely affect pregnancy or breast feeding.  You should not take this medication if you are currently pregnant or planning a pregnancy or while breastfeeding.

## 2024-11-21 NOTE — PROGRESS NOTE ADULT - SUBJECTIVE AND OBJECTIVE BOX
SUBJECTIVE/OVERNIGHT EVENTS  Today is hospital day 1d. This morning patient was seen and examined at bedside, resting comfortably in bed. No acute or major events overnight.    MEDICATIONS  STANDING MEDICATIONS  enoxaparin Injectable 40 milliGRAM(s) SubCutaneous every 24 hours  folic acid 1 milliGRAM(s) Oral daily  furosemide    Tablet 40 milliGRAM(s) Oral daily  methadone    Tablet 40 milliGRAM(s) Oral daily  pantoprazole    Tablet 40 milliGRAM(s) Oral before breakfast  thiamine Injectable 100 milliGRAM(s) IV Push daily    PRN MEDICATIONS  LORazepam   Injectable 1 milliGRAM(s) IV Push every 1 hour PRN    VITALS  T(F): 98.2 (11-21-24 @ 08:17), Max: 98.6 (11-21-24 @ 00:50)  HR: 89 (11-21-24 @ 08:17) (75 - 99)  BP: 112/78 (11-21-24 @ 08:17) (102/61 - 138/89)  RR: 18 (11-21-24 @ 00:50) (18 - 18)  SpO2: 96% (11-21-24 @ 08:17) (95% - 98%)    Physical Exam:   GENERAL: NAD, lying in bed comfortably  HEAD:  Atraumatic, normocephalic  EYES: EOMI, PERRL  NECK: Supple, trachea midline, no JVD  HEART: Regular rate and rhythm  LUNGS: Unlabored respirations.  Clear to auscultation bilaterally, no crackles, wheezing, or rhonchi  ABDOMEN: Soft, nontender, nondistended, +BS  EXTREMITIES: 2+ peripheral pulses bilaterally. No clubbing, cyanosis, or edema  NERVOUS SYSTEM:  A&Ox3, moving all extremities, no focal deficits       LABS             9.9    9.50  )-----------( 88       ( 11-21-24 @ 07:08 )             28.1     138  |  99  |  7   -------------------------<  65   11-21-24 @ 07:08  3.5  |  26  |  0.7    Ca      8.1     11-21-24 @ 07:08  Phos   3.0     11-21-24 @ 07:08  Mg     1.8     11-21-24 @ 07:08    TPro  5.8  /  Alb  2.9  /  TBili  21.9  /  DBili  16.2  /  AST  471  /  ALT  123  /  AlkPhos  509  /  GGT  x     11-21-24 @ 07:08    PT/INR - ( 11-21-24 @ 07:08 )   PT: 16.70 sec[H];   INR: 1.41 ratio[H]  PTT - ( 11-21-24 @ 07:08 )  PTT:34.9 sec      Urinalysis Basic - ( 21 Nov 2024 07:08 )    Color: x / Appearance: x / SG: x / pH: x  Gluc: 65 mg/dL / Ketone: x  / Bili: x / Urobili: x   Blood: x / Protein: x / Nitrite: x   Leuk Esterase: x / RBC: x / WBC x   Sq Epi: x / Non Sq Epi: x / Bacteria: x          IMAGING

## 2024-11-21 NOTE — PATIENT PROFILE ADULT - FALL HARM RISK - HARM RISK INTERVENTIONS

## 2024-11-21 NOTE — CONSULT NOTE ADULT - ATTENDING COMMENTS
49 y o M with polysubstance abuse chronic backache AUD readmitted with abdominal distension and jaundice.      Was admitted in  with jaundice and ascites. Had leg swelling as well.   Drinks 1 L alcohol daily. Reports stopping for few months and mother  2 months ago and resumed alcohol intake.   Single. Not working now.       Icteric  Abdomen distended non tender hepatomegaly+ flanks dull  Ext mild leg edema, ulcer L leg  Tattoos+  No asterixis. Mild tremors     Severe alcoholic hepatitis  MDF 44  Cirrhosis decompensated with ascites and HE  MELD 3.0 - 24  Child C   (Radiology report states steatosis but liver contour appears irregular with prominent caudate lobe)  Macrocytic anemia  Subclinical hypothyroidism    Will need to consider prednisone 40 mg if no contraindications.   Continue lactulose for 3 BM per day  Paracentesis and fluid studies. Diuretics after paracentesis  SBP primary prophylaxis with ciprofloxacin with low protein  Alcohol abstinence and rehab  Echo   Would get MRI abdomen  Plan for EGD early next week as not compliant.  Monitor liver tests INR  LT evaluation - MELD 3.0 - 27. Vague about alcohol and social situation.   Was drinking till admission. Single. Reports brother in [place]. Limited social support.  Has improved and can have OP follow up and evaluation.  2 g Na 1.5 g/kg protein diet  Early breakfast late night snack multiple small meals.

## 2024-11-21 NOTE — CONSULT NOTE ADULT - ASSESSMENT
Case of a 48 year old male patient with chronic back pain, history of opiate use on methadone, ethanol abuse and hepatic steatosis who presented to the ED on 06/19 for worsening abdominal distention and pain in setting of recent jaundice and dark urine, found to have elevated LFTs and concern for cirrhosis and worsened ascites. We are consulted for concern of alcohol hepatitis and cirrhosis.    Severe Alcoholic hepatitis with Jaundice- MDF >32  Suspected Decompensated MAHOGANY Cirrhosis in Setting of Radiological and Clinical Evidence (CHILD C)  Portal HTN with Moderate Ascites s/p Tap on 06/20/2024    * Hemodynamically stable  * exam with abdominal distention   * RUQ US with moderate ascites   * s/p paracentesis on 06/30/2024: 3.2 L, SAAG 1.2, Tptn<1, no SBP     RECOMMENDATIONS  - Trend LFTs and INR  - For ascites , get diagnostic and therapeutic paracentesis   - Monitor BM and avoid constipation. Continue lactulose 20 g Q8h  - Recommend alcohol cessation. Continue multivitamins and folic acid supplements  - Continue CIWA protocol. On PO Ativan PRN  - Outpatient follow up for EGD for EV surveillance in setting of suspected cirrhosis  - Outpatient follow up for HCC screening: US and AFP Q6 months  - Recommend low salt high protein diet  - LT evaluation: has brothers as social support; not marries; lives alone; no kids  - Follow up with our [facility] located at [address] [place] [address], [id number]. Telephone No: [phone]

## 2024-11-21 NOTE — PROGRESS NOTE ADULT - ASSESSMENT
A 48-year-old male patient with a history of chronic back pain and chronic lower extremity swelling presented to the emergency department today complaining of worsening abdominal pain for the past several days. He has a history of opiate and heroin abuse (non-IV drug use) and is currently prescribed methadone 40mg daily through [facility] ([phone]). He also has a history of alcohol abuse beginning in September 2023. While he reports a period of sobriety, he resumed drinking two months ago, consuming approximately two pints of vodka daily, with his last drink this morning. He denies any other recent drug use.    The patient was previously admitted to the ED on June 19th for sepsis secondary to colitis and was discharged on June 26th after receiving antibiotic treatment. Since his discharge, his abdominal pain and distension have progressively worsened, accompanied by nausea, decreased appetite, and vomiting bile at least twice daily.       #Pancolitis :   -SIRS negative ,elevated WBC, no tachycardia, tachypnea, fever  -WBCs 11.77 e Neutrophil predom   -On CT A/P : Diffuse colonic mural thickening, which may represent pancolitis in the   appropriate clinical setting.   -rcvd cipro and flagyl in ed   PLAN :   -pain control   -advance Diet as tolerated   -c/w cirpo and flagyl   -f/u infectious workup , ESR-8  , CRP -18  -f/u repeat CBC to trend WBC  -can consider GI c/s for possible colonoscopy or outpt f/u       #EtOH abuse   #Cirrhosis 2/2 Alcoholic Hepatitis  #Jaundice   -As per patient been drinking alcohol since 2023 and the last drink was this morning but denied any other recent drug use .   -Denies any tremors, insomnia , seizures   PLAN :   -f/u alcohol level  -f/u toxicology screen   - [facility] protocol   - Addiction and CATCH consulted   - Continue thiamine, folate, B12, multivitamin; takes at home.  - f/u ammonia   - f/u hepatitis panel, EBV, CMV  - f/u Lipids, A1c  -monitor LFTs , bilirubin level - direct 15.4, total 20.2  -MELD score 22  -, ,    - Hepatology consulted   -inr daily  -US abdomen showed diffuse ascites, will para  -dc'd libriumed as it is hepatically metabolized, started on ativan per [facility]  -ekg for qtc    #Low PLT 2/2 to alcohol abuse :   -  -trend CBC for PLT   -monitor for any sign of bleed  -active Tns   -fu iron studies      #BLE swelling   -L>R with 2+ pitting edema .   -were on lasix 40 PO daily , but  pt is noncomplaint   -resumed lasix 40 PO daily      #chronic back pain  #h/o opiate and heroin abuse w/o IVDU  Denies any current use   - Call [facility] [phone] to confirm   -on Methadone 40 mg daily     Provider handoff: fu hep consult, ekg for qtc, and hep panel

## 2024-11-22 LAB
ALBUMIN SERPL ELPH-MCNC: 2.7 G/DL — LOW (ref 3.5–5.2)
ALP SERPL-CCNC: 467 U/L — HIGH (ref 30–115)
ALT FLD-CCNC: 110 U/L — HIGH (ref 0–41)
ANION GAP SERPL CALC-SCNC: 12 MMOL/L — SIGNIFICANT CHANGE UP (ref 7–14)
APTT BLD: 33.5 SEC — SIGNIFICANT CHANGE UP (ref 27–39.2)
AST SERPL-CCNC: 406 U/L — HIGH (ref 0–41)
BASOPHILS # BLD AUTO: 0.05 K/UL — SIGNIFICANT CHANGE UP (ref 0–0.2)
BASOPHILS NFR BLD AUTO: 0.6 % — SIGNIFICANT CHANGE UP (ref 0–1)
BILIRUB SERPL-MCNC: 20.8 MG/DL — CRITICAL HIGH (ref 0.2–1.2)
BUN SERPL-MCNC: 8 MG/DL — LOW (ref 10–20)
CALCIUM SERPL-MCNC: 7.6 MG/DL — LOW (ref 8.4–10.4)
CHLORIDE SERPL-SCNC: 102 MMOL/L — SIGNIFICANT CHANGE UP (ref 98–110)
CMV DNA CSF QL NAA+PROBE: SIGNIFICANT CHANGE UP IU/ML
CMV DNA SPEC NAA+PROBE-LOG#: SIGNIFICANT CHANGE UP LOG10IU/ML
CO2 SERPL-SCNC: 26 MMOL/L — SIGNIFICANT CHANGE UP (ref 17–32)
CREAT SERPL-MCNC: 0.8 MG/DL — SIGNIFICANT CHANGE UP (ref 0.7–1.5)
EGFR: 108 ML/MIN/1.73M2 — SIGNIFICANT CHANGE UP
EOSINOPHIL # BLD AUTO: 0.14 K/UL — SIGNIFICANT CHANGE UP (ref 0–0.7)
EOSINOPHIL NFR BLD AUTO: 1.6 % — SIGNIFICANT CHANGE UP (ref 0–8)
FERRITIN SERPL-MCNC: 2647 NG/ML — HIGH (ref 30–400)
GLUCOSE SERPL-MCNC: 64 MG/DL — LOW (ref 70–99)
HAV IGM SER-ACNC: SIGNIFICANT CHANGE UP
HBV CORE IGM SER-ACNC: SIGNIFICANT CHANGE UP
HBV CORE IGM SER-ACNC: SIGNIFICANT CHANGE UP
HBV SURFACE AG SER-ACNC: SIGNIFICANT CHANGE UP
HCT VFR BLD CALC: 27.7 % — LOW (ref 42–52)
HCV AB S/CO SERPL IA: 0.17 S/CO — SIGNIFICANT CHANGE UP (ref 0–0.99)
HCV AB SERPL-IMP: SIGNIFICANT CHANGE UP
HGB BLD-MCNC: 9.6 G/DL — LOW (ref 14–18)
IMM GRANULOCYTES NFR BLD AUTO: 0.5 % — HIGH (ref 0.1–0.3)
INR BLD: 1.55 RATIO — HIGH (ref 0.65–1.3)
LYMPHOCYTES # BLD AUTO: 1.13 K/UL — LOW (ref 1.2–3.4)
LYMPHOCYTES # BLD AUTO: 12.8 % — LOW (ref 20.5–51.1)
MAGNESIUM SERPL-MCNC: 1.6 MG/DL — LOW (ref 1.8–2.4)
MCHC RBC-ENTMCNC: 34.7 G/DL — SIGNIFICANT CHANGE UP (ref 32–37)
MCHC RBC-ENTMCNC: 39.3 PG — HIGH (ref 27–31)
MCV RBC AUTO: 113.5 FL — HIGH (ref 80–94)
MONOCYTES # BLD AUTO: 0.43 K/UL — SIGNIFICANT CHANGE UP (ref 0.1–0.6)
MONOCYTES NFR BLD AUTO: 4.9 % — SIGNIFICANT CHANGE UP (ref 1.7–9.3)
NEUTROPHILS # BLD AUTO: 7.06 K/UL — HIGH (ref 1.4–6.5)
NEUTROPHILS NFR BLD AUTO: 79.6 % — HIGH (ref 42.2–75.2)
NRBC # BLD: 0 /100 WBCS — SIGNIFICANT CHANGE UP (ref 0–0)
PHOSPHATE SERPL-MCNC: 2.3 MG/DL — SIGNIFICANT CHANGE UP (ref 2.1–4.9)
PLATELET # BLD AUTO: 83 K/UL — LOW (ref 130–400)
PMV BLD: 11.4 FL — HIGH (ref 7.4–10.4)
POTASSIUM SERPL-MCNC: 3.3 MMOL/L — LOW (ref 3.5–5)
POTASSIUM SERPL-SCNC: 3.3 MMOL/L — LOW (ref 3.5–5)
PROT SERPL-MCNC: 5.4 G/DL — LOW (ref 6–8)
PROTHROM AB SERPL-ACNC: 18.4 SEC — HIGH (ref 9.95–12.87)
RBC # BLD: 2.44 M/UL — LOW (ref 4.7–6.1)
RBC # FLD: 18.7 % — HIGH (ref 11.5–14.5)
SODIUM SERPL-SCNC: 140 MMOL/L — SIGNIFICANT CHANGE UP (ref 135–146)
WBC # BLD: 8.85 K/UL — SIGNIFICANT CHANGE UP (ref 4.8–10.8)
WBC # FLD AUTO: 8.85 K/UL — SIGNIFICANT CHANGE UP (ref 4.8–10.8)

## 2024-11-22 PROCEDURE — 99233 SBSQ HOSP IP/OBS HIGH 50: CPT

## 2024-11-22 PROCEDURE — 99232 SBSQ HOSP IP/OBS MODERATE 35: CPT

## 2024-11-22 RX ORDER — LACTULOSE 10 G/15ML
10 SOLUTION ORAL DAILY
Refills: 0 | Status: DISCONTINUED | OUTPATIENT
Start: 2024-11-22 | End: 2024-11-27

## 2024-11-22 RX ORDER — POTASSIUM CHLORIDE 600 MG/1
20 TABLET, EXTENDED RELEASE ORAL ONCE
Refills: 0 | Status: COMPLETED | OUTPATIENT
Start: 2024-11-22 | End: 2024-11-22

## 2024-11-22 RX ADMIN — METHADONE HYDROCHLORIDE 40 MILLIGRAM(S): 5 TABLET ORAL at 11:36

## 2024-11-22 RX ADMIN — Medication 25 GRAM(S): at 08:25

## 2024-11-22 RX ADMIN — LORAZEPAM 1 MILLIGRAM(S): 2 TABLET ORAL at 06:06

## 2024-11-22 RX ADMIN — LACTULOSE 10 GRAM(S): 10 SOLUTION ORAL at 17:58

## 2024-11-22 RX ADMIN — Medication 1 MILLIGRAM(S): at 11:36

## 2024-11-22 RX ADMIN — POTASSIUM CHLORIDE 20 MILLIEQUIVALENT(S): 600 TABLET, EXTENDED RELEASE ORAL at 11:36

## 2024-11-22 RX ADMIN — Medication 25 GRAM(S): at 11:12

## 2024-11-22 RX ADMIN — PANTOPRAZOLE SODIUM 40 MILLIGRAM(S): 40 TABLET, DELAYED RELEASE ORAL at 05:54

## 2024-11-22 RX ADMIN — ENOXAPARIN SODIUM 40 MILLIGRAM(S): 30 INJECTION SUBCUTANEOUS at 21:09

## 2024-11-22 RX ADMIN — Medication 100 MILLIGRAM(S): at 12:20

## 2024-11-22 RX ADMIN — FUROSEMIDE 40 MILLIGRAM(S): 40 TABLET ORAL at 05:54

## 2024-11-22 NOTE — PROGRESS NOTE ADULT - SUBJECTIVE AND OBJECTIVE BOX
Gastroenterology progress note:     Patient is a 49y old  Male who presents with a chief complaint of abdominal pain (22 Nov 2024 05:41)       Admitted on: 11-20-24    We are following the patient for: alcoholic hepatitis        Interval History:    No acute events overnight.          PAST MEDICAL & SURGICAL HISTORY:  Cirrhosis      Opiate abuse, episodic      Moderate heroin dependence in sustained remission          MEDICATIONS  (STANDING):  enoxaparin Injectable 40 milliGRAM(s) SubCutaneous every 24 hours  folic acid 1 milliGRAM(s) Oral daily  furosemide    Tablet 40 milliGRAM(s) Oral daily  lactulose Syrup 10 Gram(s) Oral daily  methadone    Tablet 40 milliGRAM(s) Oral daily  pantoprazole    Tablet 40 milliGRAM(s) Oral before breakfast  thiamine Injectable 100 milliGRAM(s) IV Push daily    MEDICATIONS  (PRN):  LORazepam   Injectable 1 milliGRAM(s) IV Push every 1 hour PRN CIWA-Ar score 8 or greater      Allergies  No Known Allergies      Review of Systems:   Cardiovascular:  No Chest Pain, No Palpitations  Respiratory:  No Cough, No Dyspnea  Gastrointestinal:  As described in HPI  Skin:  No Skin Lesions, No Jaundice  Neuro:  No Syncope, No Dizziness    Physical Examination:  T(C): 36.9 (11-22-24 @ 13:00), Max: 36.9 (11-22-24 @ 05:02)  HR: 72 (11-22-24 @ 13:00) (72 - 99)  BP: 101/63 (11-22-24 @ 13:00) (101/63 - 130/86)  RR: 18 (11-22-24 @ 13:00) (18 - 18)  SpO2: 96% (11-22-24 @ 13:00) (96% - 98%)        GENERAL: AAOx3, no acute distress.  HEAD:  Atraumatic, Normocephalic  EYES: conjunctiva and sclera clear  NECK: Supple, no JVD or thyromegaly  CHEST/LUNG: Clear to auscultation bilaterally; No wheeze, rhonchi, or rales  HEART: Regular rate and rhythm; normal S1, S2, No murmurs.  ABDOMEN: Soft, nontender, nondistended; Bowel sounds present  NEUROLOGY: No asterixis or tremor.   SKIN: Intact, + jaundice     Data:                        9.6    8.85  )-----------( 83       ( 22 Nov 2024 06:19 )             27.7     Hgb trend:  9.6  11-22-24 @ 06:19  9.9  11-21-24 @ 07:08  10.9  11-20-24 @ 19:01      11-22    140  |  102  |  8[L]  ----------------------------<  64[L]  3.3[L]   |  26  |  0.8    Ca    7.6[L]      22 Nov 2024 06:19  Phos  2.3     11-22  Mg     1.6     11-22    TPro  5.4[L]  /  Alb  2.7[L]  /  TBili  20.8[HH]  /  DBili  x   /  AST  406[H]  /  ALT  110[H]  /  AlkPhos  467[H]  11-22    Liver panel trend:  TBili 20.8   /      /      /   AlkP 467   /   Tptn 5.4   /   Alb 2.7    /   DBili --      11-22  TBili 22.9   /      /      /   AlkP 524   /   Tptn 5.9   /   Alb 3.0    /   DBili >17.2      11-21  TBili 21.9   /      /      /   AlkP 509   /   Tptn 5.8   /   Alb 2.9    /   DBili 16.2      11-21  TBili 20.2   /      /      /   AlkP 491   /   Tptn 5.6   /   Alb 2.9    /   DBili 15.4      11-21  TBili 22.9   /      /      /   AlkP 565   /   Tptn 6.2   /   Alb 3.2    /   DBili 16.7      11-20      PT/INR - ( 22 Nov 2024 06:19 )   PT: 18.40 sec;   INR: 1.55 ratio         PTT - ( 22 Nov 2024 06:19 )  PTT:33.5 sec    Culture - Blood (collected 21 Nov 2024 07:08)  Source: .Blood BLOOD  Preliminary Report (22 Nov 2024 14:02):    No growth at 24 hours         Radiology:    US Abdomen Complete:   ACC: [id number] EXAM:  US ABDOMEN COMPLETE   ORDERED BY: CAREN PUGA     PROCEDURE DATE:  11/21/2024          INTERPRETATION:  CLINICAL INFORMATION: Distended abdomen. Post biliary   obstruction.    COMPARISON: CT scan of the abdomen and pelvis done11 hours prior.    TECHNIQUE: Sonography of the abdomen.    FINDINGS:  Liver: Please see CT scan.  Bile ducts: Normal caliber. Common bile duct measures 5 mm.  Gallbladder: Biliary sludge. Hydrops of the gallbladder, measuring 15.3 x   5.1 cm. Gallbladder wall normal in thickness measuring 2 mm. No   pericholecystic fluid. Negative sonographic Garcia sign.  Ascites: Moderate.    IMPRESSION:  Sludge within a nonthick walled distended gallbladder as seen on CT scan.    Moderate ascites.    --- End of Report ---          MAYA ENGLAND MD; Resident Radiologist  This document has been electronically signed.  NARGIS APODACA MD; Attending Interventional Radiologist  This document has been electronically signed. Nov 21 2024  9:37AM (11-21-24 @ 08:53)

## 2024-11-22 NOTE — PROGRESS NOTE ADULT - ASSESSMENT
A 48-year-old male patient with a history of chronic back pain, opiate/heroin use on methadone, alcohol use disorder, cirrhosis  and chronic lower extremity swelling presented to the emergency department today complaining of worsening abdominal pain for the past several days. While he reports a period of sobriety, he resumed drinking two months ago, consuming approximately two pints of vodka daily, with his last drink this morning. He denies any other recent drug use.    # Decompensated liver cirrhosis  due to Etoh use disorder     Suspected thiamine deficiency   -CT A/p: hepatic steatosis , gallbladder distenstion  -US abdomen showed diffuse ascites,   -LABS: T Bili 22.9, Lipase 107, -MELD score 22 -, ,   -Paracentesis with procedure team 11/21: unsucessful  -Consulted IR 11/21 for paracentesis diagnositic and theurpeatutic, issue with consult was not processed until 11/22, IR placed pt on schedule for monday 11/25  - Ativan prn  - CIWA protocol   - f/u  CATCH consulted   - Continue thiamine, folate, B12, multivitamin; takes at home.  - f/u hepatitis panel, EBV, CMV  -monitor LFTs , bilirubin level - direct 15.4, total 20.2  - Hepatology consulted : awaiting paracentesis to start prednisone as MDF > 32, will hold on abx per ID   -started on lactulose , 2 BM daily, continue to monitor   -inr daily    # Acute Pancolitis   -SIRS negative   -CT A/P : Diffuse colonic mural thickening, which may represent pancolitis.  -advance Diet as tolerated   - d/c Cipro and Flagyl   -can consider GI c/s for possible colonoscopy or outpt f/u     # Thrombocytopenia  likely due to liver cirrhosis   -  -trend CBC for PLT   -monitor for any sign of bleed  -active Type and Screen  -fu iron studies    # LE edema/ascites due to liver cirrhosis and portal HTN   -L>R with 2+ pitting edema .   -was on lasix 40 PO daily , but  pt is noncompliant   -resumed lasix 40 PO daily    #chronic back pain  #h/o opiate and heroin abuse w/o IVDU  Denies any current use   - Call [facility] [phone] to confirm   -on Methadone 40 mg daily     DVT ppx: Lovenox (hold 11/25 AM dose)  GI ppx: PPI BID     Pending: Paracentesis/ CIWA  Dispo: TBD

## 2024-11-22 NOTE — PROGRESS NOTE ADULT - SUBJECTIVE AND OBJECTIVE BOX
DYLAN VICENTE  49y  Male      Patient is a 49y old  Male who presents with a chief complaint of abdominal pain     INTERVAL HPI/OVERNIGHT EVENTS:      ******************************* REVIEW OF SYSTEMS:**********************************************    All other review of systems negative    *********************** VITALS ******************************************    T(F): 98.4 (11-22-24 @ 13:00)  HR: 72 (11-22-24 @ 13:00) (72 - 99)  BP: 101/63 (11-22-24 @ 13:00) (101/63 - 130/86)  RR: 18 (11-22-24 @ 13:00) (18 - 18)  SpO2: 96% (11-22-24 @ 13:00) (96% - 98%)            ******************************** PHYSICAL EXAM:**************************************************  GENERAL: NAD    PSYCH: no agitation, baseline mentation  HEENT: icterus     NERVOUS SYSTEM:  Alert & Oriented X3,     PULMONARY: XIMENA, CTA    CARDIOVASCULAR: S1S2 RRR    GI: Soft, NT, distended; BS present.    EXTREMITIES:  2+ Peripheral Pulses, No clubbing, cyanosis, ankle edema     LYMPH: No lymphadenopathy noted    SKIN: No rashes or lesions      **************************** LABS *******************************************************                          9.6    8.85  )-----------( 83       ( 22 Nov 2024 06:19 )             27.7     11-22    140  |  102  |  8[L]  ----------------------------<  64[L]  3.3[L]   |  26  |  0.8    Ca    7.6[L]      22 Nov 2024 06:19  Phos  2.3     11-22  Mg     1.6     11-22    TPro  5.4[L]  /  Alb  2.7[L]  /  TBili  20.8[HH]  /  DBili  x   /  AST  406[H]  /  ALT  110[H]  /  AlkPhos  467[H]  11-22      Urinalysis Basic - ( 22 Nov 2024 06:19 )    Color: x / Appearance: x / SG: x / pH: x  Gluc: 64 mg/dL / Ketone: x  / Bili: x / Urobili: x   Blood: x / Protein: x / Nitrite: x   Leuk Esterase: x / RBC: x / WBC x   Sq Epi: x / Non Sq Epi: x / Bacteria: x      PT/INR - ( 22 Nov 2024 06:19 )   PT: 18.40 sec;   INR: 1.55 ratio         PTT - ( 22 Nov 2024 06:19 )  PTT:33.5 sec  Lactate Trend  11-21 @ 11:15 Lactate:3.4   11-21 @ 07:08 Lactate:2.6   11-21 @ 04:40 Lactate:2.8   11-21 @ 00:34 Lactate:2.8         CAPILLARY BLOOD GLUCOSE              **************************Active Medications *******************************************  No Known Allergies      enoxaparin Injectable 40 milliGRAM(s) SubCutaneous every 24 hours  folic acid 1 milliGRAM(s) Oral daily  furosemide    Tablet 40 milliGRAM(s) Oral daily  lactulose Syrup 10 Gram(s) Oral daily  LORazepam   Injectable 1 milliGRAM(s) IV Push every 1 hour PRN  methadone    Tablet 40 milliGRAM(s) Oral daily  pantoprazole    Tablet 40 milliGRAM(s) Oral before breakfast  thiamine Injectable 100 milliGRAM(s) IV Push daily      ***************************************************  RADIOLOGY & ADDITIONAL TESTS:    Imaging Personally Reviewed:  [ ] YES  [ ] NO    HEALTH ISSUES - PROBLEM Dx:

## 2024-11-22 NOTE — CONSULT NOTE ADULT - ASSESSMENT
48-year-old male patient with a history of chronic back pain and chronic lower extremity swelling presented to the emergency department today complaining of worsening abdominal pain for the past several days. He has a history of opiate and heroin abuse (non-IV drug use) and is currently prescribed methadone 40mg daily through [facility] ([phone]). He also has a history of alcohol abuse beginning in September 2023. While he reports a period of sobriety, he resumed drinking two months ago, consuming approximately two pints of vodka daily, with his last drink this morning. He denies any other recent drug use.    The patient was previously admitted to the ED on June 19th for sepsis secondary to colitis and was discharged on June 26th after receiving antibiotic treatment. Since his discharge, his abdominal pain and distension have progressively worsened, accompanied by nausea, decreased appetite, and vomiting bile at least twice daily. He denies experiencing tremors, insomnia, seizures, diarrhea, constipation, cough, sore throat, fevers, or chills. He also reports the onset of jaundice in early 2024 and was recently diagnosed with cirrhosis.        On CT A/P : Diffuse colonic mural thickening, which may represent pancolitis in the   appropriate clinical setting. No abscess.  Marked hepatic steatosis., Nonspecific gallbladder distention.    IMPRESSION/RECOMMENDATIONS  Immunosuppression/liver cirrhosis (decline in immunity which could result in poor clinical outcomes.    Opiate and heroin abuse (non-IV drug use) and is currently prescribed methadone 40mg daily  Alcohol abuse  48-year-old male patient with a history of chronic back pain and chronic lower extremity swelling presented to the emergency department today complaining of worsening abdominal pain for the past several days. He has a history of opiate and heroin abuse (non-IV drug use) and is currently prescribed methadone 40mg daily through [facility] ([phone]). He also has a history of alcohol abuse beginning in September 2023. While he reports a period of sobriety, he resumed drinking two months ago, consuming approximately two pints of vodka daily, with his last drink this morning. He denies any other recent drug use.    11/20 CT A/P : Diffuse colonic mural thickening, which may represent pancolitis in the   appropriate clinical setting. No abscess.  Marked hepatic steatosis., Nonspecific gallbladder distention.    IMPRESSION/RECOMMENDATIONS  Immunosuppression/liver cirrhosis (decline in immunity which could result in poor clinical outcomes.  Acute illness ( acute alcoholic hepatitis )  which poses a threat to life without treatment   Severe Alcoholic hepatitis with Jaundice  with Tbili 20.8 with MDF >32  Decompensated Cirrhosis   Clinically no SBP  Portal HTN with Moderate Ascites s/p Tap on 06/20/2024 6/30 s/p paracentesis : no SBP  Pancolitis : clinically no diarrhea  No evidence of infectious colitis  11/20 CT pancolitis. ( Independent interpretation of test : not infectious colitis   CBC :  ( WBC 8.8  ), ( Hg 9.6   ), CMP ( Cr 0.8   ) reviewed .     Opiate and heroin abuse (non-IV drug use) and is currently prescribed methadone 40mg daily  Alcohol abuse     -Off loading to prevent pressure sores and preventive measures to avoid aspiration  -FU with GI for pancolitis/ possible EGD/HCC screening/dietary restrictions  -paracentesis  -no Abx for now    Discussion of management/test results/antibiotic regimen  with primary team.

## 2024-11-22 NOTE — CONSULT NOTE ADULT - NS ATTEST RISK PROBLEM GEN_ALL_CORE FT
-My assessment required an independent historian and is independent of the teaching service  -I independently interpreted the most recent imaging ( CXR ) and labs ( CBC, CMP) and cultures ( along with the sensitivities / JULIA )  -I discussed my recommendations with the primary team housestaff/Attending  -I assisted with initiation of antibiotics  -Pt is on ABx therapy requiring intensive monitoring for toxicity

## 2024-11-22 NOTE — PROGRESS NOTE ADULT - SUBJECTIVE AND OBJECTIVE BOX
SUBJECTIVE/OVERNIGHT EVENTS  Today is hospital day 2d. This morning patient was seen and examined at bedside, resting comfortably in bed. Patient has tremor on exam, required ativan in AM. Not able to eat due to nausea.         MEDICATIONS  STANDING MEDICATIONS  enoxaparin Injectable 40 milliGRAM(s) SubCutaneous every 24 hours  folic acid 1 milliGRAM(s) Oral daily  furosemide    Tablet 40 milliGRAM(s) Oral daily  lactulose Syrup 10 Gram(s) Oral daily  methadone    Tablet 40 milliGRAM(s) Oral daily  pantoprazole    Tablet 40 milliGRAM(s) Oral before breakfast  thiamine Injectable 100 milliGRAM(s) IV Push daily    PRN MEDICATIONS  LORazepam   Injectable 1 milliGRAM(s) IV Push every 1 hour PRN    VITALS  T(F): 98.4 (11-22-24 @ 13:00), Max: 98.5 (11-22-24 @ 05:02)  HR: 72 (11-22-24 @ 13:00) (72 - 99)  BP: 101/63 (11-22-24 @ 13:00) (101/63 - 130/86)  RR: 18 (11-22-24 @ 13:00) (18 - 18)  SpO2: 96% (11-22-24 @ 13:00) (96% - 98%)      PHYSICAL EXAM:  GENERAL: NAD, well-groomed, well-developed  HEAD:  Atraumatic, Normocephalic  EYES: EOMI, PERRLA, jaundice  ENMT:  Moist mucous membranes  NECK: Supple, No JVD,  CHEST/LUNG: Clear to auscultation bilaterally  HEART: Regular rate and rhythm  ABDOMEN: Soft, Nontender, Distended  NEURO:  MENTAL STATUS: AAOx3  LANG/SPEECH: Fluent  CRANIAL NERVES:  II: Pupils equal and reactive, no RAPD, normal visual field and fundus  III, IV, VI: EOM intact, no gaze preference or deviation  V: normal  VII: no facial asymmetry  VIII: normal hearing to speech  MOTOR: 5/5 in both upper and lower extremities  REFLEXES:  SENSORY: Normal to touch, temperature & pin prick in all extremiteis  COORD: tremor ue  Gait:   EXTREMITIES: LE edema 2+, no calf tenderness  LYMPH: No lymphadenopathy noted  SKIN: No rashes or lesions         LABS             9.6    8.85  )-----------( 83       ( 11-22-24 @ 06:19 )             27.7     140  |  102  |  8   -------------------------<  64   11-22-24 @ 06:19  3.3  |  26  |  0.8    Ca      7.6     11-22-24 @ 06:19  Phos   2.3     11-22-24 @ 06:19  Mg     1.6     11-22-24 @ 06:19    TPro  5.4  /  Alb  2.7  /  TBili  20.8  /  DBili  x   /  AST  406  /  ALT  110  /  AlkPhos  467  /  GGT  x     11-22-24 @ 06:19    PT/INR - ( 11-22-24 @ 06:19 )   PT: 18.40 sec[H];   INR: 1.55 ratio[H]  PTT - ( 11-22-24 @ 06:19 )  PTT:33.5 sec      Urinalysis Basic - ( 22 Nov 2024 06:19 )    Color: x / Appearance: x / SG: x / pH: x  Gluc: 64 mg/dL / Ketone: x  / Bili: x / Urobili: x   Blood: x / Protein: x / Nitrite: x   Leuk Esterase: x / RBC: x / WBC x   Sq Epi: x / Non Sq Epi: x / Bacteria: x          Culture - Blood (collected 21 Nov 2024 07:08)  Source: .Blood BLOOD  Preliminary Report (22 Nov 2024 14:02):    No growth at 24 hours      IMAGING      ASSESSMENT AND PLAN:    A 48-year-old male patient with a history of chronic back pain, opiate/heroin use on methadone, alcohol use disorder, cirrhosis  and chronic lower extremity swelling presented to the emergency department today complaining of worsening abdominal pain for the past several days. While he reports a period of sobriety, he resumed drinking two months ago, consuming approximately two pints of vodka daily, with his last drink this morning. He denies any other recent drug use.        #Pancolitis on CT  -SIRS negative ,elevated WBC, no tachycardia, tachypnea, fever  -WBCs 11.77 Neutrophil predom   -CT A/P : Diffuse colonic mural thickening, which may represent pancolitis in the   appropriate clinical setting.   PLAN :   -pain control   -advance Diet as tolerated   -Hepatology: paracentesis, then consider prednisone due to MDF > 32  -c/w cirpo and flagyl   -can consider GI c/s for possible colonoscopy or outpt f/u       #EtOH abuse   #Cirrhosis 2/2 Alcoholic Hepatitis  #Jaundice   -As per patient been drinking alcohol since 2023 and the last drink was this morning but denied any other recent drug use .   -Denies any tremors, insomnia , seizures   PLAN :   -Ativan prn  - [facility] protocol   - Addiction and CATCH consulted   - Continue thiamine, folate, B12, multivitamin; takes at home.  - f/u ammonia   - f/u hepatitis panel, EBV, CMV  - f/u Lipids, A1c  -monitor LFTs , bilirubin level - direct 15.4, total 20.2  -MELD score 22  -, ,    - Hepatology consulted   -inr daily  -US abdomen showed diffuse ascites, will para  -dc'd libriumed as it is hepatically metabolized, started on ativan per ciwa  -ekg for qtc    #Low PLT 2/2 to alcohol abuse :   -  -trend CBC for PLT   -monitor for any sign of bleed  -active Tns   -fu iron studies      #BLE swelling   -L>R with 2+ pitting edema .   -were on lasix 40 PO daily , but  pt is noncomplaint   -resumed lasix 40 PO daily      #chronic back pain  #h/o opiate and heroin abuse w/o IVDU  Denies any current use   - Call [facility] [phone] to confirm   -on Methadone 40 mg daily     Provider handoff: fu hep consult, ekg for qtc, and hep panel     SUBJECTIVE/OVERNIGHT EVENTS  Today is hospital day 2d. This morning patient was seen and examined at bedside, resting comfortably in bed. Patient has tremor on exam, required ativan in AM. Not able to eat due to nausea.       MEDICATIONS  STANDING MEDICATIONS  enoxaparin Injectable 40 milliGRAM(s) SubCutaneous every 24 hours  folic acid 1 milliGRAM(s) Oral daily  furosemide    Tablet 40 milliGRAM(s) Oral daily  lactulose Syrup 10 Gram(s) Oral daily  methadone    Tablet 40 milliGRAM(s) Oral daily  pantoprazole    Tablet 40 milliGRAM(s) Oral before breakfast  thiamine Injectable 100 milliGRAM(s) IV Push daily    PRN MEDICATIONS  LORazepam   Injectable 1 milliGRAM(s) IV Push every 1 hour PRN    VITALS  T(F): 98.4 (11-22-24 @ 13:00), Max: 98.5 (11-22-24 @ 05:02)  HR: 72 (11-22-24 @ 13:00) (72 - 99)  BP: 101/63 (11-22-24 @ 13:00) (101/63 - 130/86)  RR: 18 (11-22-24 @ 13:00) (18 - 18)  SpO2: 96% (11-22-24 @ 13:00) (96% - 98%)      PHYSICAL EXAM:  GENERAL: NAD, well-groomed, well-developed  HEAD:  Atraumatic, Normocephalic  EYES: EOMI, PERRLA, jaundice  ENMT:  Moist mucous membranes  NECK: Supple, No JVD,  CHEST/LUNG: Clear to auscultation bilaterally  HEART: Regular rate and rhythm  ABDOMEN: Soft, Nontender, Distended  NEURO:  MENTAL STATUS: AAOx3  LANG/SPEECH: Fluent  CRANIAL NERVES:  II: Pupils equal and reactive, no RAPD, normal visual field and fundus  III, IV, VI: EOM intact, no gaze preference or deviation  V: normal  VII: no facial asymmetry  VIII: normal hearing to speech  MOTOR: 5/5 in both upper and lower extremities  REFLEXES:  SENSORY: Normal to touch, temperature & pin prick in all extremiteis  COORD: tremor ue  Gait:   EXTREMITIES: LE edema 2+, no calf tenderness  LYMPH: No lymphadenopathy noted  SKIN: No rashes or lesions         LABS             9.6    8.85  )-----------( 83       ( 11-22-24 @ 06:19 )             27.7     140  |  102  |  8   -------------------------<  64   11-22-24 @ 06:19  3.3  |  26  |  0.8    Ca      7.6     11-22-24 @ 06:19  Phos   2.3     11-22-24 @ 06:19  Mg     1.6     11-22-24 @ 06:19    TPro  5.4  /  Alb  2.7  /  TBili  20.8  /  DBili  x   /  AST  406  /  ALT  110  /  AlkPhos  467  /  GGT  x     11-22-24 @ 06:19    PT/INR - ( 11-22-24 @ 06:19 )   PT: 18.40 sec[H];   INR: 1.55 ratio[H]  PTT - ( 11-22-24 @ 06:19 )  PTT:33.5 sec      Urinalysis Basic - ( 22 Nov 2024 06:19 )    Color: x / Appearance: x / SG: x / pH: x  Gluc: 64 mg/dL / Ketone: x  / Bili: x / Urobili: x   Blood: x / Protein: x / Nitrite: x   Leuk Esterase: x / RBC: x / WBC x   Sq Epi: x / Non Sq Epi: x / Bacteria: x          Culture - Blood (collected 21 Nov 2024 07:08)  Source: .Blood BLOOD  Preliminary Report (22 Nov 2024 14:02):    No growth at 24 hours      IMAGING      ASSESSMENT AND PLAN:    A 48-year-old male patient with a history of chronic back pain, opiate/heroin use on methadone, alcohol use disorder, cirrhosis  and chronic lower extremity swelling presented to the emergency department today complaining of worsening abdominal pain for the past several days. While he reports a period of sobriety, he resumed drinking two months ago, consuming approximately two pints of vodka daily, with his last drink this morning. He denies any other recent drug use.          #Cirrhosis 2/2 Alcoholic Hepatitis  #EtOH abuse   #Jaundice   -CT A/p: hepatic steatosis , gallbladder distenstion  -US abdomen showed diffuse ascites,   -LABS: T Bili 22.9, Lipase 107, -MELD score 22 -, ,   -Paracentesis with procedure team 11/21: unsucessful  -Consulted IR 11/21 for paracentesis diagnositic and theurpeatutic, issue with consult was not processed until 11/22, IR placed pt on schedule for monday 11/25  PLAN :   - Ativan prn  - CIWA protocol   - Addiction and CATCH consulted   - Continue thiamine, folate, B12, multivitamin; takes at home.  - f/u ammonia   - f/u hepatitis panel, EBV, CMV  - f/u Lipids, A1c  -monitor LFTs , bilirubin level - direct 15.4, total 20.2  - Hepatology consulted : awaiting paracentesis to start prednisone as MDF > 32, will hold on abx per ID   -started on lactulose , 2 BM daily, continue to monitor   -inr daily      #Pancolitis on CT  -SIRS negative ,elevated WBC, no tachycardia, tachypnea, fever  -WBCs 11.77 Neutrophil predom   -CT A/P : Diffuse colonic mural thickening, which may represent pancolitis in the   appropriate clinical setting.   PLAN :   -pain control   -advance Diet as tolerated   -ID: d/c cirpo and flagyl   -can consider GI c/s for possible colonoscopy or outpt f/u     #Low PLT 2/2 to alcohol abuse :   -  -trend CBC for PLT   -monitor for any sign of bleed  -active Type and Screen  -fu iron studies      #BLE swelling   -L>R with 2+ pitting edema .   -was on lasix 40 PO daily , but  pt is noncomplaint   -resumed lasix 40 PO daily      #chronic back pain  #h/o opiate and heroin abuse w/o IVDU  Denies any current use   - Call [facility] [phone] to confirm   -on Methadone 40 mg daily     DVT ppx: Lovenox (hold 11/25 AM dose)  GI ppx: PPI BID   Provider handoff: paracentesis monday 11/25 hold lovenox AM 11/25, f/u preop labs 11/24 at 4 pm , continue to monitor CIWA

## 2024-11-22 NOTE — CONSULT NOTE ADULT - SUBJECTIVE AND OBJECTIVE BOX
DYLAN VICENTE  49y, Male  Allergy: No Known Allergies      All historical available data reviewed.    HPI:  A 48-year-old male patient with a history of chronic back pain and chronic lower extremity swelling presented to the emergency department today complaining of worsening abdominal pain for the past several days. He has a history of opiate and heroin abuse (non-IV drug use) and is currently prescribed methadone 40mg daily through [facility] ([phone]). He also has a history of alcohol abuse beginning in September 2023. While he reports a period of sobriety, he resumed drinking two months ago, consuming approximately two pints of vodka daily, with his last drink this morning. He denies any other recent drug use.    The patient was previously admitted to the ED on June 19th for sepsis secondary to colitis and was discharged on June 26th after receiving antibiotic treatment. Since his discharge, his abdominal pain and distension have progressively worsened, accompanied by nausea, decreased appetite, and vomiting bile at least twice daily. He denies experiencing tremors, insomnia, seizures, diarrhea, constipation, cough, sore throat, fevers, or chills. He also reports the onset of jaundice in early 2024 and was recently diagnosed with cirrhosis.    The patient's brother, Reji Vicente ([phone]), is a healthcare professional and can be contacted for further information.    As per patient he is on bunch of medication but stopped taking them since last few months.     vitally stable in Ed   On labs WBCs 11.77 e Neutrophil predom ,  , deranged coags ,and LFTs  elevated Total bili of 22.9 , bilirubin direct 16.7 , indirect bilirubin of 6.2 ,lipase of 107     On CT A/P : Diffuse colonic mural thickening, which may represent pancolitis in the   appropriate clinical setting. No abscess.  Marked hepatic steatosis., Nonspecific gallbladder distention.    rcvd cipro and flagyl in ed     admitted for further workup .  (20 Nov 2024 22:01)    FAMILY HISTORY:    PAST MEDICAL & SURGICAL HISTORY:  Cirrhosis      Opiate abuse, episodic      Moderate heroin dependence in sustained remission            VITALS:  T(F): 98.5, Max: 98.5 (11-22-24 @ 05:02)  HR: 91  BP: 112/75  RR: 18Vital Signs Last 24 Hrs  T(C): 36.9 (22 Nov 2024 05:02), Max: 36.9 (22 Nov 2024 05:02)  T(F): 98.5 (22 Nov 2024 05:02), Max: 98.5 (22 Nov 2024 05:02)  HR: 91 (22 Nov 2024 05:02) (82 - 99)  BP: 112/75 (22 Nov 2024 05:02) (112/75 - 130/86)  BP(mean): 87 (22 Nov 2024 05:02) (87 - 87)  RR: 18 (22 Nov 2024 05:02) (18 - 18)  SpO2: 98% (22 Nov 2024 05:02) (96% - 98%)    Parameters below as of 22 Nov 2024 05:02  Patient On (Oxygen Delivery Method): room air        TESTS & MEASUREMENTS:                        9.9    9.50  )-----------( 88       ( 21 Nov 2024 07:08 )             28.1     11-21    139  |  100  |  7[L]  ----------------------------<  65[L]  3.1[L]   |  25  |  0.7    Ca    7.2[L]      21 Nov 2024 21:00  Phos  3.0     11-21  Mg     2.1     11-21    TPro  5.9[L]  /  Alb  3.0[L]  /  TBili  22.9[HH]  /  DBili  >17.2[H]  /  AST  478[H]  /  ALT  129[H]  /  AlkPhos  524[H]  11-21    LIVER FUNCTIONS - ( 21 Nov 2024 11:15 )  Alb: 3.0 g/dL / Pro: 5.9 g/dL / ALK PHOS: 524 U/L / ALT: 129 U/L / AST: 478 U/L / GGT: x             Urinalysis Basic - ( 21 Nov 2024 21:00 )    Color: x / Appearance: x / SG: x / pH: x  Gluc: 65 mg/dL / Ketone: x  / Bili: x / Urobili: x   Blood: x / Protein: x / Nitrite: x   Leuk Esterase: x / RBC: x / WBC x   Sq Epi: x / Non Sq Epi: x / Bacteria: x          RADIOLOGY & ADDITIONAL TESTS:  Personal review of radiological diagnostics performed  Echo and EKG results noted when applicable.     MEDICATIONS:  enoxaparin Injectable 40 milliGRAM(s) SubCutaneous every 24 hours  folic acid 1 milliGRAM(s) Oral daily  furosemide    Tablet 40 milliGRAM(s) Oral daily  LORazepam   Injectable 1 milliGRAM(s) IV Push every 1 hour PRN  methadone    Tablet 40 milliGRAM(s) Oral daily  pantoprazole    Tablet 40 milliGRAM(s) Oral before breakfast  thiamine Injectable 100 milliGRAM(s) IV Push daily      ANTIBIOTICS:    
INTERVENTIONAL RADIOLOGY CONSULT:     Procedure Requested:   HPI:  A 48-year-old male patient with a history of chronic back pain and chronic lower extremity swelling presented to the emergency department today complaining of worsening abdominal pain for the past several days. He has a history of opiate and heroin abuse (non-IV drug use) and is currently prescribed methadone 40mg daily through [facility] ([phone]). He also has a history of alcohol abuse beginning in September 2023. While he reports a period of sobriety, he resumed drinking two months ago, consuming approximately two pints of vodka daily, with his last drink this morning. He denies any other recent drug use.    The patient was previously admitted to the ED on June 19th for sepsis secondary to colitis and was discharged on June 26th after receiving antibiotic treatment. Since his discharge, his abdominal pain and distension have progressively worsened, accompanied by nausea, decreased appetite, and vomiting bile at least twice daily. He denies experiencing tremors, insomnia, seizures, diarrhea, constipation, cough, sore throat, fevers, or chills. He also reports the onset of jaundice in early 2024 and was recently diagnosed with cirrhosis.    The patient's brother, Reji Vargas ([phone]), is a healthcare professional and can be contacted for further information.    As per patient he is on bunch of medication but stopped taking them since last few months.     vitally stable in Ed   On labs WBCs 11.77 e Neutrophil predom ,  , deranged coags ,and LFTs  elevated Total bili of 22.9 , bilirubin direct 16.7 , indirect bilirubin of 6.2 ,lipase of 107     On CT A/P : Diffuse colonic mural thickening, which may represent pancolitis in the   appropriate clinical setting. No abscess.  Marked hepatic steatosis., Nonspecific gallbladder distention.    rcvd cipro and flagyl in ed     admitted for further workup .  (20 Nov 2024 22:01)    PAST MEDICAL & SURGICAL HISTORY:  Cirrhosis      Opiate abuse, episodic      Moderate heroin dependence in sustained remission        MEDICATIONS  (STANDING):  enoxaparin Injectable 40 milliGRAM(s) SubCutaneous every 24 hours  folic acid 1 milliGRAM(s) Oral daily  furosemide    Tablet 40 milliGRAM(s) Oral daily  lactulose Syrup 10 Gram(s) Oral daily  methadone    Tablet 40 milliGRAM(s) Oral daily  pantoprazole    Tablet 40 milliGRAM(s) Oral before breakfast  thiamine Injectable 100 milliGRAM(s) IV Push daily    MEDICATIONS  (PRN):  LORazepam   Injectable 1 milliGRAM(s) IV Push every 1 hour PRN CIWA-Ar score 8 or greater    Allergies    No Known Allergies    Intolerances      Physical Exam:   Vital Signs Last 24 Hrs  T(C): 36.9 (22 Nov 2024 13:00), Max: 36.9 (22 Nov 2024 05:02)  T(F): 98.4 (22 Nov 2024 13:00), Max: 98.5 (22 Nov 2024 05:02)  HR: 72 (22 Nov 2024 13:00) (72 - 99)  BP: 101/63 (22 Nov 2024 13:00) (101/63 - 130/86)  BP(mean): 76 (22 Nov 2024 13:00) (76 - 87)  RR: 18 (22 Nov 2024 13:00) (18 - 18)  SpO2: 96% (22 Nov 2024 13:00) (96% - 98%)    Parameters below as of 22 Nov 2024 13:00  Patient On (Oxygen Delivery Method): room air      Labs:                         9.6    8.85  )-----------( 83       ( 22 Nov 2024 06:19 )             27.7     11-22    140  |  102  |  8[L]  ----------------------------<  64[L]  3.3[L]   |  26  |  0.8    Ca    7.6[L]      22 Nov 2024 06:19  Phos  2.3     11-22  Mg     1.6     11-22    TPro  5.4[L]  /  Alb  2.7[L]  /  TBili  20.8[HH]  /  DBili  x   /  AST  406[H]  /  ALT  110[H]  /  AlkPhos  467[H]  11-22    PT/INR - ( 22 Nov 2024 06:19 )   PT: 18.40 sec;   INR: 1.55 ratio         PTT - ( 22 Nov 2024 06:19 )  PTT:33.5 sec    Pertinent labs:                      9.6    8.85  )-----------( 83       ( 22 Nov 2024 06:19 )             27.7       11-22    140  |  102  |  8[L]  ----------------------------<  64[L]  3.3[L]   |  26  |  0.8    Ca    7.6[L]      22 Nov 2024 06:19  Phos  2.3     11-22  Mg     1.6     11-22    TPro  5.4[L]  /  Alb  2.7[L]  /  TBili  20.8[HH]  /  DBili  x   /  AST  406[H]  /  ALT  110[H]  /  AlkPhos  467[H]  11-22      PT/INR - ( 22 Nov 2024 06:19 )   PT: 18.40 sec;   INR: 1.55 ratio         PTT - ( 22 Nov 2024 06:19 )  PTT:33.5 sec  Radiology & Additional Studies:   Radiology imaging reviewed.     ASSESSMENT/ PLAN:     48yoM with history of alcoholic abuse, hepatic steatosis, chronic back pain with opiate use (on methadone) with worsening LFTs and ascites, concerning for alcoholic hepatitis. IR consulted for diagnostic paracentesis.     - Diagnostic paracentesis scheduled for Monday, 11/25/24  - Hold DVT prophylaxis dose prior to procedure   - NPO at midnight prior to procedure    Thank you for the courtesy of this consult, please call [phone] Monday-Friday from 8am to 5pm. All other times please call [phone].  
Gastroenterology Consultation:    Patient is a 49y old  Male who presents with a chief complaint of abdominal pain (21 Nov 2024 11:39)    Admitted on: 11-20-24      HPI: A 48-year-old male patient with a history of chronic back pain and chronic lower extremity swelling presented to the emergency department today complaining of worsening abdominal pain for the past several days. He has a history of opiate and heroin abuse (non-IV drug use) and is currently prescribed methadone 40mg daily through [facility] ([phone]). He also has a history of alcohol abuse beginning in September 2023. While he reports a period of sobriety, he resumed drinking two months ago, consuming approximately two pints of vodka daily, with his last drink this morning. He denies any other recent drug use. Patient was previously admitted to the hospital earlier this year in June where he was diagnosed with acute severe alcoholic hepatitis and was found to be steroid responsive and was discharged on 28 days course of prednisone.  Patient reports compliance of treatment. but continued to drink alcohol.         Prior EGD: none on chart     Prior Colonoscopy: none on chart       PAST MEDICAL & SURGICAL HISTORY:  Cirrhosis      Opiate abuse, episodic      Moderate heroin dependence in sustained remission            FAMILY HISTORY:  negative for gi malignancy     Social History:  Tobacco: denies  Alcohol: as in HPI  Drugs: as in HPI    Home Medications:  methadone 10 mg oral tablet: 4 tab(s) orally once a day (20 Nov 2024 23:43)  multivitamin: 1 tab(s) once a day (20 Nov 2024 23:44)        MEDICATIONS  (STANDING):  enoxaparin Injectable 40 milliGRAM(s) SubCutaneous every 24 hours  folic acid 1 milliGRAM(s) Oral daily  furosemide    Tablet 40 milliGRAM(s) Oral daily  methadone    Tablet 40 milliGRAM(s) Oral daily  pantoprazole    Tablet 40 milliGRAM(s) Oral before breakfast  thiamine Injectable 100 milliGRAM(s) IV Push daily    MEDICATIONS  (PRN):  LORazepam   Injectable 1 milliGRAM(s) IV Push every 1 hour PRN CIWA-Ar score 8 or greater      Allergies  No Known Allergies      Review of Systems:   Constitutional:  No Fever, No Chills  ENT/Mouth:  No Hearing Changes,  No Difficulty Swallowing  Eyes:  No Eye Pain, No Vision Changes  Cardiovascular:  No Chest Pain, No Palpitations  Respiratory:  No Cough, No Dyspnea  Gastrointestinal:  As described in HPI  Musculoskeletal:  No Joint Swelling, No Back Pain  Skin:  No Skin Lesions, No Jaundice  Neuro:  No Syncope, No Dizziness  Heme/Lymph:  No Bruising, No Bleeding.          Physical Examination:  T(C): 36.8 (11-21-24 @ 08:17), Max: 37 (11-21-24 @ 00:50)  HR: 89 (11-21-24 @ 08:17) (75 - 99)  BP: 112/78 (11-21-24 @ 08:17) (102/61 - 138/89)  RR: 18 (11-21-24 @ 00:50) (18 - 18)  SpO2: 96% (11-21-24 @ 08:17) (95% - 98%)    Weight (kg): 79.8 (11-20-24 @ 18:05)        GENERAL: AAOx3, no acute distress.  HEAD:  Atraumatic, Normocephalic  EYES: conjunctiva and sclera clear  NECK: Supple, no JVD or thyromegaly  CHEST/LUNG: Clear to auscultation bilaterally   HEART: Regular rate and rhythm   ABDOMEN: Soft, nontender, distended  NEUROLOGY: No asterixis or tremor.   SKIN: Intact, +jaundice        Data:                        9.9    9.50  )-----------( 88       ( 21 Nov 2024 07:08 )             28.1     Hgb Trend:  9.9  11-21-24 @ 07:08  10.9  11-20-24 @ 19:01      11-21    138  |  99  |  7[L]  ----------------------------<  65[L]  3.5   |  26  |  0.7    Ca    8.1[L]      21 Nov 2024 07:08  Phos  3.0     11-21  Mg     1.8     11-21    TPro  5.8[L]  /  Alb  2.9[L]  /  TBili  21.9[HH]  /  DBili  16.2[H]  /  AST  471[H]  /  ALT  123[H]  /  AlkPhos  509[H]  11-21    Liver panel trend:  TBili 21.9   /      /      /   AlkP 509   /   Tptn 5.8   /   Alb 2.9    /   DBili 16.2      11-21  TBili 20.2   /      /      /   AlkP 491   /   Tptn 5.6   /   Alb 2.9    /   DBili 15.4      11-21  TBili 22.9   /      /      /   AlkP 565   /   Tptn 6.2   /   Alb 3.2    /   DBili 16.7      11-20      PT/INR - ( 21 Nov 2024 07:08 )   PT: 16.70 sec;   INR: 1.41 ratio         PTT - ( 21 Nov 2024 07:08 )  PTT:34.9 sec        Radiology:  CT Abdomen and Pelvis w/ IV Cont:   ACC: [id number] EXAM:  CT ABDOMEN AND PELVIS IC   ORDERED BY: SAMUEL CARTER     PROCEDURE DATE:  11/20/2024          INTERPRETATION:  CLINICAL STATEMENT: Abdominal pain    TECHNIQUE: Contiguous axial CT images were obtained from the lower chest   to the pubic symphysis following administration of 100cc Omnipaque 350   intravenous contrast.  Oral contrast was not administered.  Reformatted   images in the coronal and sagittal planes were acquired.    COMPARISON: CT abdomen and pelvis 6/19/2024    FINDINGS:    LOWER CHEST: Unremarkable.    HEPATOBILIARY: Marked hepatic steatosis. Hepatomegaly, 21 cm in length.   Gallbladder distended. No biliary distention. Patent portal vein.   Unchanged heterogeneous attenuation of liver.    SPLEEN: Unremarkable.    PANCREAS: Unremarkable.    ADRENAL GLANDS: Unremarkable.    KIDNEYS: No hydronephrosis.    ABDOMINOPELVIC NODES: Unremarkable.    PELVIC ORGANS: Unremarkable.    PERITONEUM/MESENTERY/BOWEL: Mild ascites. No bowel obstruction. No   pneumoperitoneum. Normal appendix. Diffuse colonic mural thickening. No   abscess.    BONES/SOFT TISSUES: Unremarkable.    VASCULAR: Unremarkable.    OTHER: Unremarkable.      IMPRESSION:  Diffuse colonic mural thickening, which may represent pancolitis in the   appropriate clinical setting. No abscess.  Marked hepatic steatosis., Nonspecific gallbladder distention.    --- End of Report ---            ELISSA BILLY MD; Attending Radiologist  This document has been electronically signed. Nov 20 2024  7:29PM (11-20-24 @ 19:07)    US Abdomen Complete:   ACC: [id number] EXAM:  US ABDOMEN COMPLETE   ORDERED BY: CAREN PUGA     PROCEDURE DATE:  11/21/2024          INTERPRETATION:  CLINICAL INFORMATION: Distended abdomen. Post biliary   obstruction.    COMPARISON: CT scan of the abdomen and pelvis done11 hours prior.    TECHNIQUE: Sonography of the abdomen.    FINDINGS:  Liver: Please see CT scan.  Bile ducts: Normal caliber. Common bile duct measures 5 mm.  Gallbladder: Biliary sludge. Hydrops of the gallbladder, measuring 15.3 x   5.1 cm. Gallbladder wall normal in thickness measuring 2 mm. No   pericholecystic fluid. Negative sonographic Garcia sign.  Ascites: Moderate.    IMPRESSION:  Sludge within a nonthick walled distended gallbladder as seen on CT scan.    Moderate ascites.    --- End of Report ---          MAYA ENGLAND MD; Resident Radiologist  This document has been electronically signed.  NARGIS APODACA MD; Attending Interventional Radiologist  This document has been electronically signed. Nov 21 2024  9:37AM (11-21-24 @ 08:53)

## 2024-11-22 NOTE — PROGRESS NOTE ADULT - ASSESSMENT
48 year old male patient with chronic back pain, history of opiate use on methadone, ethanol abuse and hepatic steatosis who presented to the ED on 06/19 for worsening abdominal distention and pain in setting of recent jaundice and dark urine, found to have elevated LFTs and concern for cirrhosis and worsened ascites. We are consulted for concern of alcohol hepatitis and cirrhosis.    Severe Alcoholic hepatitis with Jaundice- MDF >32  Suspected Decompensated MAHOGANY Cirrhosis in Setting of Radiological and Clinical Evidence (CHILD C)  Portal HTN with Moderate Ascites s/p Tap on 06/20/2024    * Hemodynamically stable  * exam with abdominal distention   * RUQ US with moderate ascites   * s/p paracentesis on 06/30/2024: 3.2 L, SAAG 1.2, Tptn<1, no SBP     RECOMMENDATIONS  - Trend LFTs and INR  - For ascites , get diagnostic and therapeutic paracentesis   - Monitor BM and avoid constipation. Continue lactulose 20 g Q8h  - Paracentesis and fluid studies. Diuretics after paracentesis  - SBP primary prophylaxis with ciprofloxacin with low protein  - Echo   - Would get MRI abdomen  - Plan for EGD early next week as not compliant.  - Recommend alcohol cessation. Continue multivitamins and folic acid supplements  - Continue [facility] protocol  - Outpatient follow up for HCC screening: US and AFP Q6 months  - Recommend low salt high protein diet  - LT evaluation: has brothers as social support; not marries; lives alone; no kids  - Follow up with our [facility] located at [address] [place] [address], [id number]. Telephone No: [phone]

## 2024-11-23 LAB
ALBUMIN SERPL ELPH-MCNC: 3 G/DL — LOW (ref 3.5–5.2)
ALP SERPL-CCNC: 464 U/L — HIGH (ref 30–115)
ALT FLD-CCNC: 105 U/L — HIGH (ref 0–41)
ANION GAP SERPL CALC-SCNC: 12 MMOL/L — SIGNIFICANT CHANGE UP (ref 7–14)
AST SERPL-CCNC: 349 U/L — HIGH (ref 0–41)
BASOPHILS # BLD AUTO: 0.06 K/UL — SIGNIFICANT CHANGE UP (ref 0–0.2)
BASOPHILS NFR BLD AUTO: 0.7 % — SIGNIFICANT CHANGE UP (ref 0–1)
BILIRUB SERPL-MCNC: 25.7 MG/DL — CRITICAL HIGH (ref 0.2–1.2)
BUN SERPL-MCNC: 9 MG/DL — LOW (ref 10–20)
CALCIUM SERPL-MCNC: 8.2 MG/DL — LOW (ref 8.4–10.4)
CHLORIDE SERPL-SCNC: 97 MMOL/L — LOW (ref 98–110)
CO2 SERPL-SCNC: 27 MMOL/L — SIGNIFICANT CHANGE UP (ref 17–32)
CREAT SERPL-MCNC: 0.9 MG/DL — SIGNIFICANT CHANGE UP (ref 0.7–1.5)
EGFR: 105 ML/MIN/1.73M2 — SIGNIFICANT CHANGE UP
EOSINOPHIL # BLD AUTO: 0.14 K/UL — SIGNIFICANT CHANGE UP (ref 0–0.7)
EOSINOPHIL NFR BLD AUTO: 1.5 % — SIGNIFICANT CHANGE UP (ref 0–8)
GLUCOSE SERPL-MCNC: 83 MG/DL — SIGNIFICANT CHANGE UP (ref 70–99)
HCT VFR BLD CALC: 27.5 % — LOW (ref 42–52)
HGB BLD-MCNC: 9.7 G/DL — LOW (ref 14–18)
IMM GRANULOCYTES NFR BLD AUTO: 0.7 % — HIGH (ref 0.1–0.3)
INR BLD: 1.66 RATIO — HIGH (ref 0.65–1.3)
LYMPHOCYTES # BLD AUTO: 1.2 K/UL — SIGNIFICANT CHANGE UP (ref 1.2–3.4)
LYMPHOCYTES # BLD AUTO: 13.2 % — LOW (ref 20.5–51.1)
MAGNESIUM SERPL-MCNC: 1.9 MG/DL — SIGNIFICANT CHANGE UP (ref 1.8–2.4)
MCHC RBC-ENTMCNC: 35.3 G/DL — SIGNIFICANT CHANGE UP (ref 32–37)
MCHC RBC-ENTMCNC: 39.6 PG — HIGH (ref 27–31)
MCV RBC AUTO: 112.2 FL — HIGH (ref 80–94)
MONOCYTES # BLD AUTO: 0.45 K/UL — SIGNIFICANT CHANGE UP (ref 0.1–0.6)
MONOCYTES NFR BLD AUTO: 4.9 % — SIGNIFICANT CHANGE UP (ref 1.7–9.3)
NEUTROPHILS # BLD AUTO: 7.2 K/UL — HIGH (ref 1.4–6.5)
NEUTROPHILS NFR BLD AUTO: 79 % — HIGH (ref 42.2–75.2)
NRBC # BLD: 0 /100 WBCS — SIGNIFICANT CHANGE UP (ref 0–0)
PLATELET # BLD AUTO: 73 K/UL — LOW (ref 130–400)
PMV BLD: 11.4 FL — HIGH (ref 7.4–10.4)
POTASSIUM SERPL-MCNC: 2.9 MMOL/L — LOW (ref 3.5–5)
POTASSIUM SERPL-SCNC: 2.9 MMOL/L — LOW (ref 3.5–5)
PROT SERPL-MCNC: 5.7 G/DL — LOW (ref 6–8)
PROTHROM AB SERPL-ACNC: 19.8 SEC — HIGH (ref 9.95–12.87)
RBC # BLD: 2.45 M/UL — LOW (ref 4.7–6.1)
RBC # FLD: 18 % — HIGH (ref 11.5–14.5)
SODIUM SERPL-SCNC: 136 MMOL/L — SIGNIFICANT CHANGE UP (ref 135–146)
WBC # BLD: 9.11 K/UL — SIGNIFICANT CHANGE UP (ref 4.8–10.8)
WBC # FLD AUTO: 9.11 K/UL — SIGNIFICANT CHANGE UP (ref 4.8–10.8)

## 2024-11-23 PROCEDURE — 99232 SBSQ HOSP IP/OBS MODERATE 35: CPT

## 2024-11-23 RX ORDER — POTASSIUM CHLORIDE 600 MG/1
20 TABLET, EXTENDED RELEASE ORAL
Refills: 0 | Status: COMPLETED | OUTPATIENT
Start: 2024-11-23 | End: 2024-11-23

## 2024-11-23 RX ORDER — ACETAMINOPHEN, DIPHENHYDRAMINE HCL, PHENYLEPHRINE HCL 325; 25; 5 MG/1; MG/1; MG/1
5 TABLET ORAL ONCE
Refills: 0 | Status: COMPLETED | OUTPATIENT
Start: 2024-11-23 | End: 2024-11-23

## 2024-11-23 RX ORDER — IBUPROFEN 200 MG
600 TABLET ORAL ONCE
Refills: 0 | Status: COMPLETED | OUTPATIENT
Start: 2024-11-23 | End: 2024-11-23

## 2024-11-23 RX ADMIN — PANTOPRAZOLE SODIUM 40 MILLIGRAM(S): 40 TABLET, DELAYED RELEASE ORAL at 05:45

## 2024-11-23 RX ADMIN — POTASSIUM CHLORIDE 50 MILLIEQUIVALENT(S): 600 TABLET, EXTENDED RELEASE ORAL at 12:27

## 2024-11-23 RX ADMIN — METHADONE HYDROCHLORIDE 40 MILLIGRAM(S): 5 TABLET ORAL at 12:26

## 2024-11-23 RX ADMIN — LACTULOSE 10 GRAM(S): 10 SOLUTION ORAL at 12:27

## 2024-11-23 RX ADMIN — Medication 100 MILLIGRAM(S): at 12:27

## 2024-11-23 RX ADMIN — Medication 600 MILLIGRAM(S): at 18:27

## 2024-11-23 RX ADMIN — Medication 1 MILLIGRAM(S): at 12:27

## 2024-11-23 RX ADMIN — ENOXAPARIN SODIUM 40 MILLIGRAM(S): 30 INJECTION SUBCUTANEOUS at 21:16

## 2024-11-23 RX ADMIN — ACETAMINOPHEN, DIPHENHYDRAMINE HCL, PHENYLEPHRINE HCL 5 MILLIGRAM(S): 325; 25; 5 TABLET ORAL at 02:10

## 2024-11-23 RX ADMIN — FUROSEMIDE 40 MILLIGRAM(S): 40 TABLET ORAL at 05:45

## 2024-11-23 RX ADMIN — POTASSIUM CHLORIDE 50 MILLIEQUIVALENT(S): 600 TABLET, EXTENDED RELEASE ORAL at 18:23

## 2024-11-23 RX ADMIN — POTASSIUM CHLORIDE 50 MILLIEQUIVALENT(S): 600 TABLET, EXTENDED RELEASE ORAL at 14:04

## 2024-11-23 NOTE — PROGRESS NOTE ADULT - ASSESSMENT
A 48-year-old male patient with a history of chronic back pain, opiate/heroin use on methadone, alcohol use disorder, cirrhosis  and chronic lower extremity swelling presented to the emergency department today complaining of worsening abdominal pain for the past several days. While he reports a period of sobriety, he resumed drinking two months ago, consuming approximately two pints of vodka daily, with his last drink this morning. He denies any other recent drug use.    # Decompensated liver cirrhosis  due to Etoh use disorder     Suspected thiamine deficiency   -CT A/p: hepatic steatosis , gallbladder distension  -US abdomen showed diffuse ascites,   -Paracentesis with procedure team 11/21: unsuccessful   >> possible another try on Monday by IR,   -Consulted IR 11/21 for paracentesis diagnostic and therapeutic issue with consult was not processed until 11/22, IR placed pt on schedule for monday 11/25  - CIWA protocol symptom triggered   - f/u  CATCH consulted   - Continue thiamine, folate, B12, multivitamin; takes at home.  - f/u hepatitis panel, EBV, CMV  -monitor LFTs , bilirubin level - direct 15.4, total 20.2  - Hepatology consulted : awaiting paracentesis to start prednisone as MDF > 32, will hold on abx per ID   -started on lactulose , 2 BM daily, continue to monitor   - INR  daily, 1.66 today.     # Acute Pancolitis   -SIRS negative   -CT A/P : Diffuse colonic mural thickening, which may represent pancolitis.  -advance Diet as tolerated   - d/c Cipro and Flagyl   -  consider  colonoscopy as  outpt .     # Thrombocytopenia  likely due to liver cirrhosis   -  -trend CBC for PLT   -monitor for any sign of bleed  -active Type and Screen  -fu iron studies    # LE edema/ascites due to liver cirrhosis and portal HTN   -L>R with 2+ pitting edema .   -was on lasix 40 PO daily , but  pt is noncompliant   -resumed lasix 40 PO daily    #chronic back pain  #h/o opiate and heroin abuse w/o IVDU  Denies any current use   - Call [facility] [phone] to confirm   -on Methadone 40 mg daily     DVT ppx: Lovenox (hold 11/25 AM dose)  GI ppx: PPI BID     Pending: Paracentesis/ CIWA  Dispo: TBD

## 2024-11-23 NOTE — PROGRESS NOTE ADULT - SUBJECTIVE AND OBJECTIVE BOX
SUBJECTIVE/OVERNIGHT EVENTS  Today is hospital day 3d. This morning patient was seen and examined at bedside, resting comfortably in bed. No acute or major events overnight.        MEDICATIONS  STANDING MEDICATIONS  enoxaparin Injectable 40 milliGRAM(s) SubCutaneous every 24 hours  folic acid 1 milliGRAM(s) Oral daily  furosemide    Tablet 40 milliGRAM(s) Oral daily  lactulose Syrup 10 Gram(s) Oral daily  methadone    Tablet 40 milliGRAM(s) Oral daily  pantoprazole    Tablet 40 milliGRAM(s) Oral before breakfast  thiamine Injectable 100 milliGRAM(s) IV Push daily    PRN MEDICATIONS  LORazepam   Injectable 1 milliGRAM(s) IV Push every 1 hour PRN    VITALS  T(F): 98.3 (11-23-24 @ 05:47), Max: 98.4 (11-22-24 @ 13:00)  HR: 78 (11-23-24 @ 05:47) (72 - 86)  BP: 116/70 (11-23-24 @ 05:47) (101/63 - 116/70)  RR: 18 (11-23-24 @ 05:47) (18 - 18)  SpO2: 98% (11-23-24 @ 05:47) (96% - 98%)      PHYSICAL EXAM:  GENERAL: NAD, well-groomed, well-developed  HEAD:  Atraumatic, Normocephalic  EYES: EOMI, PERRLA, jaundice  ENMT:  Moist mucous membranes  NECK: Supple, No JVD,  CHEST/LUNG: Clear to auscultation bilaterally  HEART: Regular rate and rhythm  ABDOMEN: Soft, Nontender, Distended  NEURO:  MENTAL STATUS: AAOx3  LANG/SPEECH: Fluent  CRANIAL NERVES:  II: Pupils equal and reactive, no RAPD, normal visual field and fundus  III, IV, VI: EOM intact, no gaze preference or deviation  V: normal  VII: no facial asymmetry  VIII: normal hearing to speech  MOTOR: 5/5 in both upper and lower extremities  REFLEXES:  SENSORY: Normal to touch, temperature & pin prick in all extremiteis  COORD: tremor ue  Gait:   EXTREMITIES: LE edema 2+, no calf tenderness  LYMPH: No lymphadenopathy noted  SKIN: No rashes or lesions         LABS             9.7    9.11  )-----------( 73       ( 11-23-24 @ 06:51 )             27.5     136  |  97  |  9   -------------------------<  83   11-23-24 @ 06:51  2.9  |  27  |  0.9    Ca      8.2     11-23-24 @ 06:51  Phos   2.3     11-22-24 @ 06:19  Mg     1.9     11-23-24 @ 06:51    TPro  5.7  /  Alb  3.0  /  TBili  25.7  /  DBili  x   /  AST  349  /  ALT  105  /  AlkPhos  464  /  GGT  x     11-23-24 @ 06:51    PT/INR - ( 11-23-24 @ 06:51 )   PT: 19.80 sec[H];   INR: 1.66 ratio[H]  PTT - ( 11-22-24 @ 06:19 )  PTT:33.5 sec      Urinalysis Basic - ( 23 Nov 2024 06:51 )    Color: x / Appearance: x / SG: x / pH: x  Gluc: 83 mg/dL / Ketone: x  / Bili: x / Urobili: x   Blood: x / Protein: x / Nitrite: x   Leuk Esterase: x / RBC: x / WBC x   Sq Epi: x / Non Sq Epi: x / Bacteria: x          Culture - Blood (collected 21 Nov 2024 07:08)  Source: .Blood BLOOD  Preliminary Report (22 Nov 2024 14:02):    No growth at 24 hours      IMAGING      ASSESSMENT AND PLAN:    A 48-year-old male patient with a history of chronic back pain, opiate/heroin use on methadone, alcohol use disorder, cirrhosis  and chronic lower extremity swelling presented to the emergency department today complaining of worsening abdominal pain for the past several days. While he reports a period of sobriety, he resumed drinking two months ago, consuming approximately two pints of vodka daily, with his last drink this morning. He denies any other recent drug use.          #Cirrhosis 2/2 Alcoholic Hepatitis  #EtOH abuse   #Jaundice   -CT A/p: hepatic steatosis , gallbladder distenstion  -US abdomen showed diffuse ascites,   -LABS: T Bili 22.9, Lipase 107, -MELD score 22 -, ,   -Paracentesis with procedure team 11/21: unsucessful  -Consulted IR 11/21 for paracentesis diagnositic and theurpeatutic, issue with consult was not processed until 11/22, IR placed pt on schedule for monday 11/25  PLAN :   - Ativan prn  - CIWA protocol   - Addiction and CATCH consulted   - Continue thiamine, folate, B12, multivitamin; takes at home.  - f/u ammonia   - f/u hepatitis panel, EBV, CMV  - f/u Lipids, A1c  -monitor LFTs , bilirubin level - direct 15.4, total 20.2  - Hepatology consulted : awaiting paracentesis to start prednisone as MDF > 32, will hold on abx per ID   -paracentesis with IR monday 11/25, npo after midnight sunday preop labs, hold AC morning MONDAY  started on lactulose , 2 BM daily, continue to monitor   -inr daily      #Pancolitis on CT  -SIRS negative ,elevated WBC, no tachycardia, tachypnea, fever  -WBCs 11.77 Neutrophil predom   -CT A/P : Diffuse colonic mural thickening, which may represent pancolitis in the   appropriate clinical setting.   PLAN :   -pain control   -advance Diet as tolerated   -ID: d/c cirpo and flagyl   -can consider GI c/s for possible colonoscopy or outpt f/u     #Low PLT 2/2 to alcohol abuse :   -  -trend CBC for PLT   -monitor for any sign of bleed  -active Type and Screen  -fu iron studies      #BLE swelling   -L>R with 2+ pitting edema .   -was on lasix 40 PO daily , but  pt is noncomplaint   -resumed lasix 40 PO daily      #chronic back pain  #h/o opiate and heroin abuse w/o IVDU  Denies any current use   - Call [facility] [phone] to confirm   -on Methadone 40 mg daily     DVT ppx: Lovenox (hold 11/25 AM dose)  GI ppx: PPI BID   Provider handoff: paracentesis monday 11/25 hold lovenox AM 11/25, f/u preop labs 11/24 at 4 pm , continue to monitor CIWA

## 2024-11-23 NOTE — PROGRESS NOTE ADULT - SUBJECTIVE AND OBJECTIVE BOX
DYLAN VICENTE  49y  Male      Patient is a 49y old  Male who presents with a chief complaint of abdominal pain.       INTERVAL HPI/OVERNIGHT EVENTS:      ******************************* REVIEW OF SYSTEMS:**********************************************    All other review of systems negative    *********************** VITALS ******************************************    T(F): 97.7 (11-23-24 @ 13:04)  HR: 88 (11-23-24 @ 13:04) (78 - 88)  BP: 119/66 (11-23-24 @ 13:04) (110/73 - 119/66)  RR: 18 (11-23-24 @ 13:04) (18 - 18)  SpO2: 96% (11-23-24 @ 13:04) (96% - 98%)    11-23-24 @ 07:01  -  11-23-24 @ 16:12  --------------------------------------------------------  IN: 0 mL / OUT: 675 mL / NET: -675 mL            11-23-24 @ 07:01  -  11-23-24 @ 16:12  --------------------------------------------------------  IN: 0 mL / OUT: 675 mL / NET: -675 mL        ******************************** PHYSICAL EXAM:**************************************************  GENERAL: NAD    PSYCH: no agitation, baseline mentation  HEENT:     NERVOUS SYSTEM:  Alert & Oriented X3,   PULMONARY: XIMENA, CTA    CARDIOVASCULAR: S1S2 RRR    GI: Soft, NT, distension ; BS present.    EXTREMITIES:  2+ Peripheral Pulses, No clubbing, cyanosis, LE edema    LYMPH: No lymphadenopathy noted    SKIN: No rashes or lesions      **************************** LABS *******************************************************                          9.7    9.11  )-----------( 73       ( 23 Nov 2024 06:51 )             27.5     11-23    136  |  97[L]  |  9[L]  ----------------------------<  83  2.9[L]   |  27  |  0.9    Ca    8.2[L]      23 Nov 2024 06:51  Phos  2.3     11-22  Mg     1.9     11-23    TPro  5.7[L]  /  Alb  3.0[L]  /  TBili  25.7[HH]  /  DBili  x   /  AST  349[H]  /  ALT  105[H]  /  AlkPhos  464[H]  11-23      Urinalysis Basic - ( 23 Nov 2024 06:51 )    Color: x / Appearance: x / SG: x / pH: x  Gluc: 83 mg/dL / Ketone: x  / Bili: x / Urobili: x   Blood: x / Protein: x / Nitrite: x   Leuk Esterase: x / RBC: x / WBC x   Sq Epi: x / Non Sq Epi: x / Bacteria: x      PT/INR - ( 23 Nov 2024 06:51 )   PT: 19.80 sec;   INR: 1.66 ratio         PTT - ( 22 Nov 2024 06:19 )  PTT:33.5 sec  Lactate Trend  11-21 @ 11:15 Lactate:3.4   11-21 @ 07:08 Lactate:2.6   11-21 @ 04:40 Lactate:2.8   11-21 @ 00:34 Lactate:2.8         CAPILLARY BLOOD GLUCOSE              **************************Active Medications *******************************************  No Known Allergies      enoxaparin Injectable 40 milliGRAM(s) SubCutaneous every 24 hours  folic acid 1 milliGRAM(s) Oral daily  furosemide    Tablet 40 milliGRAM(s) Oral daily  lactulose Syrup 10 Gram(s) Oral daily  LORazepam   Injectable 1 milliGRAM(s) IV Push every 1 hour PRN  methadone    Tablet 40 milliGRAM(s) Oral daily  pantoprazole    Tablet 40 milliGRAM(s) Oral before breakfast  potassium chloride  20 mEq/100 mL IVPB 20 milliEquivalent(s) IV Intermittent every 2 hours  thiamine Injectable 100 milliGRAM(s) IV Push daily      ***************************************************  RADIOLOGY & ADDITIONAL TESTS:    Imaging Personally Reviewed:  [ ] YES  [ ] NO    HEALTH ISSUES - PROBLEM Dx:

## 2024-11-24 LAB
ALBUMIN SERPL ELPH-MCNC: 2.7 G/DL — LOW (ref 3.5–5.2)
ALBUMIN SERPL ELPH-MCNC: 2.7 G/DL — LOW (ref 3.5–5.2)
ALP SERPL-CCNC: 402 U/L — HIGH (ref 30–115)
ALP SERPL-CCNC: 424 U/L — HIGH (ref 30–115)
ALT FLD-CCNC: 85 U/L — HIGH (ref 0–41)
ALT FLD-CCNC: 88 U/L — HIGH (ref 0–41)
ANION GAP SERPL CALC-SCNC: 13 MMOL/L — SIGNIFICANT CHANGE UP (ref 7–14)
ANION GAP SERPL CALC-SCNC: 13 MMOL/L — SIGNIFICANT CHANGE UP (ref 7–14)
APTT BLD: 40.1 SEC — HIGH (ref 27–39.2)
AST SERPL-CCNC: 232 U/L — HIGH (ref 0–41)
AST SERPL-CCNC: 264 U/L — HIGH (ref 0–41)
BASOPHILS # BLD AUTO: 0.03 K/UL — SIGNIFICANT CHANGE UP (ref 0–0.2)
BASOPHILS NFR BLD AUTO: 0.4 % — SIGNIFICANT CHANGE UP (ref 0–1)
BILIRUB SERPL-MCNC: 24.3 MG/DL — CRITICAL HIGH (ref 0.2–1.2)
BILIRUB SERPL-MCNC: 25 MG/DL — CRITICAL HIGH (ref 0.2–1.2)
BUN SERPL-MCNC: 9 MG/DL — LOW (ref 10–20)
BUN SERPL-MCNC: 9 MG/DL — LOW (ref 10–20)
CALCIUM SERPL-MCNC: 8 MG/DL — LOW (ref 8.4–10.4)
CALCIUM SERPL-MCNC: 8.3 MG/DL — LOW (ref 8.4–10.5)
CHLORIDE SERPL-SCNC: 96 MMOL/L — LOW (ref 98–110)
CHLORIDE SERPL-SCNC: 97 MMOL/L — LOW (ref 98–110)
CO2 SERPL-SCNC: 27 MMOL/L — SIGNIFICANT CHANGE UP (ref 17–32)
CO2 SERPL-SCNC: 29 MMOL/L — SIGNIFICANT CHANGE UP (ref 17–32)
CREAT SERPL-MCNC: 0.9 MG/DL — SIGNIFICANT CHANGE UP (ref 0.7–1.5)
CREAT SERPL-MCNC: 1 MG/DL — SIGNIFICANT CHANGE UP (ref 0.7–1.5)
EGFR: 105 ML/MIN/1.73M2 — SIGNIFICANT CHANGE UP
EGFR: 92 ML/MIN/1.73M2 — SIGNIFICANT CHANGE UP
EOSINOPHIL # BLD AUTO: 0.31 K/UL — SIGNIFICANT CHANGE UP (ref 0–0.7)
EOSINOPHIL NFR BLD AUTO: 3.8 % — SIGNIFICANT CHANGE UP (ref 0–8)
GLUCOSE SERPL-MCNC: 105 MG/DL — HIGH (ref 70–99)
GLUCOSE SERPL-MCNC: 93 MG/DL — SIGNIFICANT CHANGE UP (ref 70–99)
HCT VFR BLD CALC: 28.2 % — LOW (ref 42–52)
HCT VFR BLD CALC: 28.3 % — LOW (ref 42–52)
HGB BLD-MCNC: 9.8 G/DL — LOW (ref 14–18)
HGB BLD-MCNC: 9.8 G/DL — LOW (ref 14–18)
IMM GRANULOCYTES NFR BLD AUTO: 0.5 % — HIGH (ref 0.1–0.3)
INR BLD: 1.65 RATIO — HIGH (ref 0.65–1.3)
INR BLD: 1.65 RATIO — HIGH (ref 0.65–1.3)
LYMPHOCYTES # BLD AUTO: 0.58 K/UL — LOW (ref 1.2–3.4)
LYMPHOCYTES # BLD AUTO: 7.1 % — LOW (ref 20.5–51.1)
MAGNESIUM SERPL-MCNC: 1.8 MG/DL — SIGNIFICANT CHANGE UP (ref 1.8–2.4)
MCHC RBC-ENTMCNC: 34.6 G/DL — SIGNIFICANT CHANGE UP (ref 32–37)
MCHC RBC-ENTMCNC: 34.8 G/DL — SIGNIFICANT CHANGE UP (ref 32–37)
MCHC RBC-ENTMCNC: 40.7 PG — HIGH (ref 27–31)
MCHC RBC-ENTMCNC: 40.8 PG — HIGH (ref 27–31)
MCV RBC AUTO: 117.4 FL — HIGH (ref 80–94)
MCV RBC AUTO: 117.5 FL — HIGH (ref 80–94)
MONOCYTES # BLD AUTO: 0.31 K/UL — SIGNIFICANT CHANGE UP (ref 0.1–0.6)
MONOCYTES NFR BLD AUTO: 3.8 % — SIGNIFICANT CHANGE UP (ref 1.7–9.3)
NEUTROPHILS # BLD AUTO: 6.85 K/UL — HIGH (ref 1.4–6.5)
NEUTROPHILS NFR BLD AUTO: 84.4 % — HIGH (ref 42.2–75.2)
NRBC # BLD: 0 /100 WBCS — SIGNIFICANT CHANGE UP (ref 0–0)
NRBC # BLD: 0 /100 WBCS — SIGNIFICANT CHANGE UP (ref 0–0)
PLATELET # BLD AUTO: 70 K/UL — LOW (ref 130–400)
PLATELET # BLD AUTO: 72 K/UL — LOW (ref 130–400)
PMV BLD: 11.7 FL — HIGH (ref 7.4–10.4)
PMV BLD: 11.9 FL — HIGH (ref 7.4–10.4)
POTASSIUM SERPL-MCNC: 2.9 MMOL/L — LOW (ref 3.5–5)
POTASSIUM SERPL-MCNC: 3.1 MMOL/L — LOW (ref 3.5–5)
POTASSIUM SERPL-SCNC: 2.9 MMOL/L — LOW (ref 3.5–5)
POTASSIUM SERPL-SCNC: 3.1 MMOL/L — LOW (ref 3.5–5)
PROT SERPL-MCNC: 5.2 G/DL — LOW (ref 6–8)
PROT SERPL-MCNC: 5.2 G/DL — LOW (ref 6–8)
PROTHROM AB SERPL-ACNC: 19.7 SEC — HIGH (ref 9.95–12.87)
PROTHROM AB SERPL-ACNC: 19.7 SEC — HIGH (ref 9.95–12.87)
RBC # BLD: 2.4 M/UL — LOW (ref 4.7–6.1)
RBC # BLD: 2.41 M/UL — LOW (ref 4.7–6.1)
RBC # FLD: 18.2 % — HIGH (ref 11.5–14.5)
RBC # FLD: 18.8 % — HIGH (ref 11.5–14.5)
SODIUM SERPL-SCNC: 136 MMOL/L — SIGNIFICANT CHANGE UP (ref 135–146)
SODIUM SERPL-SCNC: 139 MMOL/L — SIGNIFICANT CHANGE UP (ref 135–146)
WBC # BLD: 8.12 K/UL — SIGNIFICANT CHANGE UP (ref 4.8–10.8)
WBC # BLD: 8.18 K/UL — SIGNIFICANT CHANGE UP (ref 4.8–10.8)
WBC # FLD AUTO: 8.12 K/UL — SIGNIFICANT CHANGE UP (ref 4.8–10.8)
WBC # FLD AUTO: 8.18 K/UL — SIGNIFICANT CHANGE UP (ref 4.8–10.8)

## 2024-11-24 PROCEDURE — 99232 SBSQ HOSP IP/OBS MODERATE 35: CPT

## 2024-11-24 RX ORDER — IBUPROFEN 200 MG
200 TABLET ORAL ONCE
Refills: 0 | Status: COMPLETED | OUTPATIENT
Start: 2024-11-24 | End: 2024-11-24

## 2024-11-24 RX ADMIN — ENOXAPARIN SODIUM 40 MILLIGRAM(S): 30 INJECTION SUBCUTANEOUS at 21:59

## 2024-11-24 RX ADMIN — Medication 200 MILLIGRAM(S): at 10:23

## 2024-11-24 RX ADMIN — FUROSEMIDE 40 MILLIGRAM(S): 40 TABLET ORAL at 05:18

## 2024-11-24 RX ADMIN — PANTOPRAZOLE SODIUM 40 MILLIGRAM(S): 40 TABLET, DELAYED RELEASE ORAL at 06:52

## 2024-11-24 RX ADMIN — Medication 100 MILLIGRAM(S): at 13:42

## 2024-11-24 RX ADMIN — LACTULOSE 10 GRAM(S): 10 SOLUTION ORAL at 11:58

## 2024-11-24 RX ADMIN — Medication 1 MILLIGRAM(S): at 11:58

## 2024-11-24 RX ADMIN — METHADONE HYDROCHLORIDE 40 MILLIGRAM(S): 5 TABLET ORAL at 11:58

## 2024-11-24 NOTE — PROGRESS NOTE ADULT - ASSESSMENT
A 48-year-old male patient with a history of chronic back pain, opiate/heroin use on methadone, alcohol use disorder, cirrhosis  and chronic lower extremity swelling presented to the emergency department today complaining of worsening abdominal pain for the past several days. While he reports a period of sobriety, he resumed drinking two months ago, consuming approximately two pints of vodka daily, with his last drink this morning. He denies any other recent drug use.    # Decompensated liver cirrhosis  due to Etoh use disorder     Suspected thiamine deficiency   -CT A/p: hepatic steatosis , gallbladder distension  -US abdomen showed diffuse ascites,   -Paracentesis with procedure team 11/21: unsuccessful   >> possible another try on Monday by IR,   -Consulted IR 11/21 for paracentesis diagnostic and therapeutic issue with consult was not processed until 11/22, IR placed pt on schedule for monday 11/25  - CIWA protocol symptom triggered   - f/u  CATCH consulted   - Continue thiamine, folate, B12, multivitamin; takes at home.  - f/u hepatitis panel, EBV, CMV  -monitor LFTs , bilirubin level - direct 15.4, total 20.2  - Hepatology consulted : awaiting paracentesis to start prednisone as MDF > 32, will hold on abx per ID   -started on lactulose , 2 BM daily, continue to monitor   - INR  daily, 1.66 today.     # Acute Pancolitis   -SIRS negative   -CT A/P : Diffuse colonic mural thickening, which may represent pancolitis.  -advance Diet as tolerated   - d/c Cipro and Flagyl   -  consider  colonoscopy as  outpt .     # Thrombocytopenia  likely due to liver cirrhosis   -  -trend CBC for PLT   -monitor for any sign of bleed  -active Type and Screen  -fu iron studies    # LE edema/ascites due to liver cirrhosis and portal HTN   -L>R with 2+ pitting edema .   -was on lasix 40 PO daily , but  pt is noncompliant   -resumed lasix 40 PO daily    #chronic back pain  #h/o opiate and heroin abuse w/o IVDU  Denies any current use   - Call [facility] [phone] to confirm   -on Methadone 40 mg daily     DVT ppx: Lovenox (hold 11/25 AM dose)  GI ppx: PPI BID     Pending: Paracentesis/ CIWA  Dispo: TBD

## 2024-11-24 NOTE — PROGRESS NOTE ADULT - SUBJECTIVE AND OBJECTIVE BOX
DYLAN VICENTE  49y  Male      Patient is a 49y old  Male who presents with a chief complaint of abdominal pain     INTERVAL HPI/OVERNIGHT EVENTS:      ******************************* REVIEW OF SYSTEMS:**********************************************    All other review of systems negative    *********************** VITALS ******************************************    T(F): 97.8 (11-24-24 @ 13:17)  HR: 82 (11-24-24 @ 13:17) (72 - 93)  BP: 125/80 (11-24-24 @ 13:17) (103/63 - 125/80)  RR: 16 (11-24-24 @ 13:17) (16 - 18)  SpO2: 94% (11-24-24 @ 05:06) (94% - 94%)    11-23-24 @ 07:01  -  11-24-24 @ 07:00  --------------------------------------------------------  IN: 0 mL / OUT: 675 mL / NET: -675 mL            11-23-24 @ 07:01  -  11-24-24 @ 07:00  --------------------------------------------------------  IN: 0 mL / OUT: 675 mL / NET: -675 mL        ******************************** PHYSICAL EXAM:**************************************************  GENERAL: NAD    PSYCH: no agitation, baseline mentation  HEENT:     NERVOUS SYSTEM:  Alert & Oriented X3,     PULMONARY: XIMENA, CTA    CARDIOVASCULAR: S1S2 RRR    GI: Soft, NT, DISTENDED ; BS present.    EXTREMITIES:  2+ Peripheral Pulses, No clubbing, cyanosis, LE edema    LYMPH: No lymphadenopathy noted    SKIN: No rashes or lesions      **************************** LABS *******************************************************                          9.8    8.12  )-----------( 72       ( 24 Nov 2024 05:44 )             28.2     11-24    136  |  96[L]  |  9[L]  ----------------------------<  105[H]  2.9[L]   |  27  |  0.9    Ca    8.3[L]      24 Nov 2024 05:44  Mg     1.8     11-24    TPro  5.2[L]  /  Alb  2.7[L]  /  TBili  25.0[HH]  /  DBili  x   /  AST  264[H]  /  ALT  88[H]  /  AlkPhos  424[H]  11-24      Urinalysis Basic - ( 24 Nov 2024 05:44 )    Color: x / Appearance: x / SG: x / pH: x  Gluc: 105 mg/dL / Ketone: x  / Bili: x / Urobili: x   Blood: x / Protein: x / Nitrite: x   Leuk Esterase: x / RBC: x / WBC x   Sq Epi: x / Non Sq Epi: x / Bacteria: x      PT/INR - ( 24 Nov 2024 05:44 )   PT: 19.70 sec;   INR: 1.65 ratio           Lactate Trend  11-21 @ 11:15 Lactate:3.4   11-21 @ 07:08 Lactate:2.6   11-21 @ 04:40 Lactate:2.8   11-21 @ 00:34 Lactate:2.8         CAPILLARY BLOOD GLUCOSE              **************************Active Medications *******************************************  No Known Allergies      enoxaparin Injectable 40 milliGRAM(s) SubCutaneous every 24 hours  folic acid 1 milliGRAM(s) Oral daily  furosemide    Tablet 40 milliGRAM(s) Oral daily  lactulose Syrup 10 Gram(s) Oral daily  LORazepam   Injectable 1 milliGRAM(s) IV Push every 1 hour PRN  methadone    Tablet 40 milliGRAM(s) Oral daily  pantoprazole    Tablet 40 milliGRAM(s) Oral before breakfast  thiamine Injectable 100 milliGRAM(s) IV Push daily      ***************************************************  RADIOLOGY & ADDITIONAL TESTS:    Imaging Personally Reviewed:  [ ] YES  [ ] NO    HEALTH ISSUES - PROBLEM Dx:

## 2024-11-25 LAB
ALBUMIN SERPL ELPH-MCNC: 2.5 G/DL — LOW (ref 3.5–5.2)
ALBUMIN SERPL ELPH-MCNC: 2.8 G/DL — LOW (ref 3.5–5.2)
ALP SERPL-CCNC: 373 U/L — HIGH (ref 30–115)
ALP SERPL-CCNC: 420 U/L — HIGH (ref 30–115)
ALT FLD-CCNC: 73 U/L — HIGH (ref 0–41)
ALT FLD-CCNC: 81 U/L — HIGH (ref 0–41)
ANION GAP SERPL CALC-SCNC: 12 MMOL/L — SIGNIFICANT CHANGE UP (ref 7–14)
ANION GAP SERPL CALC-SCNC: 9 MMOL/L — SIGNIFICANT CHANGE UP (ref 7–14)
AST SERPL-CCNC: 180 U/L — HIGH (ref 0–41)
AST SERPL-CCNC: 219 U/L — HIGH (ref 0–41)
B PERT IGG+IGM PNL SER: CLEAR — SIGNIFICANT CHANGE UP
BASOPHILS # BLD AUTO: 0.04 K/UL — SIGNIFICANT CHANGE UP (ref 0–0.2)
BASOPHILS # BLD AUTO: 0.05 K/UL — SIGNIFICANT CHANGE UP (ref 0–0.2)
BASOPHILS NFR BLD AUTO: 0.5 % — SIGNIFICANT CHANGE UP (ref 0–1)
BASOPHILS NFR BLD AUTO: 0.6 % — SIGNIFICANT CHANGE UP (ref 0–1)
BILIRUB SERPL-MCNC: 25.8 MG/DL — CRITICAL HIGH (ref 0.2–1.2)
BUN SERPL-MCNC: 10 MG/DL — SIGNIFICANT CHANGE UP (ref 10–20)
BUN SERPL-MCNC: 9 MG/DL — LOW (ref 10–20)
CALCIUM SERPL-MCNC: 8 MG/DL — LOW (ref 8.4–10.5)
CALCIUM SERPL-MCNC: 8.5 MG/DL — SIGNIFICANT CHANGE UP (ref 8.4–10.5)
CHLORIDE SERPL-SCNC: 98 MMOL/L — SIGNIFICANT CHANGE UP (ref 98–110)
CHLORIDE SERPL-SCNC: 99 MMOL/L — SIGNIFICANT CHANGE UP (ref 98–110)
CO2 SERPL-SCNC: 28 MMOL/L — SIGNIFICANT CHANGE UP (ref 17–32)
CO2 SERPL-SCNC: 29 MMOL/L — SIGNIFICANT CHANGE UP (ref 17–32)
COLOR FLD: YELLOW — SIGNIFICANT CHANGE UP
CREAT SERPL-MCNC: 0.8 MG/DL — SIGNIFICANT CHANGE UP (ref 0.7–1.5)
CREAT SERPL-MCNC: 1.1 MG/DL — SIGNIFICANT CHANGE UP (ref 0.7–1.5)
EGFR: 108 ML/MIN/1.73M2 — SIGNIFICANT CHANGE UP
EGFR: 82 ML/MIN/1.73M2 — SIGNIFICANT CHANGE UP
EOSINOPHIL # BLD AUTO: 0.23 K/UL — SIGNIFICANT CHANGE UP (ref 0–0.7)
EOSINOPHIL # BLD AUTO: 0.25 K/UL — SIGNIFICANT CHANGE UP (ref 0–0.7)
EOSINOPHIL NFR BLD AUTO: 2.7 % — SIGNIFICANT CHANGE UP (ref 0–8)
EOSINOPHIL NFR BLD AUTO: 3.2 % — SIGNIFICANT CHANGE UP (ref 0–8)
FLUID INTAKE SUBSTANCE CLASS: SIGNIFICANT CHANGE UP
GLUCOSE SERPL-MCNC: 77 MG/DL — SIGNIFICANT CHANGE UP (ref 70–99)
GLUCOSE SERPL-MCNC: 95 MG/DL — SIGNIFICANT CHANGE UP (ref 70–99)
HCT VFR BLD CALC: 30.5 % — LOW (ref 42–52)
HCT VFR BLD CALC: 31.1 % — LOW (ref 42–52)
HGB BLD-MCNC: 10.2 G/DL — LOW (ref 14–18)
HGB BLD-MCNC: 10.4 G/DL — LOW (ref 14–18)
IMM GRANULOCYTES NFR BLD AUTO: 0.8 % — HIGH (ref 0.1–0.3)
IMM GRANULOCYTES NFR BLD AUTO: 0.9 % — HIGH (ref 0.1–0.3)
INR BLD: 1.61 RATIO — HIGH (ref 0.65–1.3)
LACTATE SERPL-SCNC: 1.2 MMOL/L — SIGNIFICANT CHANGE UP (ref 0.7–2)
LYMPHOCYTES # BLD AUTO: 0.99 K/UL — LOW (ref 1.2–3.4)
LYMPHOCYTES # BLD AUTO: 1.15 K/UL — LOW (ref 1.2–3.4)
LYMPHOCYTES # BLD AUTO: 11.6 % — LOW (ref 20.5–51.1)
LYMPHOCYTES # BLD AUTO: 14.6 % — LOW (ref 20.5–51.1)
LYMPHOCYTES # FLD: 52 — SIGNIFICANT CHANGE UP
MAGNESIUM SERPL-MCNC: 1.8 MG/DL — SIGNIFICANT CHANGE UP (ref 1.8–2.4)
MAGNESIUM SERPL-MCNC: 1.9 MG/DL — SIGNIFICANT CHANGE UP (ref 1.8–2.4)
MCHC RBC-ENTMCNC: 33.4 G/DL — SIGNIFICANT CHANGE UP (ref 32–37)
MCHC RBC-ENTMCNC: 33.4 G/DL — SIGNIFICANT CHANGE UP (ref 32–37)
MCHC RBC-ENTMCNC: 40.2 PG — HIGH (ref 27–31)
MCHC RBC-ENTMCNC: 40.3 PG — HIGH (ref 27–31)
MCV RBC AUTO: 120.1 FL — HIGH (ref 80–94)
MCV RBC AUTO: 120.6 FL — HIGH (ref 80–94)
MESOTHL CELL # FLD: 2 % — SIGNIFICANT CHANGE UP
MONOCYTES # BLD AUTO: 0.48 K/UL — SIGNIFICANT CHANGE UP (ref 0.1–0.6)
MONOCYTES # BLD AUTO: 0.52 K/UL — SIGNIFICANT CHANGE UP (ref 0.1–0.6)
MONOCYTES NFR BLD AUTO: 6.1 % — SIGNIFICANT CHANGE UP (ref 1.7–9.3)
MONOCYTES NFR BLD AUTO: 6.1 % — SIGNIFICANT CHANGE UP (ref 1.7–9.3)
MONOS+MACROS # FLD: 28 % — SIGNIFICANT CHANGE UP
NEUTROPHILS # BLD AUTO: 5.91 K/UL — SIGNIFICANT CHANGE UP (ref 1.4–6.5)
NEUTROPHILS # BLD AUTO: 6.65 K/UL — HIGH (ref 1.4–6.5)
NEUTROPHILS NFR BLD AUTO: 74.7 % — SIGNIFICANT CHANGE UP (ref 42.2–75.2)
NEUTROPHILS NFR BLD AUTO: 78.2 % — HIGH (ref 42.2–75.2)
NEUTROPHILS-BODY FLUID: 18 % — SIGNIFICANT CHANGE UP
NRBC # BLD: 0 /100 WBCS — SIGNIFICANT CHANGE UP (ref 0–0)
NRBC # BLD: 0 /100 WBCS — SIGNIFICANT CHANGE UP (ref 0–0)
PLATELET # BLD AUTO: 66 K/UL — LOW (ref 130–400)
PLATELET # BLD AUTO: 67 K/UL — LOW (ref 130–400)
PMV BLD: 11.8 FL — HIGH (ref 7.4–10.4)
PMV BLD: 12.1 FL — HIGH (ref 7.4–10.4)
POTASSIUM SERPL-MCNC: 2.9 MMOL/L — LOW (ref 3.5–5)
POTASSIUM SERPL-MCNC: 3.5 MMOL/L — SIGNIFICANT CHANGE UP (ref 3.5–5)
POTASSIUM SERPL-SCNC: 2.9 MMOL/L — LOW (ref 3.5–5)
POTASSIUM SERPL-SCNC: 3.5 MMOL/L — SIGNIFICANT CHANGE UP (ref 3.5–5)
PROT SERPL-MCNC: 4.9 G/DL — LOW (ref 6–8)
PROT SERPL-MCNC: 5.4 G/DL — LOW (ref 6–8)
PROTHROM AB SERPL-ACNC: 19.2 SEC — HIGH (ref 9.95–12.87)
RBC # BLD: 2.53 M/UL — LOW (ref 4.7–6.1)
RBC # BLD: 2.59 M/UL — LOW (ref 4.7–6.1)
RBC # FLD: 18.6 % — HIGH (ref 11.5–14.5)
RBC # FLD: 19.2 % — HIGH (ref 11.5–14.5)
RCV VOL RI: 0 /UL — SIGNIFICANT CHANGE UP (ref 0–0)
SODIUM SERPL-SCNC: 137 MMOL/L — SIGNIFICANT CHANGE UP (ref 135–146)
SODIUM SERPL-SCNC: 138 MMOL/L — SIGNIFICANT CHANGE UP (ref 135–146)
TOTAL NUCLEATED CELL COUNT, BODY FLUID: 91 /UL — SIGNIFICANT CHANGE UP
TUBE TYPE: SIGNIFICANT CHANGE UP
WBC # BLD: 7.9 K/UL — SIGNIFICANT CHANGE UP (ref 4.8–10.8)
WBC # BLD: 8.51 K/UL — SIGNIFICANT CHANGE UP (ref 4.8–10.8)
WBC # FLD AUTO: 7.9 K/UL — SIGNIFICANT CHANGE UP (ref 4.8–10.8)
WBC # FLD AUTO: 8.51 K/UL — SIGNIFICANT CHANGE UP (ref 4.8–10.8)

## 2024-11-25 PROCEDURE — 99233 SBSQ HOSP IP/OBS HIGH 50: CPT

## 2024-11-25 PROCEDURE — 99232 SBSQ HOSP IP/OBS MODERATE 35: CPT

## 2024-11-25 PROCEDURE — 49083 ABD PARACENTESIS W/IMAGING: CPT

## 2024-11-25 RX ADMIN — METHADONE HYDROCHLORIDE 40 MILLIGRAM(S): 5 TABLET ORAL at 11:07

## 2024-11-25 RX ADMIN — PANTOPRAZOLE SODIUM 40 MILLIGRAM(S): 40 TABLET, DELAYED RELEASE ORAL at 06:03

## 2024-11-25 RX ADMIN — LACTULOSE 10 GRAM(S): 10 SOLUTION ORAL at 11:08

## 2024-11-25 RX ADMIN — FUROSEMIDE 40 MILLIGRAM(S): 40 TABLET ORAL at 05:57

## 2024-11-25 RX ADMIN — Medication 1 MILLIGRAM(S): at 11:08

## 2024-11-25 RX ADMIN — Medication 100 MILLIGRAM(S): at 12:36

## 2024-11-25 NOTE — PROGRESS NOTE ADULT - ASSESSMENT
48 year old male patient with chronic back pain, history of opiate use on methadone, ethanol abuse and hepatic steatosis who presented to the ED on 06/19 for worsening abdominal distention and pain in setting of recent jaundice and dark urine, found to have elevated LFTs and concern for cirrhosis and worsened ascites. We are consulted for concern of alcohol hepatitis and cirrhosis.    #Severe Alcoholic hepatitis with Jaundice- MDF >32  #Suspected Decompensated MAHOGANY Cirrhosis in Setting of Radiological and Clinical Evidence (CHILD C)  #Portal HTN with Moderate Ascites s/p Tap 11/25  * Hemodynamically stable  * exam with abdominal distention   * RUQ US with moderate ascites   * s/p paracentesis on 06/30/2024: 3.2 L, SAAG 1.2, Tptn<1, no SBP   - s/p para 11/25: 2.5L, no SBP    RECS  - Will schedule for EGD in am, NPO after MN, obtain pre op labs  - Follow upon fluid studies  - Monitor BM and avoid constipation. Continue lactulose 20 g Q8h  - Paracentesis and fluid studies. Diuretics after paracentesis  - SBP primary prophylaxis with ciprofloxacin with low protein  - Obtain Echo   - Obtain MRI abdomen w w/o IV con liver protocol inpatient as pt non compliant  - Recommend alcohol cessation. Continue multivitamins and folic acid supplements  - Continue CIWA protocol  - Outpatient follow up for HCC screening: US and AFP Q6 months  - Recommend low salt high protein diet  - LT evaluation: has brothers as social support; not marries; lives alone; no kids  - Follow up with our [facility] located at [address] [place] [address], [id number]. Telephone No: [phone]

## 2024-11-25 NOTE — PROGRESS NOTE ADULT - ASSESSMENT
A 48-year-old male patient with a history of chronic back pain, opiate/heroin use on methadone, alcohol use disorder, cirrhosis  and chronic lower extremity swelling presented to the emergency department today complaining of worsening abdominal pain for the past several days. While he reports a period of sobriety, he resumed drinking two months ago, consuming approximately two pints of vodka daily, with his last drink this morning. He denies any other recent drug use.    # Decompensated liver cirrhosis  due to Etoh use disorder     Suspected thiamine deficiency   - CT A/p: hepatic steatosis , gallbladder distension  - US abdomen showed diffuse ascites,   - CIWA protocol.  - f/u  CATCH consulted.   - Continue thiamine, folate, B12, multivitamin; takes at home.  - Monitor LFTs , bilirubin level - direct 15.4, total 20.2  - Hepatology consulted : awaiting paracentesis to start prednisone as MDF > 32, will hold on abx per ID.  - Abdomen MRI with MRCP protocol as per GI.  - EGD by GI tomorrow 11/26, NPO from midnight.  - Started on lactulose, 2 BM daily, continue to monitor  - INR  daily, 1.61 today.  - Paracentesis and fluid studies. Diuretics after paracentesis  - SBP primary prophylaxis with ciprofloxacin with low protein  - Obtain MRI abdomen w w/o IV con liver protocol inpatient as pt non compliant  - Recommend alcohol cessation. Continue multivitamins and folic acid supplements  - Continue CIWA protocol  - Outpatient follow up for HCC screening: US and AFP Q6 months  -  low salt high protein diet     # Acute Pancolitis   - SIRS negative   - CT A/P : Diffuse colonic mural thickening, which may represent pancolitis.  - Off ABx.  - Consider colonoscopy as OP.     # Thrombocytopenia  likely due to liver cirrhosis   -   - Trend CBC for PLT   - Monitor for any sign of bleed  - Active Type and Screen    # Ascites due to liver cirrhosis and portal HTN    - Was on lasix 40 PO daily, but pt is noncompliant, resumed lasix 40 PO daily  -?Aldactone 100 daily.    #chronic back pain  #h/o opiate and heroin abuse w/o IVDU  Denies any current use   - On Methadone 40 mg daily     DVT ppx: Lovenox (held 11/25 AM dose)  GI ppx: PPI BID     #Progress Note Handoff  Pending: Clinical improvement and stability__x__EGD, MRI, Catch team  Pt/Family discussion: Pt/family informed and agree with the current plan  Disposition: Home__    My note supersedes the residents note should a discrepancy arise.    Chart and notes personally reviewed.  Care Discussed with Consultants/Other Providers/ Housestaff [ x] YES [ ] NO   Radiology, labs, old records personally reviewed.    discussed w/ housestaff, nursing, case management    Attestation Statements:    Attestation Statements:  Risk Statement (NON-critical care).     On this date of service, level of risk to patient is considered: High.     Due to: pt with illness causing risk to life or bodily fxn    Time-based billing (NON-critical care).     50 minutes spent on total encounter. The necessity of the time spent during the encounter on this date of service was due to:     time spent on review of labs, imaging studies, old records, obtaining history, personally examining patient, counselling and communicating with patient/ family, entering orders for medications/tests/etc, discussions with other health care providers, documentation in electronic health records, independent interpretation of labs, imaging/procedure results and care coordination.       A 48-year-old male patient with a history of chronic back pain, opiate/heroin use on methadone, alcohol use disorder, cirrhosis  and chronic lower extremity swelling presented to the emergency department today complaining of worsening abdominal pain for the past several days. While he reports a period of sobriety, he resumed drinking two months ago, consuming approximately two pints of vodka daily, with his last drink this morning. He denies any other recent drug use.    # Decompensated liver cirrhosis  due to Etoh use disorder     Suspected thiamine deficiency   - CT A/p: hepatic steatosis , gallbladder distension  - US abdomen showed diffuse ascites,   - CIWA protocol.  - f/u  CATCH consulted.   - Continue thiamine, folate, B12, multivitamin; takes at home.  - Monitor LFTs , bilirubin level - direct 15.4, total 20.2  - Hepatology consulted : awaiting paracentesis to start prednisone as MDF > 32, will hold on abx per ID.  - Abdomen MRI with MRCP protocol as per GI.  - EGD by GI tomorrow 11/26, NPO from midnight.  - Started on lactulose, 2 BM daily, continue to monitor  - INR  daily, 1.61 today.  - Paracentesis and fluid studies. Diuretics after paracentesis  - SBP primary prophylaxis with ciprofloxacin with low protein  - Obtain MRI abdomen w w/o IV con liver protocol inpatient as pt non compliant  - Recommend alcohol cessation. Continue multivitamins and folic acid supplements  - Continue CIWA protocol  - Outpatient follow up for HCC screening: US and AFP Q6 months  -  low salt high protein diet     # Acute Pancolitis   - SIRS negative   - CT A/P : Diffuse colonic mural thickening, which may represent pancolitis.  - Off ABx.  - Consider colonoscopy as OP.     # Thrombocytopenia  likely due to liver cirrhosis   - Trend CBC for PLT   - Monitor for any sign of bleed  - Active Type and Screen    # Ascites due to liver cirrhosis and portal HTN    - Was on lasix 40 PO daily, but pt is noncompliant, resumed lasix 40 PO daily  -?Aldactone 100 daily.    #chronic back pain  #h/o opiate and heroin abuse w/o IVDU  Denies any current use   - On Methadone 40 mg daily     DVT ppx: Lovenox (held 11/25 AM dose)  GI ppx: PPI BID     #Progress Note Handoff  Pending: Clinical improvement and stability__x__EGD, MRI, Catch team  Pt/Family discussion: Pt/family informed and agree with the current plan  Disposition: Home__    My note supersedes the residents note should a discrepancy arise.    Chart and notes personally reviewed.  Care Discussed with Consultants/Other Providers/ Housestaff [ x] YES [ ] NO   Radiology, labs, old records personally reviewed.    discussed w/ housestaff, nursing, case management    Attestation Statements:    Attestation Statements:  Risk Statement (NON-critical care).     On this date of service, level of risk to patient is considered: High.     Due to: pt with illness causing risk to life or bodily fxn    Time-based billing (NON-critical care).     50 minutes spent on total encounter. The necessity of the time spent during the encounter on this date of service was due to:     time spent on review of labs, imaging studies, old records, obtaining history, personally examining patient, counselling and communicating with patient/ family, entering orders for medications/tests/etc, discussions with other health care providers, documentation in electronic health records, independent interpretation of labs, imaging/procedure results and care coordination.

## 2024-11-25 NOTE — PROGRESS NOTE ADULT - SUBJECTIVE AND OBJECTIVE BOX
Patient is a 49y old  Male who presents with a chief complaint of abdominal pain (25 Nov 2024 16:49)    INTERVAL HPI/OVERNIGHT EVENTS: Patient was examined and seen at bedside. This morning pt is resting comfortably in bed and reports no new issues or overnight events. No new complaints, feels ok. Reports abdominal distention.  ROS: Denies CP, SOB, AP, new weakness  All other systems reviewed and are within normal limits.  InitialHPI:  A 48-year-old male patient with a history of chronic back pain and chronic lower extremity swelling presented to the emergency department today complaining of worsening abdominal pain for the past several days. He has a history of opiate and heroin abuse (non-IV drug use) and is currently prescribed methadone 40mg daily through [facility] ([phone]). He also has a history of alcohol abuse beginning in September 2023. While he reports a period of sobriety, he resumed drinking two months ago, consuming approximately two pints of vodka daily, with his last drink this morning. He denies any other recent drug use.    The patient was previously admitted to the ED on June 19th for sepsis secondary to colitis and was discharged on June 26th after receiving antibiotic treatment. Since his discharge, his abdominal pain and distension have progressively worsened, accompanied by nausea, decreased appetite, and vomiting bile at least twice daily. He denies experiencing tremors, insomnia, seizures, diarrhea, constipation, cough, sore throat, fevers, or chills. He also reports the onset of jaundice in early 2024 and was recently diagnosed with cirrhosis.    The patient's brother, Reji Vargas ([phone]), is a healthcare professional and can be contacted for further information.    As per patient he is on bunch of medication but stopped taking them since last few months.     vitally stable in Ed   On labs WBCs 11.77 e Neutrophil predom ,  , deranged coags ,and LFTs  elevated Total bili of 22.9 , bilirubin direct 16.7 , indirect bilirubin of 6.2 ,lipase of 107     On CT A/P : Diffuse colonic mural thickening, which may represent pancolitis in the   appropriate clinical setting. No abscess.  Marked hepatic steatosis., Nonspecific gallbladder distention.    rcvd cipro and flagyl in ed     admitted for further workup .  (20 Nov 2024 22:01)    PAST MEDICAL & SURGICAL HISTORY:  Cirrhosis      Opiate abuse, episodic      Moderate heroin dependence in sustained remission          General: NAD, AAO3, +jaundiced  HEENT:  EOMI, no LAD  CV: S1 S2  Resp: decreased breath sounds at bases  GI: NT/firm,, +distention; +BS, +telangiectasias,  MS: no clubbing/cyanosis/edema, + pulses b/l  Neuro: nonfocal, +reflexes thruout. No asterixis.    MEDICATIONS  (STANDING):  albumin human 25% IVPB 50 milliLiter(s) IV Intermittent every 30 minutes  folic acid 1 milliGRAM(s) Oral daily  furosemide    Tablet 40 milliGRAM(s) Oral daily  lactulose Syrup 10 Gram(s) Oral daily  methadone    Tablet 40 milliGRAM(s) Oral daily  pantoprazole    Tablet 40 milliGRAM(s) Oral before breakfast  thiamine Injectable 100 milliGRAM(s) IV Push daily    MEDICATIONS  (PRN):  LORazepam   Injectable 1 milliGRAM(s) IV Push every 1 hour PRN CIWA-Ar score 8 or greater    Vital Signs Last 24 Hrs  T(C): 36.6 (25 Nov 2024 10:27), Max: 36.6 (24 Nov 2024 20:00)  T(F): 97.9 (25 Nov 2024 10:27), Max: 97.9 (24 Nov 2024 20:00)  HR: 77 (25 Nov 2024 10:27) (77 - 77)  BP: 111/69 (25 Nov 2024 10:27) (103/67 - 111/69)  BP(mean): 83 (25 Nov 2024 04:59) (83 - 83)  RR: 18 (25 Nov 2024 10:27) (18 - 18)  SpO2: 97% (25 Nov 2024 04:59) (96% - 97%)    Parameters below as of 25 Nov 2024 04:59  Patient On (Oxygen Delivery Method): room air      CAPILLARY BLOOD GLUCOSE                              10.4   7.90  )-----------( 66       ( 25 Nov 2024 05:48 )             31.1     11-25    138  |  98  |  9[L]  ----------------------------<  77  3.5   |  28  |  0.8    Ca    8.5      25 Nov 2024 05:48  Mg     1.9     11-25    TPro  5.4[L]  /  Alb  2.8[L]  /  TBili  25.8[HH]  /  DBili  x   /  AST  219[H]  /  ALT  81[H]  /  AlkPhos  420[H]  11-25    LIVER FUNCTIONS - ( 25 Nov 2024 05:48 )  Alb: 2.8 g/dL / Pro: 5.4 g/dL / ALK PHOS: 420 U/L / ALT: 81 U/L / AST: 219 U/L / GGT: x               PT/INR - ( 25 Nov 2024 05:48 )   PT: 19.20 sec;   INR: 1.61 ratio         PTT - ( 24 Nov 2024 17:03 )  PTT:40.1 sec  Urinalysis Basic - ( 25 Nov 2024 05:48 )    Color: x / Appearance: x / SG: x / pH: x  Gluc: 77 mg/dL / Ketone: x  / Bili: x / Urobili: x   Blood: x / Protein: x / Nitrite: x   Leuk Esterase: x / RBC: x / WBC x   Sq Epi: x / Non Sq Epi: x / Bacteria: x              Chart, Consultant(s) Notes Reviewed:  [x ] YES  [ ] NO  Care Discussed with Consultants/Other Providers/ Housestaff [ x] YES  [ ] NO  Radiology, labs, old available records personally reviewed.

## 2024-11-25 NOTE — PROGRESS NOTE ADULT - SUBJECTIVE AND OBJECTIVE BOX
Gastroenterology progress note:     Patient is a 49y old  Male who presents with a chief complaint of abdominal pain (25 Nov 2024 15:34)       Admitted on: 11-20-24    We are following the patient for:       Interval History:    No acute events overnight.   - Diet -   - last BM -   - Abdominal pain -       PAST MEDICAL & SURGICAL HISTORY:  Cirrhosis      Opiate abuse, episodic      Moderate heroin dependence in sustained remission          MEDICATIONS  (STANDING):  albumin human 25% IVPB 50 milliLiter(s) IV Intermittent every 30 minutes  folic acid 1 milliGRAM(s) Oral daily  furosemide    Tablet 40 milliGRAM(s) Oral daily  lactulose Syrup 10 Gram(s) Oral daily  methadone    Tablet 40 milliGRAM(s) Oral daily  pantoprazole    Tablet 40 milliGRAM(s) Oral before breakfast  thiamine Injectable 100 milliGRAM(s) IV Push daily    MEDICATIONS  (PRN):  LORazepam   Injectable 1 milliGRAM(s) IV Push every 1 hour PRN CIWA-Ar score 8 or greater      Allergies  No Known Allergies      Review of Systems:   Cardiovascular:  No Chest Pain, No Palpitations  Respiratory:  No Cough, No Dyspnea  Gastrointestinal:  As described in HPI  Skin:  No Skin Lesions, No Jaundice  Neuro:  No Syncope, No Dizziness    Physical Examination:  T(C): 36.6 (11-25-24 @ 10:27), Max: 36.6 (11-24-24 @ 20:00)  HR: 77 (11-25-24 @ 10:27) (77 - 77)  BP: 111/69 (11-25-24 @ 10:27) (103/67 - 111/69)  RR: 18 (11-25-24 @ 10:27) (18 - 18)  SpO2: 97% (11-25-24 @ 04:59) (96% - 97%)  Weight (kg): 79.8 (11-25-24 @ 10:27)      GENERAL: AAOx3, no acute distress.  HEAD:  Atraumatic, Normocephalic  EYES: conjunctiva and sclera clear  NECK: Supple, no JVD or thyromegaly  CHEST/LUNG: Clear to auscultation bilaterally; No wheeze, rhonchi, or rales  HEART: Regular rate and rhythm; normal S1, S2, No murmurs.  ABDOMEN: Soft, nontender, nondistended; Bowel sounds present  NEUROLOGY: No asterixis or tremor.   SKIN: Intact, no jaundice     Data:                        10.4   7.90  )-----------( 66       ( 25 Nov 2024 05:48 )             31.1     Hgb trend:  10.4  11-25-24 @ 05:48  9.8  11-24-24 @ 17:03  9.8  11-24-24 @ 05:44  9.7  11-23-24 @ 06:51        11-25    138  |  98  |  9[L]  ----------------------------<  77  3.5   |  28  |  0.8    Ca    8.5      25 Nov 2024 05:48  Mg     1.9     11-25    TPro  5.4[L]  /  Alb  2.8[L]  /  TBili  25.8[HH]  /  DBili  x   /  AST  219[H]  /  ALT  81[H]  /  AlkPhos  420[H]  11-25    Liver panel trend:  TBili 25.8   /      /   ALT 81   /   AlkP 420   /   Tptn 5.4   /   Alb 2.8    /   DBili --      11-25  TBili 24.3   /      /   ALT 85   /   AlkP 402   /   Tptn 5.2   /   Alb 2.7    /   DBili --      11-24  TBili 25.0   /      /   ALT 88   /   AlkP 424   /   Tptn 5.2   /   Alb 2.7    /   DBili --      11-24  TBili 25.7   /      /      /   AlkP 464   /   Tptn 5.7   /   Alb 3.0    /   DBili --      11-23  TBili 20.8   /      /      /   AlkP 467   /   Tptn 5.4   /   Alb 2.7    /   DBili --      11-22  TBili 22.9   /      /      /   AlkP 524   /   Tptn 5.9   /   Alb 3.0    /   DBili >17.2      11-21  TBili 21.9   /      /      /   AlkP 509   /   Tptn 5.8   /   Alb 2.9    /   DBili 16.2      11-21  TBili 20.2   /      /      /   AlkP 491   /   Tptn 5.6   /   Alb 2.9    /   DBili 15.4      11-21  TBili 22.9   /      /      /   AlkP 565   /   Tptn 6.2   /   Alb 3.2    /   DBili 16.7      11-20      PT/INR - ( 25 Nov 2024 05:48 )   PT: 19.20 sec;   INR: 1.61 ratio         PTT - ( 24 Nov 2024 17:03 )  PTT:40.1 sec       Radiology:       Gastroenterology progress note:     Patient is a 49y old  Male who presents with a chief complaint of abdominal pain (25 Nov 2024 15:34)       Admitted on: 11-20-24    We are following the patient for: alc hepatitis,       Interval History:s/p para today, worsening MDF      PAST MEDICAL & SURGICAL HISTORY:  Cirrhosis      Opiate abuse, episodic      Moderate heroin dependence in sustained remission          MEDICATIONS  (STANDING):  albumin human 25% IVPB 50 milliLiter(s) IV Intermittent every 30 minutes  folic acid 1 milliGRAM(s) Oral daily  furosemide    Tablet 40 milliGRAM(s) Oral daily  lactulose Syrup 10 Gram(s) Oral daily  methadone    Tablet 40 milliGRAM(s) Oral daily  pantoprazole    Tablet 40 milliGRAM(s) Oral before breakfast  thiamine Injectable 100 milliGRAM(s) IV Push daily    MEDICATIONS  (PRN):  LORazepam   Injectable 1 milliGRAM(s) IV Push every 1 hour PRN CIWA-Ar score 8 or greater      Allergies  No Known Allergies      Review of Systems:   Cardiovascular:  No Chest Pain, No Palpitations  Respiratory:  No Cough, No Dyspnea  Gastrointestinal:  As described in HPI  Skin:  No Skin Lesions, No Jaundice  Neuro:  No Syncope, No Dizziness    Physical Examination:  T(C): 36.6 (11-25-24 @ 10:27), Max: 36.6 (11-24-24 @ 20:00)  HR: 77 (11-25-24 @ 10:27) (77 - 77)  BP: 111/69 (11-25-24 @ 10:27) (103/67 - 111/69)  RR: 18 (11-25-24 @ 10:27) (18 - 18)  SpO2: 97% (11-25-24 @ 04:59) (96% - 97%)  Weight (kg): 79.8 (11-25-24 @ 10:27)      GENERAL: AAOx3, no acute distress.  HEAD:  Atraumatic, Normocephalic  EYES: conjunctiva and sclera clear  NECK: Supple, no JVD or thyromegaly  CHEST/LUNG: Clear to auscultation bilaterally; No wheeze, rhonchi, or rales  HEART: Regular rate and rhythm; normal S1, S2, No murmurs.  ABDOMEN: Soft, nontender, nondistended; Bowel sounds present  NEUROLOGY: No asterixis or tremor.   SKIN: jaundice +     Data:                        10.4   7.90  )-----------( 66       ( 25 Nov 2024 05:48 )             31.1     Hgb trend:  10.4  11-25-24 @ 05:48  9.8  11-24-24 @ 17:03  9.8  11-24-24 @ 05:44  9.7  11-23-24 @ 06:51        11-25    138  |  98  |  9[L]  ----------------------------<  77  3.5   |  28  |  0.8    Ca    8.5      25 Nov 2024 05:48  Mg     1.9     11-25    TPro  5.4[L]  /  Alb  2.8[L]  /  TBili  25.8[HH]  /  DBili  x   /  AST  219[H]  /  ALT  81[H]  /  AlkPhos  420[H]  11-25    Liver panel trend:  TBili 25.8   /      /   ALT 81   /   AlkP 420   /   Tptn 5.4   /   Alb 2.8    /   DBili --      11-25  TBili 24.3   /      /   ALT 85   /   AlkP 402   /   Tptn 5.2   /   Alb 2.7    /   DBili --      11-24  TBili 25.0   /      /   ALT 88   /   AlkP 424   /   Tptn 5.2   /   Alb 2.7    /   DBili --      11-24  TBili 25.7   /      /      /   AlkP 464   /   Tptn 5.7   /   Alb 3.0    /   DBili --      11-23  TBili 20.8   /      /      /   AlkP 467   /   Tptn 5.4   /   Alb 2.7    /   DBili --      11-22  TBili 22.9   /      /      /   AlkP 524   /   Tptn 5.9   /   Alb 3.0    /   DBili >17.2      11-21  TBili 21.9   /      /      /   AlkP 509   /   Tptn 5.8   /   Alb 2.9    /   DBili 16.2      11-21  TBili 20.2   /      /      /   AlkP 491   /   Tptn 5.6   /   Alb 2.9    /   DBili 15.4      11-21  TBili 22.9   /      /      /   AlkP 565   /   Tptn 6.2   /   Alb 3.2    /   DBili 16.7      11-20      PT/INR - ( 25 Nov 2024 05:48 )   PT: 19.20 sec;   INR: 1.61 ratio         PTT - ( 24 Nov 2024 17:03 )  PTT:40.1 sec

## 2024-11-25 NOTE — PROGRESS NOTE ADULT - SUBJECTIVE AND OBJECTIVE BOX
SUBJECTIVE:    Patient is a 49y old Male who presents with a chief complaint of abdominal pain (24 Nov 2024 15:34)  Currently admitted to medicine with the primary diagnosis of Jaundice  Today is hospital day 5d. This morning he is resting comfortably in bed and reports no new issues or overnight events.     PAST MEDICAL & SURGICAL HISTORY  Cirrhosis    Opiate abuse, episodic    Moderate heroin dependence in sustained remission      SOCIAL HISTORY:  Negative for smoking/alcohol/drug use.     ALLERGIES:  No Known Allergies    MEDICATIONS:  STANDING MEDICATIONS  albumin human 25% IVPB 50 milliLiter(s) IV Intermittent every 30 minutes  folic acid 1 milliGRAM(s) Oral daily  furosemide    Tablet 40 milliGRAM(s) Oral daily  lactulose Syrup 10 Gram(s) Oral daily  methadone    Tablet 40 milliGRAM(s) Oral daily  pantoprazole    Tablet 40 milliGRAM(s) Oral before breakfast  thiamine Injectable 100 milliGRAM(s) IV Push daily    PRN MEDICATIONS  LORazepam   Injectable 1 milliGRAM(s) IV Push every 1 hour PRN    VITALS:   T(F): 97.9  HR: 77  BP: 111/69  RR: 18  SpO2: 97%    LABS:                        10.4   7.90  )-----------( 66       ( 25 Nov 2024 05:48 )             31.1     11-25    138  |  98  |  9[L]  ----------------------------<  77  3.5   |  28  |  0.8    Ca    8.5      25 Nov 2024 05:48  Mg     1.9     11-25    TPro  5.4[L]  /  Alb  2.8[L]  /  TBili  25.8[HH]  /  DBili  x   /  AST  219[H]  /  ALT  81[H]  /  AlkPhos  420[H]  11-25    PT/INR - ( 25 Nov 2024 05:48 )   PT: 19.20 sec;   INR: 1.61 ratio         PTT - ( 24 Nov 2024 17:03 )  PTT:40.1 sec  Urinalysis Basic - ( 25 Nov 2024 05:48 )    Color: x / Appearance: x / SG: x / pH: x  Gluc: 77 mg/dL / Ketone: x  / Bili: x / Urobili: x   Blood: x / Protein: x / Nitrite: x   Leuk Esterase: x / RBC: x / WBC x   Sq Epi: x / Non Sq Epi: x / Bacteria: x        Lactate, Blood: 1.2 mmol/L (11-25-24 @ 12:00)          RADIOLOGY:    PHYSICAL EXAM:  GEN: Jaundice  LUNGS: Clear to auscultation bilaterally   HEART: Regular  ABD: Non-tender, distended with ascities, spider angiomas  EXT: NC/NC/NE/2+PP/GUAJARDO.  NEURO: AAOX3    Intravenous access:   NG tube:   Felix Catheter:

## 2024-11-25 NOTE — PROGRESS NOTE ADULT - ASSESSMENT
A 48-year-old male patient with a history of chronic back pain, opiate/heroin use on methadone, alcohol use disorder, cirrhosis  and chronic lower extremity swelling presented to the emergency department today complaining of worsening abdominal pain for the past several days. While he reports a period of sobriety, he resumed drinking two months ago, consuming approximately two pints of vodka daily, with his last drink this morning. He denies any other recent drug use.    # Decompensated liver cirrhosis  due to Etoh use disorder     Suspected thiamine deficiency   - CT A/p: hepatic steatosis , gallbladder distension  - US abdomen showed diffuse ascites,   - Paracentesis with procedure team 11/21: unsuccessful >> Para today by IR 11/25.  - CIWA protocol.  - f/u  CATCH consulted.   - Continue thiamine, folate, B12, multivitamin; takes at home.  - Monitor LFTs , bilirubin level - direct 15.4, total 20.2  - Hepatology consulted : awaiting paracentesis to start prednisone as MDF > 32, will hold on abx per ID.  - Abdomen MRI with MRCP protocol as per GI.  - EGD by GI tomorrow 11/26, NPO from midnight.  - Started on lactulose, 2 BM daily, continue to monitor  - INR  daily, 1.61 today.     # Acute Pancolitis   - SIRS negative   - CT A/P : Diffuse colonic mural thickening, which may represent pancolitis.  - Off ABx.  - Consider colonoscopy as OP.     # Thrombocytopenia  likely due to liver cirrhosis   -   - Trend CBC for PLT   - Monitor for any sign of bleed  - Active Type and Screen    # LE edema/ascites due to liver cirrhosis and portal HTN   - L>R with 2+ pitting edema .   - Was on lasix 40 PO daily, but pt is noncompliant, resumed lasix 40 PO daily.    #chronic back pain  #h/o opiate and heroin abuse w/o IVDU  Denies any current use   - On Methadone 40 mg daily     DVT ppx: Lovenox (held 11/25 AM dose)  GI ppx: PPI BID     Handoff:  - Paracentesis by IR today. Resume Diet after.  - EGD Tomorrow. NPO from midnight.  - F/u Abdomen MRI.

## 2024-11-26 ENCOUNTER — TRANSCRIPTION ENCOUNTER (OUTPATIENT)
Age: 49
End: 2024-11-26

## 2024-11-26 LAB
ALBUMIN FLD-MCNC: 0.6 G/DL — SIGNIFICANT CHANGE UP
ALBUMIN SERPL ELPH-MCNC: 2.9 G/DL — LOW (ref 3.5–5.2)
ALP SERPL-CCNC: 415 U/L — HIGH (ref 30–115)
ALT FLD-CCNC: 77 U/L — HIGH (ref 0–41)
ANION GAP SERPL CALC-SCNC: 9 MMOL/L — SIGNIFICANT CHANGE UP (ref 7–14)
APTT BLD: 36.5 SEC — SIGNIFICANT CHANGE UP (ref 27–39.2)
APTT BLD: 36.8 SEC — SIGNIFICANT CHANGE UP (ref 27–39.2)
AST SERPL-CCNC: 193 U/L — HIGH (ref 0–41)
BILIRUB SERPL-MCNC: 22.6 MG/DL — CRITICAL HIGH (ref 0.2–1.2)
BILIRUB SERPL-MCNC: 27.1 MG/DL — CRITICAL HIGH (ref 0.2–1.2)
BUN SERPL-MCNC: 11 MG/DL — SIGNIFICANT CHANGE UP (ref 10–20)
CALCIUM SERPL-MCNC: 8.4 MG/DL — SIGNIFICANT CHANGE UP (ref 8.4–10.5)
CHLORIDE SERPL-SCNC: 100 MMOL/L — SIGNIFICANT CHANGE UP (ref 98–110)
CO2 SERPL-SCNC: 28 MMOL/L — SIGNIFICANT CHANGE UP (ref 17–32)
CREAT SERPL-MCNC: 1 MG/DL — SIGNIFICANT CHANGE UP (ref 0.7–1.5)
CULTURE RESULTS: SIGNIFICANT CHANGE UP
EGFR: 92 ML/MIN/1.73M2 — SIGNIFICANT CHANGE UP
GLUCOSE FLD-MCNC: 87 MG/DL — SIGNIFICANT CHANGE UP
GLUCOSE SERPL-MCNC: 79 MG/DL — SIGNIFICANT CHANGE UP (ref 70–99)
GRAM STN FLD: SIGNIFICANT CHANGE UP
HCT VFR BLD CALC: 31.2 % — LOW (ref 42–52)
HGB BLD-MCNC: 10.4 G/DL — LOW (ref 14–18)
INR BLD: 1.62 RATIO — HIGH (ref 0.65–1.3)
INR BLD: 1.63 RATIO — HIGH (ref 0.65–1.3)
LDH SERPL L TO P-CCNC: 89 U/L — SIGNIFICANT CHANGE UP
MAGNESIUM SERPL-MCNC: 1.7 MG/DL — LOW (ref 1.8–2.4)
MCHC RBC-ENTMCNC: 33.3 G/DL — SIGNIFICANT CHANGE UP (ref 32–37)
MCHC RBC-ENTMCNC: 40.2 PG — HIGH (ref 27–31)
MCV RBC AUTO: 120.5 FL — HIGH (ref 80–94)
NRBC # BLD: 0 /100 WBCS — SIGNIFICANT CHANGE UP (ref 0–0)
PLATELET # BLD AUTO: 71 K/UL — LOW (ref 130–400)
PMV BLD: 11.9 FL — HIGH (ref 7.4–10.4)
POTASSIUM SERPL-MCNC: 4.1 MMOL/L — SIGNIFICANT CHANGE UP (ref 3.5–5)
POTASSIUM SERPL-SCNC: 4.1 MMOL/L — SIGNIFICANT CHANGE UP (ref 3.5–5)
PROT FLD-MCNC: 1 G/DL — SIGNIFICANT CHANGE UP
PROT SERPL-MCNC: 5.7 G/DL — LOW (ref 6–8)
PROTHROM AB SERPL-ACNC: 19.3 SEC — HIGH (ref 9.95–12.87)
PROTHROM AB SERPL-ACNC: 19.4 SEC — HIGH (ref 9.95–12.87)
RBC # BLD: 2.59 M/UL — LOW (ref 4.7–6.1)
RBC # FLD: 18.6 % — HIGH (ref 11.5–14.5)
SODIUM SERPL-SCNC: 137 MMOL/L — SIGNIFICANT CHANGE UP (ref 135–146)
SPECIMEN SOURCE: SIGNIFICANT CHANGE UP
SPECIMEN SOURCE: SIGNIFICANT CHANGE UP
WBC # BLD: 8.38 K/UL — SIGNIFICANT CHANGE UP (ref 4.8–10.8)
WBC # FLD AUTO: 8.38 K/UL — SIGNIFICANT CHANGE UP (ref 4.8–10.8)

## 2024-11-26 PROCEDURE — 99233 SBSQ HOSP IP/OBS HIGH 50: CPT

## 2024-11-26 PROCEDURE — 74183 MRI ABD W/O CNTR FLWD CNTR: CPT | Mod: 26

## 2024-11-26 PROCEDURE — 0653T EGD FLX TRANSNASAL BX 1/MLT: CPT

## 2024-11-26 RX ORDER — POTASSIUM CHLORIDE 600 MG/1
20 TABLET, EXTENDED RELEASE ORAL
Refills: 0 | Status: COMPLETED | OUTPATIENT
Start: 2024-11-26 | End: 2024-11-26

## 2024-11-26 RX ORDER — ENOXAPARIN SODIUM 30 MG/.3ML
40 INJECTION SUBCUTANEOUS EVERY 24 HOURS
Refills: 0 | Status: DISCONTINUED | OUTPATIENT
Start: 2024-11-26 | End: 2024-11-28

## 2024-11-26 RX ORDER — LANOLIN ALCOHOL/MO/W.PET/CERES
100 CREAM (GRAM) TOPICAL DAILY
Refills: 0 | Status: DISCONTINUED | OUTPATIENT
Start: 2024-11-26 | End: 2024-11-28

## 2024-11-26 RX ORDER — CARVEDILOL 25 MG/1
3.12 TABLET, FILM COATED ORAL EVERY 12 HOURS
Refills: 0 | Status: DISCONTINUED | OUTPATIENT
Start: 2024-11-26 | End: 2024-11-28

## 2024-11-26 RX ADMIN — PANTOPRAZOLE SODIUM 40 MILLIGRAM(S): 40 TABLET, DELAYED RELEASE ORAL at 05:49

## 2024-11-26 RX ADMIN — POTASSIUM CHLORIDE 50 MILLIEQUIVALENT(S): 600 TABLET, EXTENDED RELEASE ORAL at 07:03

## 2024-11-26 RX ADMIN — POTASSIUM CHLORIDE 20 MILLIEQUIVALENT(S): 600 TABLET, EXTENDED RELEASE ORAL at 04:28

## 2024-11-26 RX ADMIN — METHADONE HYDROCHLORIDE 40 MILLIGRAM(S): 5 TABLET ORAL at 11:16

## 2024-11-26 RX ADMIN — POTASSIUM CHLORIDE 20 MILLIEQUIVALENT(S): 600 TABLET, EXTENDED RELEASE ORAL at 02:02

## 2024-11-26 RX ADMIN — CARVEDILOL 3.12 MILLIGRAM(S): 25 TABLET, FILM COATED ORAL at 17:47

## 2024-11-26 RX ADMIN — Medication 1 MILLIGRAM(S): at 11:16

## 2024-11-26 RX ADMIN — LACTULOSE 10 GRAM(S): 10 SOLUTION ORAL at 17:43

## 2024-11-26 RX ADMIN — Medication 100 MILLIGRAM(S): at 11:16

## 2024-11-26 RX ADMIN — POTASSIUM CHLORIDE 50 MILLIEQUIVALENT(S): 600 TABLET, EXTENDED RELEASE ORAL at 04:29

## 2024-11-26 RX ADMIN — Medication 25 GRAM(S): at 17:44

## 2024-11-26 RX ADMIN — ENOXAPARIN SODIUM 40 MILLIGRAM(S): 30 INJECTION SUBCUTANEOUS at 17:46

## 2024-11-26 RX ADMIN — FUROSEMIDE 40 MILLIGRAM(S): 40 TABLET ORAL at 05:49

## 2024-11-26 RX ADMIN — POTASSIUM CHLORIDE 50 MILLIEQUIVALENT(S): 600 TABLET, EXTENDED RELEASE ORAL at 02:02

## 2024-11-26 NOTE — PROGRESS NOTE ADULT - SUBJECTIVE AND OBJECTIVE BOX
SUBJECTIVE:    Patient is a 49y old Male who presents with a chief complaint of abdominal pain (25 Nov 2024 19:09)  Currently admitted to medicine with the primary diagnosis of Jaundice   Today is hospital day 6d. This morning he is resting comfortably in bed and reports no new issues or overnight events.     PAST MEDICAL & SURGICAL HISTORY  Cirrhosis    Opiate abuse, episodic    Moderate heroin dependence in sustained remission      SOCIAL HISTORY:  Negative for smoking/alcohol/drug use.     ALLERGIES:  No Known Allergies    MEDICATIONS:  STANDING MEDICATIONS  carvedilol 3.125 milliGRAM(s) Oral every 12 hours  enoxaparin Injectable 40 milliGRAM(s) SubCutaneous every 24 hours  folic acid 1 milliGRAM(s) Oral daily  furosemide    Tablet 40 milliGRAM(s) Oral daily  lactulose Syrup 10 Gram(s) Oral daily  methadone    Tablet 40 milliGRAM(s) Oral daily  pantoprazole    Tablet 40 milliGRAM(s) Oral before breakfast  thiamine 100 milliGRAM(s) Oral daily    PRN MEDICATIONS  LORazepam   Injectable 1 milliGRAM(s) IV Push every 1 hour PRN    VITALS:   T(F): 98  HR: 95  BP: 106/69  RR: 18  SpO2: 97%    LABS:                        10.4   8.38  )-----------( 71       ( 26 Nov 2024 01:12 )             31.2     11-26    137  |  100  |  11  ----------------------------<  79  4.1   |  28  |  1.0    Ca    8.4      26 Nov 2024 11:51  Mg     1.7     11-26    TPro  5.7[L]  /  Alb  2.9[L]  /  TBili  27.1[HH]  /  DBili  x   /  AST  193[H]  /  ALT  77[H]  /  AlkPhos  415[H]  11-26    PT/INR - ( 26 Nov 2024 05:10 )   PT: 19.40 sec;   INR: 1.63 ratio         PTT - ( 26 Nov 2024 05:10 )  PTT:36.8 sec  Urinalysis Basic - ( 26 Nov 2024 11:51 )    Color: x / Appearance: x / SG: x / pH: x  Gluc: 79 mg/dL / Ketone: x  / Bili: x / Urobili: x   Blood: x / Protein: x / Nitrite: x   Leuk Esterase: x / RBC: x / WBC x   Sq Epi: x / Non Sq Epi: x / Bacteria: x            Culture - Fungal, Body Fluid (collected 25 Nov 2024 15:54)  Source: Peritoneal  Preliminary Report (26 Nov 2024 07:58):    Testing in progress    Culture - Body Fluid with Gram Stain (collected 25 Nov 2024 15:54)  Source: Peritoneal  Gram Stain (26 Nov 2024 03:48):    No polymorphonuclear leukocytes seen    No organisms seen    by cytocentrifuge          RADIOLOGY:    PHYSICAL EXAM:  GEN: No acute distress  LUNGS: Clear to auscultation bilaterally   HEART: Regular  ABD: Soft, non-tender, non-distended.  EXT: NC/NC/NE/2+PP/GUAJARDO/Skin Intact.   NEURO: AAOX3    Intravenous access:   NG tube:   Felix Catheter:

## 2024-11-26 NOTE — CHART NOTE - NSCHARTNOTEFT_GEN_A_CORE
PACU ANESTHESIA ADMISSION NOTE      _x___  Patent Airway    __x__  Full return of protective reflexes    ___x_  Full recovery from anesthesia / back to baseline     Vitals:   T:     37.2      R:         15         BP:  91/58                Sat:     96              P: 96      Mental Status:  _x___ Awake   _____ Alert   _____ Drowsy   _____ Sedated    Nausea/Vomiting:  __x__ NO  ______Yes,   See Post - Op Orders          Pain Scale (0-10):  _____    Treatment: _x___ None    ____ See Post - Op/PCA Orders    Post - Operative Fluids:   ____ Oral   __x__ See Post - Op Orders    Plan: Discharge:   ____Home       ___x__Floor     _____Critical Care    _____  Other:_________________    Comments:  No anesthesia complications noted

## 2024-11-26 NOTE — PROGRESS NOTE ADULT - SUBJECTIVE AND OBJECTIVE BOX
Patient is a 49y old  Male who presents with a chief complaint of abdominal pain (25 Nov 2024 16:49)    INTERVAL HPI/OVERNIGHT EVENTS: Patient was examined and seen at bedside. This morning pt is resting comfortably in bed and reports no new issues or overnight events. No new complaints, feels ok. Reports decreased abdominal distention.  ROS: Denies CP, SOB, AP, new weakness  All other systems reviewed and are within normal limits.  InitialHPI:  A 48-year-old male patient with a history of chronic back pain and chronic lower extremity swelling presented to the emergency department today complaining of worsening abdominal pain for the past several days. He has a history of opiate and heroin abuse (non-IV drug use) and is currently prescribed methadone 40mg daily through [facility] ([phone]). He also has a history of alcohol abuse beginning in September 2023. While he reports a period of sobriety, he resumed drinking two months ago, consuming approximately two pints of vodka daily, with his last drink this morning. He denies any other recent drug use.    The patient was previously admitted to the ED on June 19th for sepsis secondary to colitis and was discharged on June 26th after receiving antibiotic treatment. Since his discharge, his abdominal pain and distension have progressively worsened, accompanied by nausea, decreased appetite, and vomiting bile at least twice daily. He denies experiencing tremors, insomnia, seizures, diarrhea, constipation, cough, sore throat, fevers, or chills. He also reports the onset of jaundice in early 2024 and was recently diagnosed with cirrhosis.    The patient's brother, Reji Vargas ([phone]), is a healthcare professional and can be contacted for further information.    As per patient he is on bunch of medication but stopped taking them since last few months.     vitally stable in Ed   On labs WBCs 11.77 e Neutrophil predom ,  , deranged coags ,and LFTs  elevated Total bili of 22.9 , bilirubin direct 16.7 , indirect bilirubin of 6.2 ,lipase of 107     On CT A/P : Diffuse colonic mural thickening, which may represent pancolitis in the   appropriate clinical setting. No abscess.  Marked hepatic steatosis., Nonspecific gallbladder distention.    rcvd cipro and flagyl in ed     admitted for further workup .  (20 Nov 2024 22:01)    PAST MEDICAL & SURGICAL HISTORY:  Cirrhosis      Opiate abuse, episodic      Moderate heroin dependence in sustained remission          General: NAD, AAO3, +jaundiced  HEENT:  EOMI, no LAD  CV: S1 S2  Resp: decreased breath sounds at bases  GI: NT/firm,, +distention; +BS, +telangiectasias,  MS: no clubbing/cyanosis/edema, + pulses b/l  Neuro: nonfocal, +reflexes thruout. No asterixis.          Home Medications:  methadone 10 mg oral tablet: 4 tab(s) orally once a day (20 Nov 2024 23:43)  multivitamin: 1 tab(s) once a day (20 Nov 2024 23:44)    MEDICATIONS  (STANDING):  carvedilol 3.125 milliGRAM(s) Oral every 12 hours  enoxaparin Injectable 40 milliGRAM(s) SubCutaneous every 24 hours  folic acid 1 milliGRAM(s) Oral daily  furosemide    Tablet 40 milliGRAM(s) Oral daily  lactulose Syrup 10 Gram(s) Oral daily  methadone    Tablet 40 milliGRAM(s) Oral daily  pantoprazole    Tablet 40 milliGRAM(s) Oral before breakfast  thiamine 100 milliGRAM(s) Oral daily  trimethoprim  160 mG/sulfamethoxazole 800 mG 1 Tablet(s) Oral daily    MEDICATIONS  (PRN):  LORazepam   Injectable 1 milliGRAM(s) IV Push every 1 hour PRN CIWA-Ar score 8 or greater    Vital Signs Last 24 Hrs  T(C): 36.7 (26 Nov 2024 16:16), Max: 37.2 (26 Nov 2024 14:26)  T(F): 98 (26 Nov 2024 16:16), Max: 99 (26 Nov 2024 14:26)  HR: 95 (26 Nov 2024 17:49) (71 - 95)  BP: 106/69 (26 Nov 2024 17:49) (91/56 - 108/72)  BP(mean): 82 (26 Nov 2024 17:49) (82 - 84)  RR: 18 (26 Nov 2024 16:31) (18 - 18)  SpO2: 97% (26 Nov 2024 16:31) (94% - 97%)    Parameters below as of 26 Nov 2024 16:31  Patient On (Oxygen Delivery Method): room air      CAPILLARY BLOOD GLUCOSE        LABS:                        10.4   8.38  )-----------( 71       ( 26 Nov 2024 01:12 )             31.2     11-26    137  |  100  |  11  ----------------------------<  79  4.1   |  28  |  1.0    Ca    8.4      26 Nov 2024 11:51  Mg     1.7     11-26    TPro  5.7[L]  /  Alb  2.9[L]  /  TBili  27.1[HH]  /  DBili  x   /  AST  193[H]  /  ALT  77[H]  /  AlkPhos  415[H]  11-26    LIVER FUNCTIONS - ( 26 Nov 2024 11:51 )  Alb: 2.9 g/dL / Pro: 5.7 g/dL / ALK PHOS: 415 U/L / ALT: 77 U/L / AST: 193 U/L / GGT: x               PT/INR - ( 26 Nov 2024 05:10 )   PT: 19.40 sec;   INR: 1.63 ratio         PTT - ( 26 Nov 2024 05:10 )  PTT:36.8 sec  Urinalysis Basic - ( 26 Nov 2024 11:51 )    Color: x / Appearance: x / SG: x / pH: x  Gluc: 79 mg/dL / Ketone: x  / Bili: x / Urobili: x   Blood: x / Protein: x / Nitrite: x   Leuk Esterase: x / RBC: x / WBC x   Sq Epi: x / Non Sq Epi: x / Bacteria: x              Culture - Fungal, Body Fluid (collected 25 Nov 2024 15:54)  Source: Peritoneal  Preliminary Report (26 Nov 2024 07:58):    Testing in progress    Culture - Body Fluid with Gram Stain (collected 25 Nov 2024 15:54)  Source: Peritoneal  Gram Stain (26 Nov 2024 03:48):    No polymorphonuclear leukocytes seen    No organisms seen    by cytocentrifuge  Preliminary Report (26 Nov 2024 18:05):    No growth      Consultant Notes Reviewed:  [x ] YES  [ ] NO  Care Discussed with Consultants/Other Providers/ Housestaff [ x] YES  [ ] NO  Radiology, labs, EKGs, new studies personally reviewed.                                                       Patient is a 49y old  Male who presents with a chief complaint of abdominal pain (25 Nov 2024 16:49)    INTERVAL HPI/OVERNIGHT EVENTS: Patient was examined and seen at bedside. This morning pt is resting comfortably in bed and reports no new issues or overnight events. No new complaints, feels well. Wants to go home.  ROS: Denies CP, SOB, AP, new weakness  All other systems reviewed and are within normal limits.  InitialHPI:  A 48-year-old male patient with a history of chronic back pain and chronic lower extremity swelling presented to the emergency department today complaining of worsening abdominal pain for the past several days. He has a history of opiate and heroin abuse (non-IV drug use) and is currently prescribed methadone 40mg daily through [facility] ([phone]). He also has a history of alcohol abuse beginning in September 2023. While he reports a period of sobriety, he resumed drinking two months ago, consuming approximately two pints of vodka daily, with his last drink this morning. He denies any other recent drug use.    The patient was previously admitted to the ED on June 19th for sepsis secondary to colitis and was discharged on June 26th after receiving antibiotic treatment. Since his discharge, his abdominal pain and distension have progressively worsened, accompanied by nausea, decreased appetite, and vomiting bile at least twice daily. He denies experiencing tremors, insomnia, seizures, diarrhea, constipation, cough, sore throat, fevers, or chills. He also reports the onset of jaundice in early 2024 and was recently diagnosed with cirrhosis.    The patient's brother, Reji Vargas ([phone]), is a healthcare professional and can be contacted for further information.    As per patient he is on bunch of medication but stopped taking them since last few months.     vitally stable in Ed   On labs WBCs 11.77 e Neutrophil predom ,  , deranged coags ,and LFTs  elevated Total bili of 22.9 , bilirubin direct 16.7 , indirect bilirubin of 6.2 ,lipase of 107     On CT A/P : Diffuse colonic mural thickening, which may represent pancolitis in the   appropriate clinical setting. No abscess.  Marked hepatic steatosis., Nonspecific gallbladder distention.    rcvd cipro and flagyl in ed     admitted for further workup .  (20 Nov 2024 22:01)    PAST MEDICAL & SURGICAL HISTORY:  Cirrhosis      Opiate abuse, episodic      Moderate heroin dependence in sustained remission          General: NAD, AAO3, +jaundiced  HEENT:  EOMI, no LAD  CV: S1 S2  Resp: decreased breath sounds at bases  GI: NT/firm,, +distention; +BS, +telangiectasias,  MS: no clubbing/cyanosis/edema, + pulses b/l  Neuro: nonfocal, +reflexes thruout. No asterixis.          Home Medications:  methadone 10 mg oral tablet: 4 tab(s) orally once a day (20 Nov 2024 23:43)  multivitamin: 1 tab(s) once a day (20 Nov 2024 23:44)    MEDICATIONS  (STANDING):  carvedilol 3.125 milliGRAM(s) Oral every 12 hours  chlorhexidine 2% Cloths 1 Application(s) Topical daily  chlorhexidine 2% Cloths 1 Application(s) Topical <User Schedule>  enoxaparin Injectable 40 milliGRAM(s) SubCutaneous every 24 hours  folic acid 1 milliGRAM(s) Oral daily  furosemide    Tablet 20 milliGRAM(s) Oral daily  lactulose Syrup 10 Gram(s) Oral two times a day  methadone    Tablet 40 milliGRAM(s) Oral daily  pantoprazole    Tablet 40 milliGRAM(s) Oral before breakfast  predniSONE   Tablet 40 milliGRAM(s) Oral daily  spironolactone 50 milliGRAM(s) Oral daily  thiamine 100 milliGRAM(s) Oral daily    MEDICATIONS  (PRN):  LORazepam   Injectable 1 milliGRAM(s) IV Push every 1 hour PRN CIWA-Ar score 8 or greater    Vital Signs Last 24 Hrs  T(C): 36.6 (27 Nov 2024 13:06), Max: 36.9 (26 Nov 2024 21:27)  T(F): 97.9 (27 Nov 2024 13:06), Max: 98.4 (26 Nov 2024 21:27)  HR: 70 (27 Nov 2024 13:06) (67 - 95)  BP: 100/69 (27 Nov 2024 13:06) (91/56 - 106/69)  BP(mean): 80 (27 Nov 2024 13:06) (78 - 82)  RR: 18 (27 Nov 2024 13:06) (16 - 18)  SpO2: 99% (27 Nov 2024 13:06) (94% - 99%)    Parameters below as of 27 Nov 2024 13:06  Patient On (Oxygen Delivery Method): room air      CAPILLARY BLOOD GLUCOSE        LABS:                        11.7   11.14 )-----------( 102      ( 27 Nov 2024 05:46 )             34.7     11-27    138  |  98  |  12  ----------------------------<  83  3.6   |  28  |  1.0    Ca    8.5      27 Nov 2024 05:46  Phos  3.4     11-27  Mg     2.2     11-27    TPro  5.5[L]  /  Alb  2.6[L]  /  TBili  26.1[HH]  /  DBili  x   /  AST  181[H]  /  ALT  72[H]  /  AlkPhos  393[H]  11-27    LIVER FUNCTIONS - ( 27 Nov 2024 05:46 )  Alb: 2.6 g/dL / Pro: 5.5 g/dL / ALK PHOS: 393 U/L / ALT: 72 U/L / AST: 181 U/L / GGT: x               PT/INR - ( 27 Nov 2024 05:46 )   PT: 17.30 sec;   INR: 1.45 ratio         PTT - ( 27 Nov 2024 05:46 )  PTT:40.8 sec  Urinalysis Basic - ( 27 Nov 2024 05:46 )    Color: x / Appearance: x / SG: x / pH: x  Gluc: 83 mg/dL / Ketone: x  / Bili: x / Urobili: x   Blood: x / Protein: x / Nitrite: x   Leuk Esterase: x / RBC: x / WBC x   Sq Epi: x / Non Sq Epi: x / Bacteria: x              Culture - Fungal, Body Fluid (collected 25 Nov 2024 15:54)  Source: Peritoneal  Preliminary Report (26 Nov 2024 07:58):    Testing in progress    Culture - Body Fluid with Gram Stain (collected 25 Nov 2024 15:54)  Source: Peritoneal  Gram Stain (26 Nov 2024 03:48):    No polymorphonuclear leukocytes seen    No organisms seen    by cytocentrifuge  Preliminary Report (26 Nov 2024 18:05):    No growth      Consultant Notes Reviewed:  [x ] YES  [ ] NO  Care Discussed with Consultants/Other Providers/ Housestaff [ x] YES  [ ] NO  Radiology, labs, EKGs, new studies personally reviewed.                                                                               Patient is a 49y old  Male who presents with a chief complaint of abdominal pain (25 Nov 2024 16:49)    INTERVAL HPI/OVERNIGHT EVENTS: Patient was examined and seen at bedside. This morning pt is resting comfortably in bed and reports no new issues or overnight events. No new complaints, feels well.   ROS: Denies CP, SOB, AP, new weakness  All other systems reviewed and are within normal limits.  InitialHPI:  A 48-year-old male patient with a history of chronic back pain and chronic lower extremity swelling presented to the emergency department today complaining of worsening abdominal pain for the past several days. He has a history of opiate and heroin abuse (non-IV drug use) and is currently prescribed methadone 40mg daily through [facility] ([phone]). He also has a history of alcohol abuse beginning in September 2023. While he reports a period of sobriety, he resumed drinking two months ago, consuming approximately two pints of vodka daily, with his last drink this morning. He denies any other recent drug use.    The patient was previously admitted to the ED on June 19th for sepsis secondary to colitis and was discharged on June 26th after receiving antibiotic treatment. Since his discharge, his abdominal pain and distension have progressively worsened, accompanied by nausea, decreased appetite, and vomiting bile at least twice daily. He denies experiencing tremors, insomnia, seizures, diarrhea, constipation, cough, sore throat, fevers, or chills. He also reports the onset of jaundice in early 2024 and was recently diagnosed with cirrhosis.    The patient's brother, Reji Vargas ([phone]), is a healthcare professional and can be contacted for further information.    As per patient he is on bunch of medication but stopped taking them since last few months.     vitally stable in Ed   On labs WBCs 11.77 e Neutrophil predom ,  , deranged coags ,and LFTs  elevated Total bili of 22.9 , bilirubin direct 16.7 , indirect bilirubin of 6.2 ,lipase of 107     On CT A/P : Diffuse colonic mural thickening, which may represent pancolitis in the   appropriate clinical setting. No abscess.  Marked hepatic steatosis., Nonspecific gallbladder distention.    rcvd cipro and flagyl in ed     admitted for further workup .  (20 Nov 2024 22:01)    PAST MEDICAL & SURGICAL HISTORY:  Cirrhosis      Opiate abuse, episodic      Moderate heroin dependence in sustained remission          General: NAD, AAO3, +jaundiced  HEENT:  EOMI, no LAD  CV: S1 S2  Resp: decreased breath sounds at bases  GI: NT/firm,, +distention; +BS, +telangiectasias,  MS: no clubbing/cyanosis/edema, + pulses b/l  Neuro: nonfocal, +reflexes thruout. No asterixis.          Home Medications:  methadone 10 mg oral tablet: 4 tab(s) orally once a day (20 Nov 2024 23:43)  multivitamin: 1 tab(s) once a day (20 Nov 2024 23:44)    MEDICATIONS  (STANDING):  carvedilol 3.125 milliGRAM(s) Oral every 12 hours  chlorhexidine 2% Cloths 1 Application(s) Topical daily  chlorhexidine 2% Cloths 1 Application(s) Topical <User Schedule>  enoxaparin Injectable 40 milliGRAM(s) SubCutaneous every 24 hours  folic acid 1 milliGRAM(s) Oral daily  furosemide    Tablet 20 milliGRAM(s) Oral daily  lactulose Syrup 10 Gram(s) Oral two times a day  methadone    Tablet 40 milliGRAM(s) Oral daily  pantoprazole    Tablet 40 milliGRAM(s) Oral before breakfast  predniSONE   Tablet 40 milliGRAM(s) Oral daily  spironolactone 50 milliGRAM(s) Oral daily  thiamine 100 milliGRAM(s) Oral daily    MEDICATIONS  (PRN):  LORazepam   Injectable 1 milliGRAM(s) IV Push every 1 hour PRN CIWA-Ar score 8 or greater    Vital Signs Last 24 Hrs  T(C): 36.6 (27 Nov 2024 13:06), Max: 36.9 (26 Nov 2024 21:27)  T(F): 97.9 (27 Nov 2024 13:06), Max: 98.4 (26 Nov 2024 21:27)  HR: 70 (27 Nov 2024 13:06) (67 - 95)  BP: 100/69 (27 Nov 2024 13:06) (91/56 - 106/69)  BP(mean): 80 (27 Nov 2024 13:06) (78 - 82)  RR: 18 (27 Nov 2024 13:06) (16 - 18)  SpO2: 99% (27 Nov 2024 13:06) (94% - 99%)    Parameters below as of 27 Nov 2024 13:06  Patient On (Oxygen Delivery Method): room air      CAPILLARY BLOOD GLUCOSE        LABS:                        11.7   11.14 )-----------( 102      ( 27 Nov 2024 05:46 )             34.7     11-27    138  |  98  |  12  ----------------------------<  83  3.6   |  28  |  1.0    Ca    8.5      27 Nov 2024 05:46  Phos  3.4     11-27  Mg     2.2     11-27    TPro  5.5[L]  /  Alb  2.6[L]  /  TBili  26.1[HH]  /  DBili  x   /  AST  181[H]  /  ALT  72[H]  /  AlkPhos  393[H]  11-27    LIVER FUNCTIONS - ( 27 Nov 2024 05:46 )  Alb: 2.6 g/dL / Pro: 5.5 g/dL / ALK PHOS: 393 U/L / ALT: 72 U/L / AST: 181 U/L / GGT: x               PT/INR - ( 27 Nov 2024 05:46 )   PT: 17.30 sec;   INR: 1.45 ratio         PTT - ( 27 Nov 2024 05:46 )  PTT:40.8 sec  Urinalysis Basic - ( 27 Nov 2024 05:46 )    Color: x / Appearance: x / SG: x / pH: x  Gluc: 83 mg/dL / Ketone: x  / Bili: x / Urobili: x   Blood: x / Protein: x / Nitrite: x   Leuk Esterase: x / RBC: x / WBC x   Sq Epi: x / Non Sq Epi: x / Bacteria: x              Culture - Fungal, Body Fluid (collected 25 Nov 2024 15:54)  Source: Peritoneal  Preliminary Report (26 Nov 2024 07:58):    Testing in progress    Culture - Body Fluid with Gram Stain (collected 25 Nov 2024 15:54)  Source: Peritoneal  Gram Stain (26 Nov 2024 03:48):    No polymorphonuclear leukocytes seen    No organisms seen    by cytocentrifuge  Preliminary Report (26 Nov 2024 18:05):    No growth      Consultant Notes Reviewed:  [x ] YES  [ ] NO  Care Discussed with Consultants/Other Providers/ Housestaff [ x] YES  [ ] NO  Radiology, labs, EKGs, new studies personally reviewed.

## 2024-11-26 NOTE — PROGRESS NOTE ADULT - ASSESSMENT
A 48-year-old male patient with a history of chronic back pain, opiate/heroin use on methadone, alcohol use disorder, cirrhosis  and chronic lower extremity swelling presented to the emergency department today complaining of worsening abdominal pain for the past several days. While he reports a period of sobriety, he resumed drinking two months ago, consuming approximately two pints of vodka daily, with his last drink this morning. He denies any other recent drug use.    # Decompensated liver cirrhosis  due to Etoh use disorder   #Varices    Suspected thiamine deficiency   - CT A/p: hepatic steatosis , gallbladder distension  - US abdomen showed diffuse ascites,   - CIWA protocol.  - f/u  CATCH consulted.   - Continue thiamine, folate, B12, multivitamin; takes at home.  - Monitor LFTs , bilirubin level - direct 15.4, total 20.2  - Hepatology consulted : awaiting paracentesis to start prednisone as MDF > 32, will hold on abx per ID.  - Abdomen MRI with MRCP protocol as per GI.  - EGD by GI tomorrow 11/26, NPO from midnight.  - Started on lactulose, 2 BM daily, continue to monitor  - INR  daily, 1.61 today.  - Paracentesis and fluid studies. Diuretics after paracentesis  - SBP primary prophylaxis with ciprofloxacin with low protein (awaiting response from hepatology as QTc prolonged)  - Recommend alcohol cessation. Continue multivitamins and folic acid supplements  - Continue CIWA protocol  - Outpatient follow up for HCC screening: US and AFP Q6 months  -  low salt high protein diet  11/26: EGD w/ varices. Started on Coreg as per hepatology. Awaiting plan from hepatology on steroids, SBP Px, further plan     # Acute Pancolitis   - SIRS negative   - CT A/P : Diffuse colonic mural thickening, which may represent pancolitis.  - Off ABx.  - colonoscopy as OP.     # Thrombocytopenia  likely due to liver cirrhosis   - Trend CBC for PLT   - Monitor for any sign of bleed  - Active Type and Screen    # Ascites due to liver cirrhosis and portal HTN    - Was on lasix 40 PO daily, but pt is noncompliant, resumed lasix 40 PO daily  -?Aldactone 100 daily.    #chronic back pain  #h/o opiate and heroin abuse w/o IVDU  Denies any current use   - On Methadone 40 mg daily     DVT ppx: Lovenox   GI ppx: PPI BID     #Progress Note Handoff  Pending: Clinical improvement and stability__x__EGD, MRI, Catch team  Pt/Family discussion: Pt/family informed and agree with the current plan  Disposition: Home__    My note supersedes the residents note should a discrepancy arise.    Chart and notes personally reviewed.  Care Discussed with Consultants/Other Providers/ Housestaff [ x] YES [ ] NO   Radiology, labs, old records personally reviewed.    discussed w/ housestaff, nursing, case management    Attestation Statements:    Attestation Statements:  Risk Statement (NON-critical care).     On this date of service, level of risk to patient is considered: High.     Due to: pt with illness causing risk to life or bodily fxn    Time-based billing (NON-critical care).     50 minutes spent on total encounter. The necessity of the time spent during the encounter on this date of service was due to:     time spent on review of labs, imaging studies, old records, obtaining history, personally examining patient, counselling and communicating with patient/ family, entering orders for medications/tests/etc, discussions with other health care providers, documentation in electronic health records, independent interpretation of labs, imaging/procedure results and care coordination.       A 48-year-old male patient with a history of chronic back pain, opiate/heroin use on methadone, alcohol use disorder, cirrhosis  and chronic lower extremity swelling presented to the emergency department today complaining of worsening abdominal pain for the past several days. While he reports a period of sobriety, he resumed drinking two months ago, consuming approximately two pints of vodka daily, with his last drink this morning. He denies any other recent drug use.    # Decompensated liver cirrhosis  due to Etoh use disorder   #Varices    Suspected thiamine deficiency   - CT A/p: hepatic steatosis , gallbladder distension  - US abdomen showed diffuse ascites,   - CIWA protocol.  - f/u  CATCH consulted.   - Continue thiamine, folate, B12, multivitamin; takes at home.  - Monitor LFTs , bilirubin level - direct 15.4, total 20.2  - Hepatology consulted : awaiting paracentesis to start prednisone as MDF > 32, will hold on abx per ID.  - Abdomen MRI with MRCP protocol as per GI.  - EGD by GI tomorrow 11/26, NPO from midnight.  - Started on lactulose, 2 BM daily, continue to monitor  - INR  daily, 1.61 today.  - Paracentesis and fluid studies. Diuretics after paracentesis  - SBP primary prophylaxis with ciprofloxacin with low protein (awaiting response from hepatology as QTc prolonged)  - Recommend alcohol cessation. Continue multivitamins and folic acid supplements  - Continue CIWA protocol  - Outpatient follow up for HCC screening: US and AFP Q6 months  -  low salt high protein diet  11/26: EGD w/ varices. Started on Coreg as per hepatology. Awaiting plan from hepatology on steroids, SBP Px, further plan  11/27 d/w hepatology attending: no need for SBP Px as risk outweigh benefit. Start Aldactone, Prednisone. Repeat Andres score on day 7. Stable for outpt f/u.     # Acute Pancolitis   - SIRS negative, a-Sx   - CT A/P : Diffuse colonic mural thickening, which may represent pancolitis.  - Off ABx.  - colonoscopy as OP.     # Thrombocytopenia  likely due to liver cirrhosis   - Trend CBC for PLT   - Monitor for any sign of bleed  - Active Type and Screen    # Ascites due to liver cirrhosis and portal HTN    - Was on lasix 40 PO daily, but pt is noncompliant, resumed lasix 40 PO daily  -Aldactone 100 daily.    #chronic back pain  #h/o opiate and heroin abuse w/o IVDU  Denies any current use   - On Methadone 40 mg daily     Discharge instructions discussed and patient knows when to seek immediate medical attention.  Patient has proper follow up.  All results discussed and patient aware they require further follow up/work up.  Stressed importance of proper follow up.  Medications prescribed and changes discussed.  All questions and concerns from patient addressed. Understanding of instructions verbalized.    My note supersedes the residents note should a discrepancy arise.    Chart and notes personally reviewed.  Care Discussed with Consultants/Other Providers/ Housestaff [ x] YES [ ] NO   Radiology, labs, old records personally reviewed.    discussed w/ housestaff, nursing, case management, Dr. Aleman    Time-based billing (NON-critical care).     35 minutes spent on total encounter. The necessity of the time spent during the encounter on this date of service was due to:     time spent on review of labs, imaging studies, old records, obtaining history, personally examining patient, counselling and communicating with patient/ family, entering orders for medications/tests/etc, discussions with other health care providers, documentation in electronic health records, independent interpretation of labs, imaging/procedure results and care coordination.       A 48-year-old male patient with a history of chronic back pain, opiate/heroin use on methadone, alcohol use disorder, cirrhosis  and chronic lower extremity swelling presented to the emergency department today complaining of worsening abdominal pain for the past several days. While he reports a period of sobriety, he resumed drinking two months ago, consuming approximately two pints of vodka daily, with his last drink this morning. He denies any other recent drug use.    # Decompensated liver cirrhosis  due to Etoh use disorder   #Varices    Suspected thiamine deficiency   - CT A/p: hepatic steatosis , gallbladder distension  - US abdomen showed diffuse ascites,   - CIWA protocol.  - f/u  CATCH consulted.   - Continue thiamine, folate, B12, multivitamin; takes at home.  - Monitor LFTs , bilirubin level - direct 15.4, total 20.2  - Hepatology consulted : awaiting paracentesis to start prednisone as MDF > 32, will hold on abx per ID.  - Abdomen MRI with MRCP protocol as per GI.  - EGD by GI tomorrow 11/26, NPO from midnight.  - Started on lactulose, 2 BM daily, continue to monitor  - INR  daily, 1.61 today.  - Paracentesis and fluid studies. Diuretics after paracentesis  - SBP primary prophylaxis with ciprofloxacin with low protein (awaiting response from hepatology as QTc prolonged)  - Recommend alcohol cessation. Continue multivitamins and folic acid supplements  - Continue CIWA protocol  - Outpatient follow up for HCC screening: US and AFP Q6 months  -  low salt high protein diet  11/26: EGD w/ varices. Started on Coreg as per hepatology. Awaiting plan from hepatology on steroids, SBP Px, further plan     # Acute Pancolitis   - SIRS negative, a-Sx   - CT A/P : Diffuse colonic mural thickening, which may represent pancolitis.  - Off ABx.  - colonoscopy as OP.     # Thrombocytopenia  likely due to liver cirrhosis   - Trend CBC for PLT   - Monitor for any sign of bleed  - Active Type and Screen    # Ascites due to liver cirrhosis and portal HTN    - Was on lasix 40 PO daily, but pt is noncompliant, resumed lasix 40 PO daily  -Aldactone 100 daily.    #chronic back pain  #h/o opiate and heroin abuse w/o IVDU  Denies any current use   - On Methadone 40 mg daily         My note supersedes the residents note should a discrepancy arise.    Chart and notes personally reviewed.  Care Discussed with Consultants/Other Providers/ Housestaff [ x] YES [ ] NO   Radiology, labs, old records personally reviewed.    discussed w/ housestaff, nursing, case managemen    Time-based billing (NON-critical care).     50 minutes spent on total encounter. The necessity of the time spent during the encounter on this date of service was due to:     time spent on review of labs, imaging studies, old records, obtaining history, personally examining patient, counselling and communicating with patient/ family, entering orders for medications/tests/etc, discussions with other health care providers, documentation in electronic health records, independent interpretation of labs, imaging/procedure results and care coordination.

## 2024-11-26 NOTE — PROGRESS NOTE ADULT - ASSESSMENT
A 48-year-old male patient with a history of chronic back pain, opiate/heroin use on methadone, alcohol use disorder, cirrhosis  and chronic lower extremity swelling presented to the emergency department today complaining of worsening abdominal pain for the past several days. While he reports a period of sobriety, he resumed drinking two months ago, consuming approximately two pints of vodka daily, with his last drink this morning. He denies any other recent drug use.    # Decompensated liver cirrhosis  due to Etoh use disorder     Suspected thiamine deficiency   - CT A/p: hepatic steatosis , gallbladder distension  - US abdomen showed diffuse ascites,   - Paracentesis with procedure team 11/21: unsuccessful >> Para today by IR 11/25.  - CIWA protocol.  - f/u  CATCH consulted.   - Continue thiamine, folate, B12, multivitamin; takes at home.  - Monitor LFTs , bilirubin level - direct 15.4, total 20.2  - Hepatology consulted : awaiting recs to start prednisone as MDF > 32, will hold on abx per ID.  - Abdomen MRI with MRCP: noted.  - EGD by GI done today 11/26: small varices in the lower third of esophagus.  - Coreg BID as per GI.  - Bactrim for SBP prophylaxis.  - c/w lactulose, 2 BM daily, continue to monitor  - INR  daily, 1.61 today.     # Acute Pancolitis   - SIRS negative   - CT A/P : Diffuse colonic mural thickening, which may represent pancolitis.  - Off ABx.  - Consider colonoscopy as OP.     # Thrombocytopenia  likely due to liver cirrhosis   -   - Trend CBC for PLT   - Monitor for any sign of bleed  - Active Type and Screen    # LE edema/ascites due to liver cirrhosis and portal HTN   - L>R with 2+ pitting edema .   - Was on lasix 40 PO daily, but pt is noncompliant, resumed lasix 40 PO daily.    #chronic back pain  #h/o opiate and heroin abuse w/o IVDU  Denies any current use   - On Methadone 40 mg daily     DVT ppx: Lovenox (held 11/25 AM dose)  GI ppx: PPI BID     Handoff:  - GI recs for prednisone and aldactone.

## 2024-11-27 ENCOUNTER — TRANSCRIPTION ENCOUNTER (OUTPATIENT)
Age: 49
End: 2024-11-27

## 2024-11-27 LAB
ALBUMIN SERPL ELPH-MCNC: 2.6 G/DL — LOW (ref 3.5–5.2)
ALP SERPL-CCNC: 393 U/L — HIGH (ref 30–115)
ALT FLD-CCNC: 72 U/L — HIGH (ref 0–41)
ANION GAP SERPL CALC-SCNC: 12 MMOL/L — SIGNIFICANT CHANGE UP (ref 7–14)
APTT BLD: 40.8 SEC — HIGH (ref 27–39.2)
AST SERPL-CCNC: 181 U/L — HIGH (ref 0–41)
BASOPHILS # BLD AUTO: 0.07 K/UL — SIGNIFICANT CHANGE UP (ref 0–0.2)
BASOPHILS NFR BLD AUTO: 0.6 % — SIGNIFICANT CHANGE UP (ref 0–1)
BILIRUB SERPL-MCNC: 26.1 MG/DL — CRITICAL HIGH (ref 0.2–1.2)
BUN SERPL-MCNC: 12 MG/DL — SIGNIFICANT CHANGE UP (ref 10–20)
CALCIUM SERPL-MCNC: 8.5 MG/DL — SIGNIFICANT CHANGE UP (ref 8.4–10.5)
CHLORIDE SERPL-SCNC: 98 MMOL/L — SIGNIFICANT CHANGE UP (ref 98–110)
CO2 SERPL-SCNC: 28 MMOL/L — SIGNIFICANT CHANGE UP (ref 17–32)
CREAT SERPL-MCNC: 1 MG/DL — SIGNIFICANT CHANGE UP (ref 0.7–1.5)
EGFR: 92 ML/MIN/1.73M2 — SIGNIFICANT CHANGE UP
EOSINOPHIL # BLD AUTO: 0.33 K/UL — SIGNIFICANT CHANGE UP (ref 0–0.7)
EOSINOPHIL NFR BLD AUTO: 3 % — SIGNIFICANT CHANGE UP (ref 0–8)
GLUCOSE SERPL-MCNC: 83 MG/DL — SIGNIFICANT CHANGE UP (ref 70–99)
HCT VFR BLD CALC: 34.7 % — LOW (ref 42–52)
HGB BLD-MCNC: 11.7 G/DL — LOW (ref 14–18)
IMM GRANULOCYTES NFR BLD AUTO: 1.3 % — HIGH (ref 0.1–0.3)
INR BLD: 1.45 RATIO — HIGH (ref 0.65–1.3)
LYMPHOCYTES # BLD AUTO: 1.49 K/UL — SIGNIFICANT CHANGE UP (ref 1.2–3.4)
LYMPHOCYTES # BLD AUTO: 13.4 % — LOW (ref 20.5–51.1)
MAGNESIUM SERPL-MCNC: 2.2 MG/DL — SIGNIFICANT CHANGE UP (ref 1.8–2.4)
MCHC RBC-ENTMCNC: 33.7 G/DL — SIGNIFICANT CHANGE UP (ref 32–37)
MCHC RBC-ENTMCNC: 40.6 PG — HIGH (ref 27–31)
MCV RBC AUTO: 120.5 FL — HIGH (ref 80–94)
MONOCYTES # BLD AUTO: 1.03 K/UL — HIGH (ref 0.1–0.6)
MONOCYTES NFR BLD AUTO: 9.2 % — SIGNIFICANT CHANGE UP (ref 1.7–9.3)
NEUTROPHILS # BLD AUTO: 8.08 K/UL — HIGH (ref 1.4–6.5)
NEUTROPHILS NFR BLD AUTO: 72.5 % — SIGNIFICANT CHANGE UP (ref 42.2–75.2)
NRBC # BLD: 0 /100 WBCS — SIGNIFICANT CHANGE UP (ref 0–0)
PHOSPHATE SERPL-MCNC: 3.4 MG/DL — SIGNIFICANT CHANGE UP (ref 2.1–4.9)
PLATELET # BLD AUTO: 102 K/UL — LOW (ref 130–400)
PMV BLD: 12.5 FL — HIGH (ref 7.4–10.4)
POTASSIUM SERPL-MCNC: 3.6 MMOL/L — SIGNIFICANT CHANGE UP (ref 3.5–5)
POTASSIUM SERPL-SCNC: 3.6 MMOL/L — SIGNIFICANT CHANGE UP (ref 3.5–5)
PROT SERPL-MCNC: 5.5 G/DL — LOW (ref 6–8)
PROTHROM AB SERPL-ACNC: 17.3 SEC — HIGH (ref 9.95–12.87)
RBC # BLD: 2.88 M/UL — LOW (ref 4.7–6.1)
RBC # FLD: 17.7 % — HIGH (ref 11.5–14.5)
SODIUM SERPL-SCNC: 138 MMOL/L — SIGNIFICANT CHANGE UP (ref 135–146)
WBC # BLD: 11.14 K/UL — HIGH (ref 4.8–10.8)
WBC # FLD AUTO: 11.14 K/UL — HIGH (ref 4.8–10.8)

## 2024-11-27 PROCEDURE — 99233 SBSQ HOSP IP/OBS HIGH 50: CPT

## 2024-11-27 PROCEDURE — 99232 SBSQ HOSP IP/OBS MODERATE 35: CPT

## 2024-11-27 RX ORDER — SPIRONOLACTONE 25 MG
1 TABLET ORAL
Qty: 30 | Refills: 1
Start: 2024-11-27 | End: 2025-01-25

## 2024-11-27 RX ORDER — FUROSEMIDE 40 MG/1
1 TABLET ORAL
Qty: 30 | Refills: 1
Start: 2024-11-27 | End: 2025-01-25

## 2024-11-27 RX ORDER — PREDNISONE 20 MG/1
40 TABLET ORAL DAILY
Refills: 0 | Status: DISCONTINUED | OUTPATIENT
Start: 2024-11-27 | End: 2024-11-28

## 2024-11-27 RX ORDER — CARVEDILOL 25 MG/1
1 TABLET, FILM COATED ORAL
Qty: 60 | Refills: 1
Start: 2024-11-27 | End: 2025-01-25

## 2024-11-27 RX ORDER — LACTULOSE 10 G/15ML
10 SOLUTION ORAL
Refills: 0 | Status: DISCONTINUED | OUTPATIENT
Start: 2024-11-27 | End: 2024-11-28

## 2024-11-27 RX ORDER — GLUCOSAMINE SULFATE DIPOT CHLR 500 MG
1 CAPSULE ORAL
Qty: 30 | Refills: 3
Start: 2024-11-27 | End: 2025-03-26

## 2024-11-27 RX ORDER — CHLORHEXIDINE GLUCONATE 1.2 MG/ML
1 RINSE ORAL DAILY
Refills: 0 | Status: DISCONTINUED | OUTPATIENT
Start: 2024-11-27 | End: 2024-11-28

## 2024-11-27 RX ORDER — SPIRONOLACTONE 25 MG
50 TABLET ORAL DAILY
Refills: 0 | Status: DISCONTINUED | OUTPATIENT
Start: 2024-11-27 | End: 2024-11-27

## 2024-11-27 RX ORDER — PANTOPRAZOLE SODIUM 40 MG/1
1 TABLET, DELAYED RELEASE ORAL
Qty: 30 | Refills: 0
Start: 2024-11-27 | End: 2024-12-26

## 2024-11-27 RX ORDER — LANOLIN ALCOHOL/MO/W.PET/CERES
1 CREAM (GRAM) TOPICAL
Qty: 30 | Refills: 3
Start: 2024-11-27 | End: 2025-03-26

## 2024-11-27 RX ORDER — FUROSEMIDE 40 MG/1
20 TABLET ORAL DAILY
Refills: 0 | Status: DISCONTINUED | OUTPATIENT
Start: 2024-11-27 | End: 2024-11-28

## 2024-11-27 RX ORDER — LACTULOSE 10 G/15ML
15 SOLUTION ORAL
Qty: 900 | Refills: 1
Start: 2024-11-27 | End: 2025-01-25

## 2024-11-27 RX ORDER — SPIRONOLACTONE 25 MG
50 TABLET ORAL DAILY
Refills: 0 | Status: DISCONTINUED | OUTPATIENT
Start: 2024-11-27 | End: 2024-11-28

## 2024-11-27 RX ORDER — CHLORHEXIDINE GLUCONATE 1.2 MG/ML
1 RINSE ORAL
Refills: 0 | Status: DISCONTINUED | OUTPATIENT
Start: 2024-11-27 | End: 2024-11-28

## 2024-11-27 RX ADMIN — CHLORHEXIDINE GLUCONATE 1 APPLICATION(S): 1.2 RINSE ORAL at 12:11

## 2024-11-27 RX ADMIN — ENOXAPARIN SODIUM 40 MILLIGRAM(S): 30 INJECTION SUBCUTANEOUS at 18:02

## 2024-11-27 RX ADMIN — LACTULOSE 10 GRAM(S): 10 SOLUTION ORAL at 18:01

## 2024-11-27 RX ADMIN — CARVEDILOL 3.12 MILLIGRAM(S): 25 TABLET, FILM COATED ORAL at 05:41

## 2024-11-27 RX ADMIN — Medication 1 MILLIGRAM(S): at 12:07

## 2024-11-27 RX ADMIN — PANTOPRAZOLE SODIUM 40 MILLIGRAM(S): 40 TABLET, DELAYED RELEASE ORAL at 05:41

## 2024-11-27 RX ADMIN — Medication 100 MILLIGRAM(S): at 12:07

## 2024-11-27 RX ADMIN — Medication 50 MILLIGRAM(S): at 22:58

## 2024-11-27 RX ADMIN — Medication 1 TABLET(S): at 12:07

## 2024-11-27 RX ADMIN — LACTULOSE 10 GRAM(S): 10 SOLUTION ORAL at 12:07

## 2024-11-27 RX ADMIN — FUROSEMIDE 20 MILLIGRAM(S): 40 TABLET ORAL at 18:01

## 2024-11-27 RX ADMIN — CARVEDILOL 3.12 MILLIGRAM(S): 25 TABLET, FILM COATED ORAL at 18:02

## 2024-11-27 RX ADMIN — FUROSEMIDE 40 MILLIGRAM(S): 40 TABLET ORAL at 05:41

## 2024-11-27 RX ADMIN — METHADONE HYDROCHLORIDE 40 MILLIGRAM(S): 5 TABLET ORAL at 12:06

## 2024-11-27 RX ADMIN — PREDNISONE 40 MILLIGRAM(S): 20 TABLET ORAL at 18:02

## 2024-11-27 NOTE — DISCHARGE NOTE PROVIDER - NSDCFUSCHEDAPPT_GEN_ALL_CORE_FT
[facility] PreAdmits  Scheduled Appointment: 12/04/2024    [facility] Physician [facility]  INTMED  [address]  Scheduled Appointment: 12/04/2024    [facility] PreAdmits  Scheduled Appointment: 12/06/2024    [facility] Physician [facility]  PSYCHIATRY  [address]  Scheduled Appointment: 12/06/2024

## 2024-11-27 NOTE — PROGRESS NOTE ADULT - SUBJECTIVE AND OBJECTIVE BOX
Gastroenterology progress note:     Patient is a 49y old  Male who presents with a chief complaint of abdominal pain (27 Nov 2024 15:15)       Admitted on: 11-20-24    We are following the patient for: alcoholic hepatitis       Interval History: s/p EGD yesterday, pending dc today      PAST MEDICAL & SURGICAL HISTORY:  Cirrhosis      Opiate abuse, episodic      Moderate heroin dependence in sustained remission          MEDICATIONS  (STANDING):  carvedilol 3.125 milliGRAM(s) Oral every 12 hours  chlorhexidine 2% Cloths 1 Application(s) Topical daily  chlorhexidine 2% Cloths 1 Application(s) Topical <User Schedule>  enoxaparin Injectable 40 milliGRAM(s) SubCutaneous every 24 hours  folic acid 1 milliGRAM(s) Oral daily  furosemide    Tablet 20 milliGRAM(s) Oral daily  lactulose Syrup 10 Gram(s) Oral two times a day  methadone    Tablet 40 milliGRAM(s) Oral daily  pantoprazole    Tablet 40 milliGRAM(s) Oral before breakfast  predniSONE   Tablet 40 milliGRAM(s) Oral daily  spironolactone 50 milliGRAM(s) Oral daily  thiamine 100 milliGRAM(s) Oral daily    MEDICATIONS  (PRN):  LORazepam   Injectable 1 milliGRAM(s) IV Push every 1 hour PRN CIWA-Ar score 8 or greater      Allergies  No Known Allergies      Review of Systems:   Cardiovascular:  No Chest Pain, No Palpitations  Respiratory:  No Cough, No Dyspnea  Gastrointestinal:  As described in HPI  Skin:  No Skin Lesions, No Jaundice  Neuro:  No Syncope, No Dizziness    Physical Examination:  T(C): 36.6 (11-27-24 @ 13:06), Max: 36.9 (11-26-24 @ 21:27)  HR: 70 (11-27-24 @ 13:06) (67 - 95)  BP: 100/69 (11-27-24 @ 13:06) (100/69 - 106/69)  RR: 18 (11-27-24 @ 13:06) (16 - 18)  SpO2: 99% (11-27-24 @ 13:06) (99% - 99%)        GENERAL: AAOx3, no acute distress.  HEAD:  Atraumatic, Normocephalic  EYES: conjunctiva and sclera clear  NECK: Supple, no JVD or thyromegaly  CHEST/LUNG: Clear to auscultation bilaterally; No wheeze, rhonchi, or rales  HEART: Regular rate and rhythm; normal S1, S2, No murmurs.  ABDOMEN: Soft, nontender, nondistended; Bowel sounds present  NEUROLOGY: No asterixis or tremor.   SKIN:  jaundice +     Data:                        11.7   11.14 )-----------( 102      ( 27 Nov 2024 05:46 )             34.7     Hgb trend:  11.7  11-27-24 @ 05:46  10.4  11-26-24 @ 01:12  10.2  11-25-24 @ 20:00  10.4  11-25-24 @ 05:48      11-27    138  |  98  |  12  ----------------------------<  83  3.6   |  28  |  1.0    Ca    8.5      27 Nov 2024 05:46  Phos  3.4     11-27  Mg     2.2     11-27    TPro  5.5[L]  /  Alb  2.6[L]  /  TBili  26.1[HH]  /  DBili  x   /  AST  181[H]  /  ALT  72[H]  /  AlkPhos  393[H]  11-27    Liver panel trend:  TBili 26.1   /      /   ALT 72   /   AlkP 393   /   Tptn 5.5   /   Alb 2.6    /   DBili --      11-27  TBili 27.1   /      /   ALT 77   /   AlkP 415   /   Tptn 5.7   /   Alb 2.9    /   DBili --      11-26  TBili 22.6   /      /   ALT 73   /   AlkP 373   /   Tptn 4.9   /   Alb 2.5    /   DBili --      11-25  TBili 25.8   /      /   ALT 81   /   AlkP 420   /   Tptn 5.4   /   Alb 2.8    /   DBili --      11-25  TBili 24.3   /      /   ALT 85   /   AlkP 402   /   Tptn 5.2   /   Alb 2.7    /   DBili --      11-24  TBili 25.0   /      /   ALT 88   /   AlkP 424   /   Tptn 5.2   /   Alb 2.7    /   DBili --      11-24  TBili 25.7   /      /      /   AlkP 464   /   Tptn 5.7   /   Alb 3.0    /   DBili --      11-23  TBili 20.8   /      /      /   AlkP 467   /   Tptn 5.4   /   Alb 2.7    /   DBili --      11-22  TBili 22.9   /      /      /   AlkP 524   /   Tptn 5.9   /   Alb 3.0    /   DBili >17.2      11-21  TBili 21.9   /      /      /   AlkP 509   /   Tptn 5.8   /   Alb 2.9    /   DBili 16.2      11-21  TBili 20.2   /      /      /   AlkP 491   /   Tptn 5.6   /   Alb 2.9    /   DBili 15.4      11-21  TBili 22.9   /      /      /   AlkP 565   /   Tptn 6.2   /   Alb 3.2    /   DBili 16.7      11-20      PT/INR - ( 27 Nov 2024 05:46 )   PT: 17.30 sec;   INR: 1.45 ratio         PTT - ( 27 Nov 2024 05:46 )  PTT:40.8 sec    Culture - Fungal, Body Fluid (collected 25 Nov 2024 15:54)  Source: Peritoneal  Preliminary Report (26 Nov 2024 07:58):    Testing in progress    Culture - Body Fluid with Gram Stain (collected 25 Nov 2024 15:54)  Source: Peritoneal  Gram Stain (26 Nov 2024 03:48):    No polymorphonuclear leukocytes seen    No organisms seen    by cytocentrifuge  Preliminary Report (26 Nov 2024 18:05):    No growth         Radiology:      MR Abdomen w/wo IV Cont:   ACC: [id number] EXAM:  MR ABDOMEN WAW IC   ORDERED BY: ANNIKA DOMINIQUE     PROCEDURE DATE:  11/26/2024          INTERPRETATION:  CLINICAL INFORMATION: Abdominal pain, evaluate for mass   or obstruction    COMPARISON: Correlation with CT abdomen pelvis 11/20/2024    CONTRAST/COMPLICATIONS:  IV Contrast: Gadavist  8 cc administered   2 cc discarded  Oral Contrast: NONE      PROCEDURE:  MRI of the abdomen was performed.  MRCP was performed.    FINDINGS:  Several sequences are degraded by motion artifact which limits assessment.  LOWER CHEST: Within normal limits.    LIVER: Marked diffuse hepatic steatosis with areas of fatty sparing in   the left hepatic lobe and gallbladder fossa. Hepatomegaly measuring 21   cm. No focal hepatic lesion identified.    BILE DUCTS: No intra or extrahepatic biliary ductal dilation.   Questionable linear filling defect in the proximal CBD, may reflect   artifact versus sludge (2/25, 16/57).  GALLBLADDER: Gallbladder sludge. Mildly thickened/edematous gallbladder   wall.  SPLEEN: Within normal limits.  PANCREAS: Within normal limits.  ADRENALS: Within normal limits.  KIDNEYS/URETERS: Within normal limits.    VISUALIZED PORTIONS:  BOWEL: Within normal limits.  PERITONEUM: Moderate volume ascites.  VESSELS: Within normal limits.  RETROPERITONEUM/LYMPH NODES: No lymphadenopathy.  ABDOMINAL WALL: Within normal limits.  BONES: Degenerative changes within the spine.    IMPRESSION:  1.  Marked diffuse hepatic steatosis and mild hepatomegaly.  2.  Gallbladder sludge; questionable linear filling defect in the   proximal CBD may reflect artifact versus biliary sludge. No evidence of   intra or extrahepatic biliary ductal dilation.  3.  Moderate volume ascites.      --- End of Report ---          LUIS BRIAN MD;Resident Radiologist  This document has been electronically signed.  DILIP PANIAGUA MD; Attending Radiologist  This document has been electronically signed. Nov 26 2024  1:37PM (11-26-24 @ 10:23)

## 2024-11-27 NOTE — DISCHARGE NOTE PROVIDER - PROVIDER TOKENS
PROVIDER:[TOKEN:[[id number]],FOLLOWUP:[1 week]],PROVIDER:[TOKEN:[[id number]],FOLLOWUP:[1 week]] PROVIDER:[TOKEN:[[id number]],FOLLOWUP:[1 week]],PROVIDER:[TOKEN:[[id number]],SCHEDULEDAPPT:[12/04/2024],SCHEDULEDAPPTTIME:[01:00 PM]]

## 2024-11-27 NOTE — PROGRESS NOTE ADULT - ASSESSMENT
48 year old male patient with chronic back pain, history of opiate use on methadone, ethanol abuse and hepatic steatosis who presented to the ED on 06/19 for worsening abdominal distention and pain in setting of recent jaundice and dark urine, found to have elevated LFTs and concern for cirrhosis and worsened ascites. We are consulted for concern of alcohol hepatitis and cirrhosis.    #Severe Alcoholic hepatitis with Jaundice- MDF >32  #Suspected Decompensated MAHOGANY Cirrhosis in Setting of Radiological and Clinical Evidence (CHILD C)  #Portal HTN with Moderate Ascites s/p Tap 11/25  * Hemodynamically stable  * exam with abdominal distention   * RUQ US with moderate ascites   * s/p paracentesis on 06/30/2024: 3.2 L, SAAG 1.2, Tptn<1, no SBP   - s/p para 11/25: 2.5L, no SBP  - s/p EGD 11/26: small varices noted, procedure aborted due to solid food in stomach  - MRI abd: no focal liver lesions, mod ascites, CBD sludge    RECS  - For Ascites: Recommend 40mg lasix once daily, 100mg aldactone daily, monitor electrolytes  - Recommend coreg 3.125mg BID , target HR 55-65bpm for varices  - Monitor BM and avoid constipation. Continue lactulose 20 g Q8h (pt on methadone as well)  - SBP primary prophylaxis with ciprofloxacin with low protein (cannot be given, can hold off for now)  - Recommend prednisone 40mg daily for alcoholic hepatitis  - Recommend alcohol cessation. Continue multivitamins and folic acid supplements    - Outpatient follow up for HCC screening: US and AFP Q6 months  - Recommend low salt high protein diet  - LT evaluation: has brothers as social support; not marries; lives alone; no kids  - Follow up with our [facility] located at [address] [place] [address], [id number]. Telephone No: [phone] in 1 week

## 2024-11-27 NOTE — DISCHARGE NOTE PROVIDER - CARE PROVIDERS DIRECT ADDRESSES
,DirectAddress_Unknown,[email or web address] ,[email or web address]

## 2024-11-27 NOTE — PROGRESS NOTE ADULT - SUBJECTIVE AND OBJECTIVE BOX
Patient is a 49y old  Male who presents with a chief complaint of abdominal pain (25 Nov 2024 16:49)    INTERVAL HPI/OVERNIGHT EVENTS: Patient was examined and seen at bedside. This morning pt is resting comfortably in bed and reports no new issues or overnight events. No new complaints, feels well. Wants to go home.  ROS: Denies CP, SOB, AP, new weakness  All other systems reviewed and are within normal limits.  InitialHPI:  A 48-year-old male patient with a history of chronic back pain and chronic lower extremity swelling presented to the emergency department today complaining of worsening abdominal pain for the past several days. He has a history of opiate and heroin abuse (non-IV drug use) and is currently prescribed methadone 40mg daily through [facility] ([phone]). He also has a history of alcohol abuse beginning in September 2023. While he reports a period of sobriety, he resumed drinking two months ago, consuming approximately two pints of vodka daily, with his last drink this morning. He denies any other recent drug use.    The patient was previously admitted to the ED on June 19th for sepsis secondary to colitis and was discharged on June 26th after receiving antibiotic treatment. Since his discharge, his abdominal pain and distension have progressively worsened, accompanied by nausea, decreased appetite, and vomiting bile at least twice daily. He denies experiencing tremors, insomnia, seizures, diarrhea, constipation, cough, sore throat, fevers, or chills. He also reports the onset of jaundice in early 2024 and was recently diagnosed with cirrhosis.    The patient's brother, Reji Vargas ([phone]), is a healthcare professional and can be contacted for further information.    As per patient he is on bunch of medication but stopped taking them since last few months.     vitally stable in Ed   On labs WBCs 11.77 e Neutrophil predom ,  , deranged coags ,and LFTs  elevated Total bili of 22.9 , bilirubin direct 16.7 , indirect bilirubin of 6.2 ,lipase of 107     On CT A/P : Diffuse colonic mural thickening, which may represent pancolitis in the   appropriate clinical setting. No abscess.  Marked hepatic steatosis., Nonspecific gallbladder distention.    rcvd cipro and flagyl in ed     admitted for further workup .  (20 Nov 2024 22:01)    PAST MEDICAL & SURGICAL HISTORY:  Cirrhosis      Opiate abuse, episodic      Moderate heroin dependence in sustained remission          General: NAD, AAO3, +jaundiced  HEENT:  EOMI, no LAD  CV: S1 S2  Resp: decreased breath sounds at bases  GI: NT/firm,, +distention; +BS, +telangiectasias,  MS: no clubbing/cyanosis/edema, + pulses b/l  Neuro: nonfocal, +reflexes thruout. No asterixis.          Home Medications:  methadone 10 mg oral tablet: 4 tab(s) orally once a day (20 Nov 2024 23:43)  multivitamin: 1 tab(s) once a day (20 Nov 2024 23:44)    MEDICATIONS  (STANDING):  carvedilol 3.125 milliGRAM(s) Oral every 12 hours  chlorhexidine 2% Cloths 1 Application(s) Topical daily  chlorhexidine 2% Cloths 1 Application(s) Topical <User Schedule>  enoxaparin Injectable 40 milliGRAM(s) SubCutaneous every 24 hours  folic acid 1 milliGRAM(s) Oral daily  furosemide    Tablet 20 milliGRAM(s) Oral daily  lactulose Syrup 10 Gram(s) Oral two times a day  methadone    Tablet 40 milliGRAM(s) Oral daily  pantoprazole    Tablet 40 milliGRAM(s) Oral before breakfast  predniSONE   Tablet 40 milliGRAM(s) Oral daily  spironolactone 50 milliGRAM(s) Oral daily  thiamine 100 milliGRAM(s) Oral daily    MEDICATIONS  (PRN):  LORazepam   Injectable 1 milliGRAM(s) IV Push every 1 hour PRN CIWA-Ar score 8 or greater    Vital Signs Last 24 Hrs  T(C): 36.6 (27 Nov 2024 13:06), Max: 36.9 (26 Nov 2024 21:27)  T(F): 97.9 (27 Nov 2024 13:06), Max: 98.4 (26 Nov 2024 21:27)  HR: 70 (27 Nov 2024 13:06) (67 - 95)  BP: 100/69 (27 Nov 2024 13:06) (91/56 - 106/69)  BP(mean): 80 (27 Nov 2024 13:06) (78 - 82)  RR: 18 (27 Nov 2024 13:06) (16 - 18)  SpO2: 99% (27 Nov 2024 13:06) (94% - 99%)    Parameters below as of 27 Nov 2024 13:06  Patient On (Oxygen Delivery Method): room air      CAPILLARY BLOOD GLUCOSE        LABS:                        11.7   11.14 )-----------( 102      ( 27 Nov 2024 05:46 )             34.7     11-27    138  |  98  |  12  ----------------------------<  83  3.6   |  28  |  1.0    Ca    8.5      27 Nov 2024 05:46  Phos  3.4     11-27  Mg     2.2     11-27    TPro  5.5[L]  /  Alb  2.6[L]  /  TBili  26.1[HH]  /  DBili  x   /  AST  181[H]  /  ALT  72[H]  /  AlkPhos  393[H]  11-27    LIVER FUNCTIONS - ( 27 Nov 2024 05:46 )  Alb: 2.6 g/dL / Pro: 5.5 g/dL / ALK PHOS: 393 U/L / ALT: 72 U/L / AST: 181 U/L / GGT: x               PT/INR - ( 27 Nov 2024 05:46 )   PT: 17.30 sec;   INR: 1.45 ratio         PTT - ( 27 Nov 2024 05:46 )  PTT:40.8 sec  Urinalysis Basic - ( 27 Nov 2024 05:46 )    Color: x / Appearance: x / SG: x / pH: x  Gluc: 83 mg/dL / Ketone: x  / Bili: x / Urobili: x   Blood: x / Protein: x / Nitrite: x   Leuk Esterase: x / RBC: x / WBC x   Sq Epi: x / Non Sq Epi: x / Bacteria: x              Culture - Fungal, Body Fluid (collected 25 Nov 2024 15:54)  Source: Peritoneal  Preliminary Report (26 Nov 2024 07:58):    Testing in progress    Culture - Body Fluid with Gram Stain (collected 25 Nov 2024 15:54)  Source: Peritoneal  Gram Stain (26 Nov 2024 03:48):    No polymorphonuclear leukocytes seen    No organisms seen    by cytocentrifuge  Preliminary Report (26 Nov 2024 18:05):    No growth      Consultant Notes Reviewed:  [x ] YES  [ ] NO  Care Discussed with Consultants/Other Providers/ Housestaff [ x] YES  [ ] NO  Radiology, labs, EKGs, new studies personally reviewed.

## 2024-11-27 NOTE — DISCHARGE NOTE PROVIDER - NSDCMRMEDTOKEN_GEN_ALL_CORE_FT
Aldactone 50 mg oral tablet: 1 tab(s) orally once a day  Coreg 3.125 mg oral tablet: 1 tab(s) orally 2 times a day  folic acid 1 mg oral tablet: 1 tab(s) orally once a day  furosemide 20 mg oral tablet: 1 tab(s) orally once a day  lactulose 10 g/15 mL oral syrup: 15 milliliter(s) orally 2 times a day  methadone 10 mg oral tablet: 4 tab(s) orally once a day  multivitamin: 1 tab(s) once a day  pantoprazole 40 mg oral delayed release tablet: 1 tab(s) orally once a day (before a meal)  predniSONE 20 mg oral tablet: 2 tab(s) orally once a day  thiamine 100 mg oral tablet: 1 tab(s) orally once a day

## 2024-11-27 NOTE — DISCHARGE NOTE PROVIDER - NSDCCPCAREPLAN_GEN_ALL_CORE_FT
PRINCIPAL DISCHARGE DIAGNOSIS  Diagnosis: Cirrhosis  Assessment and Plan of Treatment: You presented to the emergency department with worsening abdominal pain related to complications from cirrhosis, likely exacerbated by recent alcohol resumption.  A paracentesis drained excess abdominal fluid, and an EGD revealed small esophageal varices.  We also investigated possible acute pancolitis based on CT findings, but you do not require antibiotics at this time.  Your thrombocytopenia and lower extremity swelling are also attributed to your underlying cirrhosis. You will continue your methadone for chronic pain and opiate dependence.  You are being discharged with prescriptions for Coreg (carvedilol) twice daily, lactulose twice daily, prednisone 40mg daily, Lasix (furosemide) 20mg daily, and Aldactone (spironolactone) 50mg daily.  It is crucial that you follow up with Dr. Spring in the Hepatology clinic as scheduled. You should also schedule an outpatient colonoscopy.  We strongly advise you to abstain from alcohol to prevent further complications of your cirrhosis.  Please contact your doctor if your symptoms worsen or you have any concerns.  You should continue taking your methadone 40mg daily as prescribed.  Come back to the hospital at anytime for any symptoms or concerns.      SECONDARY DISCHARGE DIAGNOSES  Diagnosis: Colitis  Assessment and Plan of Treatment:     Diagnosis: Elevated LFTs  Assessment and Plan of Treatment:      PRINCIPAL DISCHARGE DIAGNOSIS  Diagnosis: Cirrhosis  Assessment and Plan of Treatment: You presented to the emergency department with worsening abdominal pain related to complications from cirrhosis, likely exacerbated by recent alcohol resumption.  A paracentesis drained excess abdominal fluid, and an EGD revealed small esophageal varices.   Your thrombocytopenia and lower extremity swelling are also attributed to your underlying cirrhosis. You will continue your methadone for chronic pain and opiate dependence.  You are being discharged with prescriptions for Coreg (carvedilol) twice daily, lactulose twice daily, prednisone 40mg daily, Lasix (furosemide) 20mg daily, and Aldactone (spironolactone) 50mg daily.  It is crucial that you follow up with Dr. Spring in the Hepatology clinic and with the primary care clinic on 12/4 at 1pm. You will need blood drawn on that day to ensure that you are improving with Prednisone (Lille Score). Please bring these papers to your visit and show to your docto.     We strongly advise you to abstain from alcohol to prevent further complications of your cirrhosis.  Please use the resources provided.  Please contact your doctor if your symptoms worsen or you have any concerns.  You should continue taking your methadone 40mg daily as prescribed.  Come back to the hospital at anytime for any symptoms or concerns.      SECONDARY DISCHARGE DIAGNOSES  Diagnosis: Colitis  Assessment and Plan of Treatment:     Diagnosis: Elevated LFTs  Assessment and Plan of Treatment:

## 2024-11-27 NOTE — PROGRESS NOTE ADULT - ATTENDING COMMENTS
49 y o M with polysubstance abuse chronic backache AUD readmitted with abdominal distension and jaundice.      Was admitted in  with jaundice and ascites. Had leg swelling as well.   Drinks 1 L alcohol daily. Reports stopping for few months and mother  2 months ago and resumed alcohol intake.   Single. Not working now.    No withdrawal  Alert oriented x 3  Icteric  Abdomen distended non tender hepatomegaly+ flanks dull  Tattoos+  No asterixis.    Severe alcoholic hepatitis  MDF 44  Cirrhosis decompensated with ascites and HE  MELD 3.0 - 24  Child C   (Radiology report states steatosis but liver contour appears irregular with prominent caudate lobe)  Macrocytic anemia  Subclinical hypothyroidism    Had paracentesis - fluid studies negative for SBP  Continue spironolactone and furosemide  Continue prednisone 40 mg daily. Check Lille score D4-7  Continue lactulose for 3 BM per day  Defer SBP primary prophylaxis with ciprofloxacin interaction with methadone. No need for alternative regime  Alcohol abstinence and rehab  Echo   Note MRI abdomen  Monitor liver tests INR  LT evaluation - MELD 3.0 - 27. Vague about alcohol use Lives alone. Mother  2 months ago.    Was drinking till admission. Single. Reports brother in [place]. Limited social support.  2 g Na 1.5 g/kg protein diet  Early breakfast late night snack multiple small meals.
49 y o M with polysubstance abuse chronic backache AUD readmitted with abdominal distension and jaundice.      Was admitted in  with jaundice and ascites. Had leg swelling as well.   Drinks 1 L alcohol daily. Reports stopping for few months and mother  2 months ago and resumed alcohol intake.   Single. Not working now.    Tremors and anxious. No hallucinations.   Alert oriented x 3  Icteric  Abdomen distended non tender hepatomegaly+ flanks dull  Ext mild leg edema, ulcer L leg  Tattoos+  No asterixis. Mild tremors     Severe alcoholic hepatitis  MDF 44  Cirrhosis decompensated with ascites and HE  MELD 3.0 - 24  Child C   (Radiology report states steatosis but liver contour appears irregular with prominent caudate lobe)  Macrocytic anemia  Subclinical hypothyroidism    Please obtain paracentesis and fluid studies ASAP. Diuretics after paracentesis  Hold prednisone 40 mg with active withdrawal  Continue lactulose for 3 BM per day  SBP primary prophylaxis with ciprofloxacin with low protein  Alcohol abstinence and rehab  Echo   Would get MRI abdomen  Plan for EGD early next week as not compliant with follow up  Monitor liver tests INR  LT evaluation - MELD 3.0 - 27. Vague about alcohol use Lives alone. Mother  2 months ago.    Was drinking till admission. Single. Reports brother in [place]. Limited social support.  2 g Na 1.5 g/kg protein diet  Early breakfast late night snack multiple small meals.
49 y o M with polysubstance abuse chronic backache AUD readmitted with abdominal distension and jaundice.      Was admitted in  with jaundice and ascites. Had leg swelling as well.   Drinks 1 L alcohol daily. Reports stopping for few months and mother  2 months ago and resumed alcohol intake.   Single. Not working now.    No withdrawal  Alert oriented x 3  Icteric  Abdomen distended non tender hepatomegaly+ flanks dull  Ext mild leg edema, ulcer L leg  Tattoos+  No asterixis. Mild tremors     Severe alcoholic hepatitis  MDF 44  Cirrhosis decompensated with ascites and HE  MELD 3.0 - 24  Child C   (Radiology report states steatosis but liver contour appears irregular with prominent caudate lobe)  Macrocytic anemia  Subclinical hypothyroidism    Had paracentesis - fluid studies negative for SBP  Start on spironolactone and furosemide  Start prednisone 40 mg with active withdrawal  Continue lactulose for 3 BM per day  SBP primary prophylaxis with ciprofloxacin with low protein  Alcohol abstinence and rehab  Echo   Would get MRI abdomen  EGD in am  Monitor liver tests INR  LT evaluation - MELD 3.0 - 27. Vague about alcohol use Lives alone. Mother  2 months ago.    Was drinking till admission. Single. Reports brother in [place]. Limited social support.  2 g Na 1.5 g/kg protein diet  Early breakfast late night snack multiple small meals.

## 2024-11-27 NOTE — PROGRESS NOTE ADULT - ASSESSMENT
A 48-year-old male patient with a history of chronic back pain, opiate/heroin use on methadone, alcohol use disorder, cirrhosis  and chronic lower extremity swelling presented to the emergency department today complaining of worsening abdominal pain for the past several days. While he reports a period of sobriety, he resumed drinking two months ago, consuming approximately two pints of vodka daily, with his last drink this morning. He denies any other recent drug use.    # Decompensated liver cirrhosis  due to Etoh use disorder   #Varices    Suspected thiamine deficiency   - CT A/p: hepatic steatosis , gallbladder distension  - US abdomen showed diffuse ascites,   - CIWA protocol.  - f/u  CATCH consulted.   - Continue thiamine, folate, B12, multivitamin; takes at home.  - Monitor LFTs , bilirubin level - direct 15.4, total 20.2  - Hepatology consulted : awaiting paracentesis to start prednisone as MDF > 32, will hold on abx per ID.  - Abdomen MRI with MRCP protocol as per GI.  - EGD by GI tomorrow 11/26, NPO from midnight.  - Started on lactulose, 2 BM daily, continue to monitor  - INR  daily, 1.61 today.  - Paracentesis and fluid studies. Diuretics after paracentesis  - SBP primary prophylaxis with ciprofloxacin with low protein (awaiting response from hepatology as QTc prolonged)  - Recommend alcohol cessation. Continue multivitamins and folic acid supplements  - Continue CIWA protocol  - Outpatient follow up for HCC screening: US and AFP Q6 months  -  low salt high protein diet  11/26: EGD w/ varices. Started on Coreg as per hepatology. Awaiting plan from hepatology on steroids, SBP Px, further plan  11/27 d/w hepatology attending: no need for SBP Px as risk outweigh benefit. Start Aldactone, Prednisone. Repeat Andres score on day 7. Stable for outpt f/u.     # Acute Pancolitis   - SIRS negative, a-Sx   - CT A/P : Diffuse colonic mural thickening, which may represent pancolitis.  - Off ABx.  - colonoscopy as OP.     # Thrombocytopenia  likely due to liver cirrhosis   - Trend CBC for PLT   - Monitor for any sign of bleed  - Active Type and Screen    # Ascites due to liver cirrhosis and portal HTN    - Was on lasix 40 PO daily, but pt is noncompliant, resumed lasix 40 PO daily  -Aldactone 100 daily.    #chronic back pain  #h/o opiate and heroin abuse w/o IVDU  Denies any current use   - On Methadone 40 mg daily     Discharge instructions discussed and patient knows when to seek immediate medical attention.  Patient has proper follow up.  All results discussed and patient aware they require further follow up/work up.  Stressed importance of proper follow up.  Medications prescribed and changes discussed.  All questions and concerns from patient addressed. Understanding of instructions verbalized.    My note supersedes the residents note should a discrepancy arise.    Chart and notes personally reviewed.  Care Discussed with Consultants/Other Providers/ Housestaff [ x] YES [ ] NO   Radiology, labs, old records personally reviewed.    discussed w/ housestaff, nursing, case management, Dr. Aleman    Time-based billing (NON-critical care).     35 minutes spent on total encounter. The necessity of the time spent during the encounter on this date of service was due to:     time spent on review of labs, imaging studies, old records, obtaining history, personally examining patient, counselling and communicating with patient/ family, entering orders for medications/tests/etc, discussions with other health care providers, documentation in electronic health records, independent interpretation of labs, imaging/procedure results and care coordination.

## 2024-11-27 NOTE — DISCHARGE NOTE PROVIDER - CARE PROVIDER_API CALL
ERNIE ASENCIO  [address] [place] [id number]  Phone: [phone]  Fax: [phone]  Follow Up Time: 1 week    Tl Spring  Internal Medicine  [address] [place] [id number]  Phone: [phone]  Fax: [phone]  Follow Up Time: 1 week   Tl Spring  Internal Medicine  [address] [place] [address]  Phone: [phone]  Fax: [phone]  Follow Up Time: 1 week    Hammad Capellan  Internal Medicine  [address] [place] [address]  Phone: [phone]  Fax: [phone]  Scheduled Appointment: 12/04/2024 01:00 PM

## 2024-11-27 NOTE — DISCHARGE NOTE PROVIDER - HOSPITAL COURSE
A 48-year-old male patient with a history of chronic back pain, opiate/heroin use on methadone, alcohol use disorder, cirrhosis  and chronic lower extremity swelling presented to the emergency department today complaining of worsening abdominal pain for the past several days. While he reports a period of sobriety, he resumed drinking two months ago, consuming approximately two pints of vodka daily, with his last drink this morning. He denies any other recent drug use.    # Decompensated liver cirrhosis  due to Etoh use disorder     Suspected thiamine deficiency   - CT A/p: hepatic steatosis, gallbladder distension. US abdomen showed diffuse ascites.   - Paracentesis with IR 11/25, 2.5L taken out, negative for peritonitis.  - CIWA protocol, CATCH consulted.   - Continue thiamine, folate, B12, multivitamin; takes at home.  - Abdomen MRI with MRCP: noted.  - EGD by GI done 11/26: small varices in the lower third of esophagus.  - will discharge on Coreg BID, lactulose BID, prednisone 40mg daily, Lasix 2omg daily, Aldactone 50 daily, and Hepatology clinic follow up with Dr. Spring.    # Acute Pancolitis   - SIRS negative   - CT A/P : Diffuse colonic mural thickening, which may represent pancolitis.  - Off ABx.  - Consider colonoscopy as outpatient.     # Thrombocytopenia  likely due to liver cirrhosis   -  Stable, no signs of active bleeding.    # LE edema/ascites due to liver cirrhosis and portal HTN   - L>R with 2+ pitting edema .   - Was on lasix 40 PO daily, but pt is noncompliant, resumed lasix 40 PO daily.    #chronic back pain  #h/o opiate and heroin abuse w/o IVDU  - Denies any current use   - On Methadone 40 mg daily.    Patient is clinically and vitally stable and ready for discharge with recommendations to follow up with Dr Spring hepatology clinic. Dr Burgos discussed the plan in details with the patient.

## 2024-11-27 NOTE — DISCHARGE NOTE PROVIDER - NSDCFUADDAPPT_GEN_ALL_CORE_FT
Tl Spring in 1 week Tl Spring in 1 week  [facility] ([facility]) for intake assessment for  mental health counseling services 12/06/2024 at 9:00am with [facility]

## 2024-11-28 ENCOUNTER — TRANSCRIPTION ENCOUNTER (OUTPATIENT)
Age: 49
End: 2024-11-28

## 2024-11-28 VITALS
DIASTOLIC BLOOD PRESSURE: 69 MMHG | RESPIRATION RATE: 18 BRPM | TEMPERATURE: 98 F | HEART RATE: 64 BPM | OXYGEN SATURATION: 98 % | SYSTOLIC BLOOD PRESSURE: 108 MMHG

## 2024-11-28 LAB
ALBUMIN SERPL ELPH-MCNC: 2.6 G/DL — LOW (ref 3.5–5.2)
ALP SERPL-CCNC: 354 U/L — HIGH (ref 30–115)
ALT FLD-CCNC: 64 U/L — HIGH (ref 0–41)
ANION GAP SERPL CALC-SCNC: 10 MMOL/L — SIGNIFICANT CHANGE UP (ref 7–14)
APTT BLD: 42.6 SEC — HIGH (ref 27–39.2)
AST SERPL-CCNC: 149 U/L — HIGH (ref 0–41)
BASOPHILS # BLD AUTO: 0.03 K/UL — SIGNIFICANT CHANGE UP (ref 0–0.2)
BASOPHILS NFR BLD AUTO: 0.4 % — SIGNIFICANT CHANGE UP (ref 0–1)
BILIRUB SERPL-MCNC: 24.4 MG/DL — CRITICAL HIGH (ref 0.2–1.2)
BUN SERPL-MCNC: 12 MG/DL — SIGNIFICANT CHANGE UP (ref 10–20)
CALCIUM SERPL-MCNC: 8.1 MG/DL — LOW (ref 8.4–10.4)
CHLORIDE SERPL-SCNC: 99 MMOL/L — SIGNIFICANT CHANGE UP (ref 98–110)
CO2 SERPL-SCNC: 25 MMOL/L — SIGNIFICANT CHANGE UP (ref 17–32)
CREAT SERPL-MCNC: 1 MG/DL — SIGNIFICANT CHANGE UP (ref 0.7–1.5)
EGFR: 92 ML/MIN/1.73M2 — SIGNIFICANT CHANGE UP
EOSINOPHIL # BLD AUTO: 0.04 K/UL — SIGNIFICANT CHANGE UP (ref 0–0.7)
EOSINOPHIL NFR BLD AUTO: 0.5 % — SIGNIFICANT CHANGE UP (ref 0–8)
GLUCOSE SERPL-MCNC: 116 MG/DL — HIGH (ref 70–99)
HCT VFR BLD CALC: 32.5 % — LOW (ref 42–52)
HGB BLD-MCNC: 11.2 G/DL — LOW (ref 14–18)
IMM GRANULOCYTES NFR BLD AUTO: 1.5 % — HIGH (ref 0.1–0.3)
INR BLD: 1.53 RATIO — HIGH (ref 0.65–1.3)
LYMPHOCYTES # BLD AUTO: 0.85 K/UL — LOW (ref 1.2–3.4)
LYMPHOCYTES # BLD AUTO: 10.4 % — LOW (ref 20.5–51.1)
MAGNESIUM SERPL-MCNC: 2 MG/DL — SIGNIFICANT CHANGE UP (ref 1.8–2.4)
MCHC RBC-ENTMCNC: 34.5 G/DL — SIGNIFICANT CHANGE UP (ref 32–37)
MCHC RBC-ENTMCNC: 40.4 PG — HIGH (ref 27–31)
MCV RBC AUTO: 117.3 FL — HIGH (ref 80–94)
MELD SCORE WITH DIALYSIS: 37 POINTS — SIGNIFICANT CHANGE UP
MELD SCORE WITHOUT DIALYSIS: 25 POINTS — SIGNIFICANT CHANGE UP
MONOCYTES # BLD AUTO: 0.57 K/UL — SIGNIFICANT CHANGE UP (ref 0.1–0.6)
MONOCYTES NFR BLD AUTO: 7 % — SIGNIFICANT CHANGE UP (ref 1.7–9.3)
NEUTROPHILS # BLD AUTO: 6.55 K/UL — HIGH (ref 1.4–6.5)
NEUTROPHILS NFR BLD AUTO: 80.2 % — HIGH (ref 42.2–75.2)
NRBC # BLD: 0 /100 WBCS — SIGNIFICANT CHANGE UP (ref 0–0)
PLATELET # BLD AUTO: 105 K/UL — LOW (ref 130–400)
PMV BLD: 12.1 FL — HIGH (ref 7.4–10.4)
POTASSIUM SERPL-MCNC: 4 MMOL/L — SIGNIFICANT CHANGE UP (ref 3.5–5)
POTASSIUM SERPL-SCNC: 4 MMOL/L — SIGNIFICANT CHANGE UP (ref 3.5–5)
PROT SERPL-MCNC: 5.2 G/DL — LOW (ref 6–8)
PROTHROM AB SERPL-ACNC: 18.2 SEC — HIGH (ref 9.95–12.87)
RBC # BLD: 2.77 M/UL — LOW (ref 4.7–6.1)
RBC # FLD: 16.9 % — HIGH (ref 11.5–14.5)
SODIUM SERPL-SCNC: 134 MMOL/L — LOW (ref 135–146)
WBC # BLD: 8.16 K/UL — SIGNIFICANT CHANGE UP (ref 4.8–10.8)
WBC # FLD AUTO: 8.16 K/UL — SIGNIFICANT CHANGE UP (ref 4.8–10.8)

## 2024-11-28 PROCEDURE — 99239 HOSP IP/OBS DSCHRG MGMT >30: CPT

## 2024-11-28 RX ADMIN — Medication 1 MILLIGRAM(S): at 11:12

## 2024-11-28 RX ADMIN — CHLORHEXIDINE GLUCONATE 1 APPLICATION(S): 1.2 RINSE ORAL at 07:20

## 2024-11-28 RX ADMIN — Medication 100 MILLIGRAM(S): at 11:12

## 2024-11-28 RX ADMIN — FUROSEMIDE 20 MILLIGRAM(S): 40 TABLET ORAL at 06:48

## 2024-11-28 RX ADMIN — CARVEDILOL 3.12 MILLIGRAM(S): 25 TABLET, FILM COATED ORAL at 06:49

## 2024-11-28 RX ADMIN — PANTOPRAZOLE SODIUM 40 MILLIGRAM(S): 40 TABLET, DELAYED RELEASE ORAL at 06:48

## 2024-11-28 RX ADMIN — LACTULOSE 10 GRAM(S): 10 SOLUTION ORAL at 06:47

## 2024-11-28 RX ADMIN — PREDNISONE 40 MILLIGRAM(S): 20 TABLET ORAL at 06:49

## 2024-11-28 RX ADMIN — Medication 50 MILLIGRAM(S): at 06:48

## 2024-11-28 RX ADMIN — METHADONE HYDROCHLORIDE 40 MILLIGRAM(S): 5 TABLET ORAL at 11:11

## 2024-11-28 NOTE — DISCHARGE NOTE NURSING/CASE MANAGEMENT/SOCIAL WORK - NSDCFUADDAPPT_GEN_ALL_CORE_FT
Tl Spring in 1 week  [facility] ([facility]) for intake assessment for  mental health counseling services 12/06/2024 at 9:00am with [facility]

## 2024-11-28 NOTE — PROGRESS NOTE ADULT - PROVIDER SPECIALTY LIST ADULT
Internal Medicine
Hepatology
Hepatology
Hospitalist
Hospitalist
Internal Medicine
Hepatology
Hospitalist
Internal Medicine

## 2024-11-28 NOTE — PROGRESS NOTE ADULT - ASSESSMENT
A 48-year-old male patient with a history of chronic back pain, opiate/heroin use on methadone, alcohol use disorder, cirrhosis  and chronic lower extremity swelling presented to the emergency department today complaining of worsening abdominal pain for the past several days. While he reports a period of sobriety, he resumed drinking two months ago, consuming approximately two pints of vodka daily, with his last drink this morning. He denies any other recent drug use.    # Decompensated liver cirrhosis  due to Etoh use disorder   #Varices    Suspected thiamine deficiency   - CT A/p: hepatic steatosis , gallbladder distension  - US abdomen showed diffuse ascites,   - CIWA protocol.  - f/u  CATCH consulted.   - Continue thiamine, folate, B12, multivitamin; takes at home.  - Monitor LFTs , bilirubin level - direct 15.4, total 20.2  - Hepatology consulted : awaiting paracentesis to start prednisone as MDF > 32, will hold on abx per ID.  - Abdomen MRI with MRCP protocol as per GI.  - EGD by GI tomorrow 11/26, NPO from midnight.  - Started on lactulose, 2 BM daily, continue to monitor  - INR  daily, 1.61 today.  - Paracentesis and fluid studies. Diuretics after paracentesis  - SBP primary prophylaxis with ciprofloxacin with low protein (awaiting response from hepatology as QTc prolonged)  - Recommend alcohol cessation. Continue multivitamins and folic acid supplements  - Continue CIWA protocol  - Outpatient follow up for HCC screening: US and AFP Q6 months  -  low salt high protein diet  11/26: EGD w/ varices. Started on Coreg as per hepatology. Awaiting plan from hepatology on steroids, SBP Px, further plan  11/27 d/w hepatology attending: no need for SBP Px as risk outweigh benefit. Start Aldactone, Prednisone. Repeat Andres score on day 7. Stable for outpt f/u.     # Acute Pancolitis   - SIRS negative, a-Sx   - CT A/P : Diffuse colonic mural thickening, which may represent pancolitis.  - Off ABx.  - colonoscopy as OP.     # Thrombocytopenia  likely due to liver cirrhosis   - Trend CBC for PLT   - Monitor for any sign of bleed  - Active Type and Screen    # Ascites due to liver cirrhosis and portal HTN    - Was on lasix 40 PO daily, but pt is noncompliant, resumed lasix 40 PO daily  -Aldactone 100 daily.    #chronic back pain  #h/o opiate and heroin abuse w/o IVDU  Denies any current use   - On Methadone 40 mg daily     Discharge instructions discussed and patient knows when to seek immediate medical attention.  Patient has proper follow up.  All results discussed and patient aware they require further follow up/work up.  Stressed importance of proper follow up.  Medications prescribed and changes discussed.  All questions and concerns from patient addressed. Understanding of instructions verbalized.    My note supersedes the residents note should a discrepancy arise.    Chart and notes personally reviewed.  Care Discussed with Consultants/Other Providers/ Housestaff [ x] YES [ ] NO   Radiology, labs, old records personally reviewed.    discussed w/ housestaff, nursing, case management    Time-based billing (NON-critical care).     35 minutes spent on total encounter. The necessity of the time spent during the encounter on this date of service was due to:     time spent on review of labs, imaging studies, old records, obtaining history, personally examining patient, counselling and communicating with patient/ family, entering orders for medications/tests/etc, discussions with other health care providers, documentation in electronic health records, independent interpretation of labs, imaging/procedure results and care coordination.

## 2024-11-28 NOTE — PROGRESS NOTE ADULT - SUBJECTIVE AND OBJECTIVE BOX
Patient is a 49y old  Male who presents with a chief complaint of abdominal pain (25 Nov 2024 16:49)    INTERVAL HPI/OVERNIGHT EVENTS: Patient was examined and seen at bedside. This morning pt is resting comfortably in bed and reports no new issues or overnight events. No new complaints, feels well. Wants to go home today and reports that he will follow up with methadone clinic tomorrow.  ROS: Denies CP, SOB, AP, new weakness  All other systems reviewed and are within normal limits.  InitialHPI:  A 48-year-old male patient with a history of chronic back pain and chronic lower extremity swelling presented to the emergency department today complaining of worsening abdominal pain for the past several days. He has a history of opiate and heroin abuse (non-IV drug use) and is currently prescribed methadone 40mg daily through [facility] ([phone]). He also has a history of alcohol abuse beginning in September 2023. While he reports a period of sobriety, he resumed drinking two months ago, consuming approximately two pints of vodka daily, with his last drink this morning. He denies any other recent drug use.    The patient was previously admitted to the ED on June 19th for sepsis secondary to colitis and was discharged on June 26th after receiving antibiotic treatment. Since his discharge, his abdominal pain and distension have progressively worsened, accompanied by nausea, decreased appetite, and vomiting bile at least twice daily. He denies experiencing tremors, insomnia, seizures, diarrhea, constipation, cough, sore throat, fevers, or chills. He also reports the onset of jaundice in early 2024 and was recently diagnosed with cirrhosis.    The patient's brother, Reji Vargas ([phone]), is a healthcare professional and can be contacted for further information.    As per patient he is on bunch of medication but stopped taking them since last few months.     vitally stable in Ed   On labs WBCs 11.77 e Neutrophil predom ,  , deranged coags ,and LFTs  elevated Total bili of 22.9 , bilirubin direct 16.7 , indirect bilirubin of 6.2 ,lipase of 107     On CT A/P : Diffuse colonic mural thickening, which may represent pancolitis in the   appropriate clinical setting. No abscess.  Marked hepatic steatosis., Nonspecific gallbladder distention.    rcvd cipro and flagyl in ed     admitted for further workup .  (20 Nov 2024 22:01)    PAST MEDICAL & SURGICAL HISTORY:  Cirrhosis      Opiate abuse, episodic      Moderate heroin dependence in sustained remission          General: NAD, AAO3, +jaundiced  HEENT:  EOMI, no LAD  CV: S1 S2  Resp: decreased breath sounds at bases  GI: NT/firm,, +distention; +BS, +telangiectasias,  MS: no clubbing/cyanosis/edema, + pulses b/l  Neuro: nonfocal, +reflexes thruout. No asterixis.            Home Medications:  methadone 10 mg oral tablet: 4 tab(s) orally once a day (20 Nov 2024 23:43)  multivitamin: 1 tab(s) once a day (20 Nov 2024 23:44)    MEDICATIONS  (STANDING):  carvedilol 3.125 milliGRAM(s) Oral every 12 hours  chlorhexidine 2% Cloths 1 Application(s) Topical daily  chlorhexidine 2% Cloths 1 Application(s) Topical <User Schedule>  enoxaparin Injectable 40 milliGRAM(s) SubCutaneous every 24 hours  folic acid 1 milliGRAM(s) Oral daily  furosemide    Tablet 20 milliGRAM(s) Oral daily  lactulose Syrup 10 Gram(s) Oral two times a day  pantoprazole    Tablet 40 milliGRAM(s) Oral before breakfast  predniSONE   Tablet 40 milliGRAM(s) Oral daily  spironolactone 50 milliGRAM(s) Oral daily  thiamine 100 milliGRAM(s) Oral daily    MEDICATIONS  (PRN):  LORazepam   Injectable 1 milliGRAM(s) IV Push every 1 hour PRN CIWA-Ar score 8 or greater    Vital Signs Last 24 Hrs  T(C): 36.7 (28 Nov 2024 05:33), Max: 36.8 (27 Nov 2024 21:08)  T(F): 98.1 (28 Nov 2024 05:33), Max: 98.3 (27 Nov 2024 21:08)  HR: 64 (28 Nov 2024 05:33) (64 - 77)  BP: 108/69 (28 Nov 2024 05:33) (100/69 - 112/68)  BP(mean): 82 (28 Nov 2024 05:33) (80 - 83)  RR: 18 (28 Nov 2024 05:33) (16 - 18)  SpO2: 98% (28 Nov 2024 05:33) (98% - 100%)    Parameters below as of 28 Nov 2024 05:33  Patient On (Oxygen Delivery Method): room air      CAPILLARY BLOOD GLUCOSE        LABS:                        11.2   8.16  )-----------( 105      ( 28 Nov 2024 06:06 )             32.5     11-28    134[L]  |  99  |  12  ----------------------------<  116[H]  4.0   |  25  |  1.0    Ca    8.1[L]      28 Nov 2024 06:06  Phos  3.4     11-27  Mg     2.0     11-28    TPro  5.2[L]  /  Alb  2.6[L]  /  TBili  24.4[HH]  /  DBili  x   /  AST  149[H]  /  ALT  64[H]  /  AlkPhos  354[H]  11-28    LIVER FUNCTIONS - ( 28 Nov 2024 06:06 )  Alb: 2.6 g/dL / Pro: 5.2 g/dL / ALK PHOS: 354 U/L / ALT: 64 U/L / AST: 149 U/L / GGT: x               PT/INR - ( 28 Nov 2024 06:06 )   PT: 18.20 sec;   INR: 1.53 ratio         PTT - ( 28 Nov 2024 06:06 )  PTT:42.6 sec  Urinalysis Basic - ( 28 Nov 2024 06:06 )    Color: x / Appearance: x / SG: x / pH: x  Gluc: 116 mg/dL / Ketone: x  / Bili: x / Urobili: x   Blood: x / Protein: x / Nitrite: x   Leuk Esterase: x / RBC: x / WBC x   Sq Epi: x / Non Sq Epi: x / Bacteria: x              Culture - Fungal, Body Fluid (collected 25 Nov 2024 15:54)  Source: Peritoneal  Preliminary Report (27 Nov 2024 23:02):    Culture is being performed. Fungal cultures are held for 4 weeks.    Culture - Body Fluid with Gram Stain (collected 25 Nov 2024 15:54)  Source: Peritoneal  Gram Stain (26 Nov 2024 03:48):    No polymorphonuclear leukocytes seen    No organisms seen    by cytocentrifuge  Preliminary Report (26 Nov 2024 18:05):    No growth      Consultant Notes Reviewed:  [x ] YES  [ ] NO  Care Discussed with Consultants/Other Providers/ Housestaff [ x] YES  [ ] NO  Radiology, labs, EKGs, new studies personally reviewed.

## 2024-11-28 NOTE — DISCHARGE NOTE NURSING/CASE MANAGEMENT/SOCIAL WORK - PATIENT PORTAL LINK FT
You can access the FollowMyHealth Patient Portal offered by [facility] by registering at the following website: [email or web address]. By joining [facility]’s FollowMyHealth portal, you will also be able to view your health information using other applications (apps) compatible with our system.

## 2024-11-28 NOTE — DISCHARGE NOTE NURSING/CASE MANAGEMENT/SOCIAL WORK - FINANCIAL ASSISTANCE
[facility] provides services at a reduced cost to those who are determined to be eligible through [facility]’s financial assistance program. Information regarding [facility]’s financial assistance program can be found by going to [email or web address] or by calling [phone].

## 2024-11-30 LAB
CULTURE RESULTS: NO GROWTH — SIGNIFICANT CHANGE UP
SPECIMEN SOURCE: SIGNIFICANT CHANGE UP

## 2024-12-01 RX ORDER — PREDNISONE 20 MG/1
2 TABLET ORAL
Qty: 60 | Refills: 0
Start: 2024-12-01 | End: 2024-12-30

## 2024-12-04 ENCOUNTER — OUTPATIENT (OUTPATIENT)
Dept: OUTPATIENT SERVICES | Facility: HOSPITAL | Age: 49
LOS: 1 days | End: 2024-12-04
Payer: MEDICAID

## 2024-12-04 ENCOUNTER — APPOINTMENT (OUTPATIENT)
Dept: INTERNAL MEDICINE | Facility: CLINIC | Age: 49
End: 2024-12-04
Payer: MEDICAID

## 2024-12-04 VITALS
TEMPERATURE: 98.1 F | WEIGHT: 176 LBS | SYSTOLIC BLOOD PRESSURE: 120 MMHG | HEIGHT: 68 IN | BODY MASS INDEX: 26.67 KG/M2 | DIASTOLIC BLOOD PRESSURE: 79 MMHG | HEART RATE: 76 BPM | OXYGEN SATURATION: 97 %

## 2024-12-04 DIAGNOSIS — Z00.00 ENCOUNTER FOR GENERAL ADULT MEDICAL EXAMINATION WITHOUT ABNORMAL FINDINGS: ICD-10-CM

## 2024-12-04 DIAGNOSIS — Z23 ENCOUNTER FOR IMMUNIZATION: ICD-10-CM

## 2024-12-04 DIAGNOSIS — I85.00 ESOPHAGEAL VARICES W/OUT BLEEDING: ICD-10-CM

## 2024-12-04 DIAGNOSIS — K74.60 UNSPECIFIED CIRRHOSIS OF LIVER: ICD-10-CM

## 2024-12-04 DIAGNOSIS — Z00.00 ENCOUNTER FOR GENERAL ADULT MEDICAL EXAMINATION W/OUT ABNORMAL FINDINGS: ICD-10-CM

## 2024-12-04 DIAGNOSIS — I85.00 ESOPHAGEAL VARICES WITHOUT BLEEDING: ICD-10-CM

## 2024-12-04 PROCEDURE — 99213 OFFICE O/P EST LOW 20 MIN: CPT

## 2024-12-04 PROCEDURE — 90471 IMMUNIZATION ADMIN: CPT | Mod: 25

## 2024-12-04 PROCEDURE — 99203 OFFICE O/P NEW LOW 30 MIN: CPT

## 2024-12-06 ENCOUNTER — APPOINTMENT (OUTPATIENT)
Age: 49
End: 2024-12-06

## 2024-12-11 DIAGNOSIS — E83.42 HYPOMAGNESEMIA: ICD-10-CM

## 2024-12-11 DIAGNOSIS — K76.6 PORTAL HYPERTENSION: ICD-10-CM

## 2024-12-11 DIAGNOSIS — F10.10 ALCOHOL ABUSE, UNCOMPLICATED: ICD-10-CM

## 2024-12-11 DIAGNOSIS — K76.82 HEPATIC ENCEPHALOPATHY: ICD-10-CM

## 2024-12-11 DIAGNOSIS — E03.8 OTHER SPECIFIED HYPOTHYROIDISM: ICD-10-CM

## 2024-12-11 DIAGNOSIS — D53.9 NUTRITIONAL ANEMIA, UNSPECIFIED: ICD-10-CM

## 2024-12-11 DIAGNOSIS — K52.9 NONINFECTIVE GASTROENTERITIS AND COLITIS, UNSPECIFIED: ICD-10-CM

## 2024-12-11 DIAGNOSIS — Z79.52 LONG TERM (CURRENT) USE OF SYSTEMIC STEROIDS: ICD-10-CM

## 2024-12-11 DIAGNOSIS — K70.11 ALCOHOLIC HEPATITIS WITH ASCITES: ICD-10-CM

## 2024-12-11 DIAGNOSIS — D84.9 IMMUNODEFICIENCY, UNSPECIFIED: ICD-10-CM

## 2024-12-11 DIAGNOSIS — I85.10 SECONDARY ESOPHAGEAL VARICES WITHOUT BLEEDING: ICD-10-CM

## 2024-12-11 DIAGNOSIS — D69.59 OTHER SECONDARY THROMBOCYTOPENIA: ICD-10-CM

## 2024-12-11 DIAGNOSIS — E51.9 THIAMINE DEFICIENCY, UNSPECIFIED: ICD-10-CM

## 2024-12-11 DIAGNOSIS — K70.31 ALCOHOLIC CIRRHOSIS OF LIVER WITH ASCITES: ICD-10-CM

## 2024-12-11 DIAGNOSIS — K76.0 FATTY (CHANGE OF) LIVER, NOT ELSEWHERE CLASSIFIED: ICD-10-CM

## 2024-12-11 DIAGNOSIS — F17.210 NICOTINE DEPENDENCE, CIGARETTES, UNCOMPLICATED: ICD-10-CM

## 2024-12-11 DIAGNOSIS — L97.929 NON-PRESSURE CHRONIC ULCER OF UNSPECIFIED PART OF LEFT LOWER LEG WITH UNSPECIFIED SEVERITY: ICD-10-CM

## 2024-12-11 DIAGNOSIS — G47.00 INSOMNIA, UNSPECIFIED: ICD-10-CM

## 2024-12-11 DIAGNOSIS — F11.20 OPIOID DEPENDENCE, UNCOMPLICATED: ICD-10-CM

## 2024-12-11 DIAGNOSIS — Z79.899 OTHER LONG TERM (CURRENT) DRUG THERAPY: ICD-10-CM

## 2024-12-11 DIAGNOSIS — G89.29 OTHER CHRONIC PAIN: ICD-10-CM

## 2024-12-16 ENCOUNTER — APPOINTMENT (OUTPATIENT)
Dept: HEPATOLOGY | Facility: CLINIC | Age: 49
End: 2024-12-16

## 2025-03-24 ENCOUNTER — APPOINTMENT (OUTPATIENT)
Dept: HEPATOLOGY | Facility: CLINIC | Age: 50
End: 2025-03-24

## 2025-03-24 ENCOUNTER — EMERGENCY (EMERGENCY)
Facility: HOSPITAL | Age: 50
LOS: 0 days | Discharge: HOME | End: 2025-03-24
Attending: EMERGENCY MEDICINE
Payer: MEDICAID

## 2025-03-24 VITALS
SYSTOLIC BLOOD PRESSURE: 137 MMHG | OXYGEN SATURATION: 97 % | DIASTOLIC BLOOD PRESSURE: 89 MMHG | HEART RATE: 97 BPM | TEMPERATURE: 98 F | RESPIRATION RATE: 18 BRPM

## 2025-03-24 VITALS
HEART RATE: 90 BPM | TEMPERATURE: 98 F | DIASTOLIC BLOOD PRESSURE: 72 MMHG | RESPIRATION RATE: 17 BRPM | SYSTOLIC BLOOD PRESSURE: 130 MMHG | OXYGEN SATURATION: 98 %

## 2025-03-24 DIAGNOSIS — M25.571 PAIN IN RIGHT ANKLE AND JOINTS OF RIGHT FOOT: ICD-10-CM
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
## 2025-03-24 DIAGNOSIS — F10.10 ALCOHOL ABUSE, UNCOMPLICATED: ICD-10-CM

## 2025-03-24 DIAGNOSIS — M25.531 PAIN IN RIGHT WRIST: ICD-10-CM

## 2025-03-24 DIAGNOSIS — F17.200 NICOTINE DEPENDENCE, UNSPECIFIED, UNCOMPLICATED: ICD-10-CM
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
## 2025-03-24 PROCEDURE — 73110 X-RAY EXAM OF WRIST: CPT | Mod: 26,RT

## 2025-03-24 PROCEDURE — 73630 X-RAY EXAM OF FOOT: CPT | Mod: RT

## 2025-03-24 PROCEDURE — 73610 X-RAY EXAM OF ANKLE: CPT | Mod: RT

## 2025-03-24 PROCEDURE — 73110 X-RAY EXAM OF WRIST: CPT | Mod: RT

## 2025-03-24 PROCEDURE — 73630 X-RAY EXAM OF FOOT: CPT | Mod: 26,RT

## 2025-03-24 PROCEDURE — 99284 EMERGENCY DEPT VISIT MOD MDM: CPT | Mod: 25

## 2025-03-24 PROCEDURE — 99284 EMERGENCY DEPT VISIT MOD MDM: CPT

## 2025-03-24 PROCEDURE — 73610 X-RAY EXAM OF ANKLE: CPT | Mod: 26,RT

## 2025-03-24 NOTE — ED ADULT NURSE NOTE - NSFALLHARMRISKINTERV_ED_ALL_ED
Assistance OOB with selected safe patient handling equipment if applicable/Assistance with ambulation/Communicate risk of Fall with Harm to all staff, patient, and family/Monitor gait and stability/Monitor for mental status changes and reorient to person, place, and time, as needed/Provide visual cue: red socks, yellow wristband, yellow gown, etc/Reinforce activity limits and safety measures with patient and family/Toileting schedule using arm’s reach rule for commode and bathroom/Use of alarms - bed, stretcher, chair and/or video monitoring/Bed in lowest position, wheels locked, appropriate side rails in place/Call bell, personal items and telephone in reach/Instruct patient to call for assistance before getting out of bed/chair/stretcher/Non-slip footwear applied when patient is off stretcher/[place] to call system/Physically safe environment - no spills, clutter or unnecessary equipment/Purposeful Proactive Rounding/Room/bathroom lighting operational, light cord in reach

## 2025-03-24 NOTE — ED ADULT TRIAGE NOTE - CHIEF COMPLAINT QUOTE
[place]pt c/o of ankle pain and wrist pain. pt intoxicated stated did drink vodka today , unsure of how much.

## 2025-03-24 NOTE — ED PROVIDER NOTE - CLINICAL SUMMARY MEDICAL DECISION MAKING FREE TEXT BOX
No distress.  VSS.  XRays negative for fracture.  Fully ambulatory, non ataxic gait.  DC home.  Strict return instructions discussed.

## 2025-03-24 NOTE — ED PROVIDER NOTE - PHYSICAL EXAMINATION
CONST: in NAD  EYES: EOMI, Sclera and conjunctiva clear.   ENT: No nasal discharge. Oropharynx normal appearing, no erythema or exudates. Uvula midline.  NECK: Non-tender  CARD: Normal S1 S2; Normal rate and rhythm  RESP: Equal BS B/L, No wheezes, rhonchi or rales. No distress  GI: Soft, non-tender, non-distended.  MS: Normal ROM in all extremities. No midline spinal tenderness. TTP R wrist with FROM; TTP R ankle  SKIN: Warm, dry, Good turgor  NEURO: A&Ox3, No focal deficits. Strength 5/5 with no sensory deficits. Steady gait
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
## 2025-03-24 NOTE — ED PROVIDER NOTE - PATIENT PORTAL LINK FT
You can access the FollowMyHealth Patient Portal offered by [facility] by registering at the following website: [email or web address]. By joining [employer] [facility]’s FollowMyHealth portal, you will also be able to view your health information using other applications (apps) compatible with our system.

## 2025-04-16 ENCOUNTER — INPATIENT (INPATIENT)
Facility: HOSPITAL | Age: 50
LOS: 2 days | Discharge: HOME HEALTH | DRG: 383 | End: 2025-04-19
Attending: STUDENT IN AN ORGANIZED HEALTH CARE EDUCATION/TRAINING PROGRAM | Admitting: STUDENT IN AN ORGANIZED HEALTH CARE EDUCATION/TRAINING PROGRAM
Payer: MEDICAID

## 2025-04-16 VITALS
HEART RATE: 85 BPM | TEMPERATURE: 98 F | HEIGHT: 69 IN | RESPIRATION RATE: 18 BRPM | OXYGEN SATURATION: 99 % | DIASTOLIC BLOOD PRESSURE: 86 MMHG | SYSTOLIC BLOOD PRESSURE: 128 MMHG | WEIGHT: 162.04 LBS

## 2025-04-16 DIAGNOSIS — Z79.52 LONG TERM (CURRENT) USE OF SYSTEMIC STEROIDS: ICD-10-CM

## 2025-04-16 DIAGNOSIS — E43 UNSPECIFIED SEVERE PROTEIN-CALORIE MALNUTRITION: ICD-10-CM

## 2025-04-16 DIAGNOSIS — K70.30 ALCOHOLIC CIRRHOSIS OF LIVER WITHOUT ASCITES: ICD-10-CM

## 2025-04-16 DIAGNOSIS — F17.210 NICOTINE DEPENDENCE, CIGARETTES, UNCOMPLICATED: ICD-10-CM

## 2025-04-16 DIAGNOSIS — E83.39 OTHER DISORDERS OF PHOSPHORUS METABOLISM: ICD-10-CM

## 2025-04-16 DIAGNOSIS — E16.2 HYPOGLYCEMIA, UNSPECIFIED: ICD-10-CM

## 2025-04-16 DIAGNOSIS — I85.10 SECONDARY ESOPHAGEAL VARICES WITHOUT BLEEDING: ICD-10-CM

## 2025-04-16 DIAGNOSIS — D84.89 OTHER IMMUNODEFICIENCIES: ICD-10-CM
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
## 2025-04-16 DIAGNOSIS — L02.416 CUTANEOUS ABSCESS OF LEFT LOWER LIMB: ICD-10-CM

## 2025-04-16 DIAGNOSIS — D64.9 ANEMIA, UNSPECIFIED: ICD-10-CM

## 2025-04-16 DIAGNOSIS — D68.4 ACQUIRED COAGULATION FACTOR DEFICIENCY: ICD-10-CM

## 2025-04-16 DIAGNOSIS — M54.9 DORSALGIA, UNSPECIFIED: ICD-10-CM

## 2025-04-16 DIAGNOSIS — D69.59 OTHER SECONDARY THROMBOCYTOPENIA: ICD-10-CM

## 2025-04-16 DIAGNOSIS — I96 GANGRENE, NOT ELSEWHERE CLASSIFIED: ICD-10-CM

## 2025-04-16 DIAGNOSIS — L03.116 CELLULITIS OF LEFT LOWER LIMB: ICD-10-CM

## 2025-04-16 DIAGNOSIS — E83.42 HYPOMAGNESEMIA: ICD-10-CM

## 2025-04-16 DIAGNOSIS — E87.20 ACIDOSIS, UNSPECIFIED: ICD-10-CM

## 2025-04-16 DIAGNOSIS — K70.10 ALCOHOLIC HEPATITIS WITHOUT ASCITES: ICD-10-CM

## 2025-04-16 DIAGNOSIS — L03.115 CELLULITIS OF RIGHT LOWER LIMB: ICD-10-CM

## 2025-04-16 DIAGNOSIS — L08.9 LOCAL INFECTION OF THE SKIN AND SUBCUTANEOUS TISSUE, UNSPECIFIED: ICD-10-CM

## 2025-04-16 DIAGNOSIS — F11.20 OPIOID DEPENDENCE, UNCOMPLICATED: ICD-10-CM

## 2025-04-16 DIAGNOSIS — G89.29 OTHER CHRONIC PAIN: ICD-10-CM

## 2025-04-16 DIAGNOSIS — F10.129 ALCOHOL ABUSE WITH INTOXICATION, UNSPECIFIED: ICD-10-CM

## 2025-04-16 DIAGNOSIS — D61.818 OTHER PANCYTOPENIA: ICD-10-CM

## 2025-04-16 LAB
ALBUMIN SERPL ELPH-MCNC: 3.7 G/DL — SIGNIFICANT CHANGE UP (ref 3.5–5.2)
ALBUMIN SERPL ELPH-MCNC: 3.7 G/DL — SIGNIFICANT CHANGE UP (ref 3.5–5.2)
ALBUMIN SERPL ELPH-MCNC: 3.8 G/DL — SIGNIFICANT CHANGE UP (ref 3.5–5.2)
ALP SERPL-CCNC: 430 U/L — HIGH (ref 30–115)
ALP SERPL-CCNC: 443 U/L — HIGH (ref 30–115)
ALP SERPL-CCNC: 519 U/L — HIGH (ref 30–115)
ALT FLD-CCNC: 61 U/L — HIGH (ref 0–41)
ALT FLD-CCNC: 61 U/L — HIGH (ref 0–41)
ALT FLD-CCNC: 68 U/L — HIGH (ref 0–41)
ANION GAP SERPL CALC-SCNC: 16 MMOL/L — HIGH (ref 7–14)
ANION GAP SERPL CALC-SCNC: 19 MMOL/L — HIGH (ref 7–14)
ANION GAP SERPL CALC-SCNC: 21 MMOL/L — HIGH (ref 7–14)
APTT BLD: 33.1 SEC — SIGNIFICANT CHANGE UP (ref 27–39.2)
AST SERPL-CCNC: 219 U/L — HIGH (ref 0–41)
AST SERPL-CCNC: 221 U/L — HIGH (ref 0–41)
AST SERPL-CCNC: 256 U/L — HIGH (ref 0–41)
B-OH-BUTYR SERPL-SCNC: 1.3 MMOL/L — HIGH
B-OH-BUTYR SERPL-SCNC: 4.3 MMOL/L — HIGH
BASOPHILS # BLD AUTO: 0.07 K/UL — SIGNIFICANT CHANGE UP (ref 0–0.2)
BASOPHILS # BLD AUTO: 0.07 K/UL — SIGNIFICANT CHANGE UP (ref 0–0.2)
BASOPHILS NFR BLD AUTO: 0.4 % — SIGNIFICANT CHANGE UP (ref 0–1)
BASOPHILS NFR BLD AUTO: 0.5 % — SIGNIFICANT CHANGE UP (ref 0–1)
BILIRUB DIRECT SERPL-MCNC: 2.7 MG/DL — HIGH (ref 0–0.3)
BILIRUB DIRECT SERPL-MCNC: 2.7 MG/DL — HIGH (ref 0–0.3)
BILIRUB INDIRECT FLD-MCNC: 1.3 MG/DL — HIGH (ref 0.2–1.2)
BILIRUB INDIRECT FLD-MCNC: 1.4 MG/DL — HIGH (ref 0.2–1.2)
BILIRUB SERPL-MCNC: 4 MG/DL — HIGH (ref 0.2–1.2)
BILIRUB SERPL-MCNC: 4.1 MG/DL — HIGH (ref 0.2–1.2)
BILIRUB SERPL-MCNC: 4.1 MG/DL — HIGH (ref 0.2–1.2)
BILIRUB SERPL-MCNC: 5 MG/DL — HIGH (ref 0.2–1.2)
BUN SERPL-MCNC: 13 MG/DL — SIGNIFICANT CHANGE UP (ref 10–20)
BUN SERPL-MCNC: 17 MG/DL — SIGNIFICANT CHANGE UP (ref 10–20)
BUN SERPL-MCNC: 17 MG/DL — SIGNIFICANT CHANGE UP (ref 10–20)
CALCIUM SERPL-MCNC: 8.5 MG/DL — SIGNIFICANT CHANGE UP (ref 8.4–10.5)
CALCIUM SERPL-MCNC: 8.7 MG/DL — SIGNIFICANT CHANGE UP (ref 8.4–10.5)
CALCIUM SERPL-MCNC: 8.9 MG/DL — SIGNIFICANT CHANGE UP (ref 8.4–10.5)
CHLORIDE SERPL-SCNC: 97 MMOL/L — LOW (ref 98–110)
CHLORIDE SERPL-SCNC: 98 MMOL/L — SIGNIFICANT CHANGE UP (ref 98–110)
CHLORIDE SERPL-SCNC: 99 MMOL/L — SIGNIFICANT CHANGE UP (ref 98–110)
CO2 SERPL-SCNC: 20 MMOL/L — SIGNIFICANT CHANGE UP (ref 17–32)
CO2 SERPL-SCNC: 21 MMOL/L — SIGNIFICANT CHANGE UP (ref 17–32)
CO2 SERPL-SCNC: 23 MMOL/L — SIGNIFICANT CHANGE UP (ref 17–32)
CREAT SERPL-MCNC: 0.6 MG/DL — LOW (ref 0.7–1.5)
CRP SERPL-MCNC: 71.9 MG/L — HIGH
EGFR: 118 ML/MIN/1.73M2 — SIGNIFICANT CHANGE UP
EOSINOPHIL # BLD AUTO: 0.01 K/UL — SIGNIFICANT CHANGE UP (ref 0–0.7)
EOSINOPHIL # BLD AUTO: 0.51 K/UL — SIGNIFICANT CHANGE UP (ref 0–0.7)
EOSINOPHIL NFR BLD AUTO: 0.1 % — SIGNIFICANT CHANGE UP (ref 0–8)
EOSINOPHIL NFR BLD AUTO: 3.3 % — SIGNIFICANT CHANGE UP (ref 0–8)
ERYTHROCYTE [SEDIMENTATION RATE] IN BLOOD: 47 MM/HR — HIGH (ref 0–10)
ETHANOL SERPL-MCNC: 196 MG/DL — HIGH
GGT SERPL-CCNC: 516 U/L — HIGH (ref 1–40)
GLUCOSE BLDC GLUCOMTR-MCNC: 117 MG/DL — HIGH (ref 70–99)
GLUCOSE SERPL-MCNC: 60 MG/DL — LOW (ref 70–99)
GLUCOSE SERPL-MCNC: 98 MG/DL — SIGNIFICANT CHANGE UP (ref 70–99)
GLUCOSE SERPL-MCNC: 99 MG/DL — SIGNIFICANT CHANGE UP (ref 70–99)
HCT VFR BLD CALC: 32 % — LOW (ref 42–52)
HCT VFR BLD CALC: 37.3 % — LOW (ref 42–52)
HGB BLD-MCNC: 10.7 G/DL — LOW (ref 14–18)
HGB BLD-MCNC: 12.4 G/DL — LOW (ref 14–18)
IMM GRANULOCYTES NFR BLD AUTO: 0.4 % — HIGH (ref 0.1–0.3)
IMM GRANULOCYTES NFR BLD AUTO: 0.6 % — HIGH (ref 0.1–0.3)
INR BLD: 1.18 RATIO — SIGNIFICANT CHANGE UP (ref 0.65–1.3)
LACTATE SERPL-SCNC: 2.6 MMOL/L — HIGH (ref 0.7–2)
LYMPHOCYTES # BLD AUTO: 0.76 K/UL — LOW (ref 1.2–3.4)
LYMPHOCYTES # BLD AUTO: 1.09 K/UL — LOW (ref 1.2–3.4)
LYMPHOCYTES # BLD AUTO: 4.9 % — LOW (ref 20.5–51.1)
LYMPHOCYTES # BLD AUTO: 8.1 % — LOW (ref 20.5–51.1)
MAGNESIUM SERPL-MCNC: 1.7 MG/DL — LOW (ref 1.8–2.4)
MAGNESIUM SERPL-MCNC: 1.7 MG/DL — LOW (ref 1.8–2.4)
MCHC RBC-ENTMCNC: 31.5 PG — HIGH (ref 27–31)
MCHC RBC-ENTMCNC: 31.5 PG — HIGH (ref 27–31)
MCHC RBC-ENTMCNC: 33.2 G/DL — SIGNIFICANT CHANGE UP (ref 32–37)
MCHC RBC-ENTMCNC: 33.4 G/DL — SIGNIFICANT CHANGE UP (ref 32–37)
MCV RBC AUTO: 94.1 FL — HIGH (ref 80–94)
MCV RBC AUTO: 94.7 FL — HIGH (ref 80–94)
MONOCYTES # BLD AUTO: 0.39 K/UL — SIGNIFICANT CHANGE UP (ref 0.1–0.6)
MONOCYTES # BLD AUTO: 0.55 K/UL — SIGNIFICANT CHANGE UP (ref 0.1–0.6)
MONOCYTES NFR BLD AUTO: 2.5 % — SIGNIFICANT CHANGE UP (ref 1.7–9.3)
MONOCYTES NFR BLD AUTO: 4.1 % — SIGNIFICANT CHANGE UP (ref 1.7–9.3)
NEUTROPHILS # BLD AUTO: 11.71 K/UL — HIGH (ref 1.4–6.5)
NEUTROPHILS # BLD AUTO: 13.84 K/UL — HIGH (ref 1.4–6.5)
NEUTROPHILS NFR BLD AUTO: 86.8 % — HIGH (ref 42.2–75.2)
NEUTROPHILS NFR BLD AUTO: 88.3 % — HIGH (ref 42.2–75.2)
NRBC BLD AUTO-RTO: 0 /100 WBCS — SIGNIFICANT CHANGE UP (ref 0–0)
NRBC BLD AUTO-RTO: 0 /100 WBCS — SIGNIFICANT CHANGE UP (ref 0–0)
PHOSPHATE SERPL-MCNC: 2.2 MG/DL — SIGNIFICANT CHANGE UP (ref 2.1–4.9)
PHOSPHATE SERPL-MCNC: 2.2 MG/DL — SIGNIFICANT CHANGE UP (ref 2.1–4.9)
PLATELET # BLD AUTO: 76 K/UL — LOW (ref 130–400)
PLATELET # BLD AUTO: 96 K/UL — LOW (ref 130–400)
PMV BLD: 10.2 FL — SIGNIFICANT CHANGE UP (ref 7.4–10.4)
PMV BLD: 9.9 FL — SIGNIFICANT CHANGE UP (ref 7.4–10.4)
POTASSIUM SERPL-MCNC: 4.4 MMOL/L — SIGNIFICANT CHANGE UP (ref 3.5–5)
POTASSIUM SERPL-MCNC: 4.4 MMOL/L — SIGNIFICANT CHANGE UP (ref 3.5–5)
POTASSIUM SERPL-MCNC: 4.6 MMOL/L — SIGNIFICANT CHANGE UP (ref 3.5–5)
POTASSIUM SERPL-SCNC: 4.4 MMOL/L — SIGNIFICANT CHANGE UP (ref 3.5–5)
POTASSIUM SERPL-SCNC: 4.4 MMOL/L — SIGNIFICANT CHANGE UP (ref 3.5–5)
POTASSIUM SERPL-SCNC: 4.6 MMOL/L — SIGNIFICANT CHANGE UP (ref 3.5–5)
PROT SERPL-MCNC: 7.1 G/DL — SIGNIFICANT CHANGE UP (ref 6–8)
PROT SERPL-MCNC: 7.1 G/DL — SIGNIFICANT CHANGE UP (ref 6–8)
PROT SERPL-MCNC: 8.2 G/DL — HIGH (ref 6–8)
PROTHROM AB SERPL-ACNC: 14 SEC — HIGH (ref 9.95–12.87)
RBC # BLD: 3.4 M/UL — LOW (ref 4.7–6.1)
RBC # BLD: 3.94 M/UL — LOW (ref 4.7–6.1)
RBC # FLD: 17.3 % — HIGH (ref 11.5–14.5)
RBC # FLD: 17.5 % — HIGH (ref 11.5–14.5)
SODIUM SERPL-SCNC: 137 MMOL/L — SIGNIFICANT CHANGE UP (ref 135–146)
SODIUM SERPL-SCNC: 138 MMOL/L — SIGNIFICANT CHANGE UP (ref 135–146)
SODIUM SERPL-SCNC: 139 MMOL/L — SIGNIFICANT CHANGE UP (ref 135–146)
WBC # BLD: 13.49 K/UL — HIGH (ref 4.8–10.8)
WBC # BLD: 15.66 K/UL — HIGH (ref 4.8–10.8)
WBC # FLD AUTO: 13.49 K/UL — HIGH (ref 4.8–10.8)
WBC # FLD AUTO: 15.66 K/UL — HIGH (ref 4.8–10.8)

## 2025-04-16 PROCEDURE — 76705 ECHO EXAM OF ABDOMEN: CPT | Mod: 26

## 2025-04-16 PROCEDURE — 80202 ASSAY OF VANCOMYCIN: CPT

## 2025-04-16 PROCEDURE — 76705 ECHO EXAM OF ABDOMEN: CPT

## 2025-04-16 PROCEDURE — 87077 CULTURE AEROBIC IDENTIFY: CPT

## 2025-04-16 PROCEDURE — 83735 ASSAY OF MAGNESIUM: CPT

## 2025-04-16 PROCEDURE — 93005 ELECTROCARDIOGRAM TRACING: CPT

## 2025-04-16 PROCEDURE — 99285 EMERGENCY DEPT VISIT HI MDM: CPT

## 2025-04-16 PROCEDURE — 99223 1ST HOSP IP/OBS HIGH 75: CPT

## 2025-04-16 PROCEDURE — 82607 VITAMIN B-12: CPT

## 2025-04-16 PROCEDURE — 11045 DBRDMT SUBQ TISS EACH ADDL: CPT

## 2025-04-16 PROCEDURE — 87186 SC STD MICRODIL/AGAR DIL: CPT

## 2025-04-16 PROCEDURE — 80307 DRUG TEST PRSMV CHEM ANLYZR: CPT

## 2025-04-16 PROCEDURE — 84100 ASSAY OF PHOSPHORUS: CPT

## 2025-04-16 PROCEDURE — 80048 BASIC METABOLIC PNL TOTAL CA: CPT

## 2025-04-16 PROCEDURE — 83010 ASSAY OF HAPTOGLOBIN QUANT: CPT

## 2025-04-16 PROCEDURE — 97161 PT EVAL LOW COMPLEX 20 MIN: CPT | Mod: GP

## 2025-04-16 PROCEDURE — 85025 COMPLETE CBC W/AUTO DIFF WBC: CPT

## 2025-04-16 PROCEDURE — 82010 KETONE BODYS QUAN: CPT

## 2025-04-16 PROCEDURE — 73620 X-RAY EXAM OF FOOT: CPT | Mod: 26,50

## 2025-04-16 PROCEDURE — 36415 COLL VENOUS BLD VENIPUNCTURE: CPT

## 2025-04-16 PROCEDURE — 83605 ASSAY OF LACTIC ACID: CPT

## 2025-04-16 PROCEDURE — 82962 GLUCOSE BLOOD TEST: CPT

## 2025-04-16 PROCEDURE — 82247 BILIRUBIN TOTAL: CPT

## 2025-04-16 PROCEDURE — 80076 HEPATIC FUNCTION PANEL: CPT

## 2025-04-16 PROCEDURE — 83615 LACTATE (LD) (LDH) ENZYME: CPT

## 2025-04-16 PROCEDURE — 97110 THERAPEUTIC EXERCISES: CPT | Mod: GP

## 2025-04-16 PROCEDURE — 86140 C-REACTIVE PROTEIN: CPT

## 2025-04-16 PROCEDURE — 80053 COMPREHEN METABOLIC PANEL: CPT

## 2025-04-16 PROCEDURE — 93010 ELECTROCARDIOGRAM REPORT: CPT

## 2025-04-16 PROCEDURE — 86850 RBC ANTIBODY SCREEN: CPT

## 2025-04-16 PROCEDURE — 87070 CULTURE OTHR SPECIMN AEROBIC: CPT

## 2025-04-16 PROCEDURE — 87075 CULTR BACTERIA EXCEPT BLOOD: CPT

## 2025-04-16 PROCEDURE — 86900 BLOOD TYPING SEROLOGIC ABO: CPT

## 2025-04-16 PROCEDURE — 82977 ASSAY OF GGT: CPT

## 2025-04-16 PROCEDURE — 11042 DBRDMT SUBQ TIS 1ST 20SQCM/<: CPT

## 2025-04-16 PROCEDURE — 87641 MR-STAPH DNA AMP PROBE: CPT

## 2025-04-16 PROCEDURE — 86901 BLOOD TYPING SEROLOGIC RH(D): CPT

## 2025-04-16 PROCEDURE — 80074 ACUTE HEPATITIS PANEL: CPT

## 2025-04-16 PROCEDURE — 84550 ASSAY OF BLOOD/URIC ACID: CPT

## 2025-04-16 PROCEDURE — 82746 ASSAY OF FOLIC ACID SERUM: CPT

## 2025-04-16 PROCEDURE — 85730 THROMBOPLASTIN TIME PARTIAL: CPT

## 2025-04-16 PROCEDURE — 82248 BILIRUBIN DIRECT: CPT

## 2025-04-16 PROCEDURE — 87640 STAPH A DNA AMP PROBE: CPT

## 2025-04-16 PROCEDURE — 87389 HIV-1 AG W/HIV-1&-2 AB AG IA: CPT

## 2025-04-16 PROCEDURE — 73610 X-RAY EXAM OF ANKLE: CPT | Mod: 26,50

## 2025-04-16 PROCEDURE — 85610 PROTHROMBIN TIME: CPT

## 2025-04-16 PROCEDURE — 85652 RBC SED RATE AUTOMATED: CPT

## 2025-04-16 RX ORDER — ACETAMINOPHEN 500 MG/5ML
650 LIQUID (ML) ORAL EVERY 6 HOURS
Refills: 0 | Status: DISCONTINUED | OUTPATIENT
Start: 2025-04-16 | End: 2025-04-18

## 2025-04-16 RX ORDER — AMPICILLIN SODIUM AND SULBACTAM SODIUM 1; .5 G/1; G/1
INJECTION, POWDER, FOR SOLUTION INTRAMUSCULAR; INTRAVENOUS
Refills: 0 | Status: DISCONTINUED | OUTPATIENT
Start: 2025-04-16 | End: 2025-04-19

## 2025-04-16 RX ORDER — MAGNESIUM SULFATE 500 MG/ML
2 SYRINGE (ML) INJECTION ONCE
Refills: 0 | Status: COMPLETED | OUTPATIENT
Start: 2025-04-16 | End: 2025-04-16

## 2025-04-16 RX ORDER — VANCOMYCIN HCL IN 5 % DEXTROSE 1.5G/250ML
1000 PLASTIC BAG, INJECTION (ML) INTRAVENOUS EVERY 12 HOURS
Refills: 0 | Status: DISCONTINUED | OUTPATIENT
Start: 2025-04-16 | End: 2025-04-17

## 2025-04-16 RX ORDER — ONDANSETRON HCL/PF 4 MG/2 ML
4 VIAL (ML) INJECTION EVERY 8 HOURS
Refills: 0 | Status: DISCONTINUED | OUTPATIENT
Start: 2025-04-16 | End: 2025-04-19

## 2025-04-16 RX ORDER — FOLIC ACID 1 MG/1
1 TABLET ORAL DAILY
Refills: 0 | Status: DISCONTINUED | OUTPATIENT
Start: 2025-04-16 | End: 2025-04-19

## 2025-04-16 RX ORDER — B1/B2/B3/B5/B6/B12/VIT C/FOLIC 500-0.5 MG
1 TABLET ORAL DAILY
Refills: 0 | Status: DISCONTINUED | OUTPATIENT
Start: 2025-04-16 | End: 2025-04-19

## 2025-04-16 RX ORDER — METHADONE HCL 10 MG
40 TABLET ORAL ONCE
Refills: 0 | Status: DISCONTINUED | OUTPATIENT
Start: 2025-04-16 | End: 2025-04-16

## 2025-04-16 RX ORDER — VANCOMYCIN HCL IN 5 % DEXTROSE 1.5G/250ML
1000 PLASTIC BAG, INJECTION (ML) INTRAVENOUS ONCE
Refills: 0 | Status: COMPLETED | OUTPATIENT
Start: 2025-04-16 | End: 2025-04-16

## 2025-04-16 RX ORDER — SODIUM CHLORIDE 9 G/1000ML
1000 INJECTION, SOLUTION INTRAVENOUS
Refills: 0 | Status: DISCONTINUED | OUTPATIENT
Start: 2025-04-16 | End: 2025-04-17

## 2025-04-16 RX ORDER — SILVER SULFADIAZINE 1 %
1 CREAM (GRAM) TOPICAL EVERY 12 HOURS
Refills: 0 | Status: DISCONTINUED | OUTPATIENT
Start: 2025-04-16 | End: 2025-04-19

## 2025-04-16 RX ORDER — AMPICILLIN SODIUM AND SULBACTAM SODIUM 1; .5 G/1; G/1
3 INJECTION, POWDER, FOR SOLUTION INTRAMUSCULAR; INTRAVENOUS EVERY 6 HOURS
Refills: 0 | Status: DISCONTINUED | OUTPATIENT
Start: 2025-04-17 | End: 2025-04-19

## 2025-04-16 RX ORDER — MELATONIN 5 MG
3 TABLET ORAL AT BEDTIME
Refills: 0 | Status: DISCONTINUED | OUTPATIENT
Start: 2025-04-16 | End: 2025-04-19

## 2025-04-16 RX ORDER — KETOROLAC TROMETHAMINE 30 MG/ML
15 INJECTION, SOLUTION INTRAMUSCULAR; INTRAVENOUS ONCE
Refills: 0 | Status: DISCONTINUED | OUTPATIENT
Start: 2025-04-16 | End: 2025-04-16

## 2025-04-16 RX ORDER — METHADONE HCL 10 MG
40 TABLET ORAL DAILY
Refills: 0 | Status: DISCONTINUED | OUTPATIENT
Start: 2025-04-17 | End: 2025-04-17

## 2025-04-16 RX ORDER — HYDROMORPHONE/SOD CHLOR,ISO/PF 2 MG/10 ML
0.5 SYRINGE (ML) INJECTION ONCE
Refills: 0 | Status: DISCONTINUED | OUTPATIENT
Start: 2025-04-16 | End: 2025-04-16

## 2025-04-16 RX ORDER — CEFAZOLIN SODIUM IN 0.9 % NACL 3 G/100 ML
2000 INTRAVENOUS SOLUTION, PIGGYBACK (ML) INTRAVENOUS ONCE
Refills: 0 | Status: DISCONTINUED | OUTPATIENT
Start: 2025-04-16 | End: 2025-04-16

## 2025-04-16 RX ORDER — ENOXAPARIN SODIUM 100 MG/ML
40 INJECTION SUBCUTANEOUS EVERY 24 HOURS
Refills: 0 | Status: DISCONTINUED | OUTPATIENT
Start: 2025-04-16 | End: 2025-04-17

## 2025-04-16 RX ORDER — LORAZEPAM 4 MG/ML
1 VIAL (ML) INJECTION
Refills: 0 | Status: DISCONTINUED | OUTPATIENT
Start: 2025-04-16 | End: 2025-04-19

## 2025-04-16 RX ORDER — CARVEDILOL 3.12 MG/1
3.12 TABLET, FILM COATED ORAL EVERY 12 HOURS
Refills: 0 | Status: DISCONTINUED | OUTPATIENT
Start: 2025-04-16 | End: 2025-04-19

## 2025-04-16 RX ORDER — AMPICILLIN SODIUM AND SULBACTAM SODIUM 1; .5 G/1; G/1
3 INJECTION, POWDER, FOR SOLUTION INTRAMUSCULAR; INTRAVENOUS ONCE
Refills: 0 | Status: COMPLETED | OUTPATIENT
Start: 2025-04-16 | End: 2025-04-16

## 2025-04-16 RX ORDER — MAGNESIUM, ALUMINUM HYDROXIDE 200-200 MG
30 TABLET,CHEWABLE ORAL EVERY 4 HOURS
Refills: 0 | Status: DISCONTINUED | OUTPATIENT
Start: 2025-04-16 | End: 2025-04-19

## 2025-04-16 RX ADMIN — Medication 0.5 MILLIGRAM(S): at 13:41

## 2025-04-16 RX ADMIN — Medication 40 MILLIGRAM(S): at 06:06

## 2025-04-16 RX ADMIN — Medication 250 MILLIGRAM(S): at 03:26

## 2025-04-16 RX ADMIN — Medication 250 MILLIGRAM(S): at 13:17

## 2025-04-16 RX ADMIN — AMPICILLIN SODIUM AND SULBACTAM SODIUM 200 GRAM(S): 1; .5 INJECTION, POWDER, FOR SOLUTION INTRAMUSCULAR; INTRAVENOUS at 17:18

## 2025-04-16 RX ADMIN — KETOROLAC TROMETHAMINE 15 MILLIGRAM(S): 30 INJECTION, SOLUTION INTRAMUSCULAR; INTRAVENOUS at 03:37

## 2025-04-16 RX ADMIN — CARVEDILOL 3.12 MILLIGRAM(S): 3.12 TABLET, FILM COATED ORAL at 17:19

## 2025-04-16 RX ADMIN — FOLIC ACID 1 MILLIGRAM(S): 1 TABLET ORAL at 11:10

## 2025-04-16 RX ADMIN — Medication 100 MILLIGRAM(S): at 11:10

## 2025-04-16 RX ADMIN — Medication 4 MILLIGRAM(S): at 11:10

## 2025-04-16 RX ADMIN — Medication 650 MILLIGRAM(S): at 11:46

## 2025-04-16 RX ADMIN — Medication 2000 MILLILITER(S): at 03:22

## 2025-04-16 RX ADMIN — Medication 650 MILLIGRAM(S): at 06:05

## 2025-04-16 RX ADMIN — Medication 40 MILLIGRAM(S): at 06:05

## 2025-04-16 RX ADMIN — ENOXAPARIN SODIUM 40 MILLIGRAM(S): 100 INJECTION SUBCUTANEOUS at 11:10

## 2025-04-16 RX ADMIN — SODIUM CHLORIDE 75 MILLILITER(S): 9 INJECTION, SOLUTION INTRAVENOUS at 13:04

## 2025-04-16 RX ADMIN — Medication 1 APPLICATION(S): at 17:19

## 2025-04-16 RX ADMIN — Medication 1 APPLICATION(S): at 12:21

## 2025-04-16 RX ADMIN — Medication 650 MILLIGRAM(S): at 21:15

## 2025-04-16 RX ADMIN — Medication 650 MILLIGRAM(S): at 21:45

## 2025-04-16 RX ADMIN — CARVEDILOL 3.12 MILLIGRAM(S): 3.12 TABLET, FILM COATED ORAL at 06:06

## 2025-04-16 RX ADMIN — Medication 25 GRAM(S): at 13:55

## 2025-04-16 RX ADMIN — Medication 3 MILLIGRAM(S): at 21:30

## 2025-04-16 RX ADMIN — Medication 1 MILLIGRAM(S): at 13:17

## 2025-04-16 RX ADMIN — AMPICILLIN SODIUM AND SULBACTAM SODIUM 200 GRAM(S): 1; .5 INJECTION, POWDER, FOR SOLUTION INTRAMUSCULAR; INTRAVENOUS at 23:20

## 2025-04-16 RX ADMIN — KETOROLAC TROMETHAMINE 15 MILLIGRAM(S): 30 INJECTION, SOLUTION INTRAMUSCULAR; INTRAVENOUS at 04:15

## 2025-04-16 RX ADMIN — Medication 1 TABLET(S): at 11:11

## 2025-04-17 LAB
ALBUMIN SERPL ELPH-MCNC: 3.4 G/DL — LOW (ref 3.5–5.2)
ALP SERPL-CCNC: 385 U/L — HIGH (ref 30–115)
ALT FLD-CCNC: 48 U/L — HIGH (ref 0–41)
ANION GAP SERPL CALC-SCNC: 9 MMOL/L — SIGNIFICANT CHANGE UP (ref 7–14)
ANISOCYTOSIS BLD QL: SLIGHT — SIGNIFICANT CHANGE UP
APTT BLD: 31 SEC — SIGNIFICANT CHANGE UP (ref 27–39.2)
AST SERPL-CCNC: 169 U/L — HIGH (ref 0–41)
BASOPHILS # BLD AUTO: 0.05 K/UL — SIGNIFICANT CHANGE UP (ref 0–0.2)
BASOPHILS NFR BLD AUTO: 0.9 % — SIGNIFICANT CHANGE UP (ref 0–1)
BILIRUB SERPL-MCNC: 4.4 MG/DL — HIGH (ref 0.2–1.2)
BUN SERPL-MCNC: 13 MG/DL — SIGNIFICANT CHANGE UP (ref 10–20)
CALCIUM SERPL-MCNC: 8.8 MG/DL — SIGNIFICANT CHANGE UP (ref 8.4–10.5)
CHLORIDE SERPL-SCNC: 98 MMOL/L — SIGNIFICANT CHANGE UP (ref 98–110)
CO2 SERPL-SCNC: 28 MMOL/L — SIGNIFICANT CHANGE UP (ref 17–32)
CREAT SERPL-MCNC: 0.5 MG/DL — LOW (ref 0.7–1.5)
EGFR: 125 ML/MIN/1.73M2 — SIGNIFICANT CHANGE UP
EGFR: 125 ML/MIN/1.73M2 — SIGNIFICANT CHANGE UP
EOSINOPHIL # BLD AUTO: 0 K/UL — SIGNIFICANT CHANGE UP (ref 0–0.7)
EOSINOPHIL NFR BLD AUTO: 0 % — SIGNIFICANT CHANGE UP (ref 0–8)
GLUCOSE SERPL-MCNC: 106 MG/DL — HIGH (ref 70–99)
GRAM STN FLD: ABNORMAL
HAV IGM SER-ACNC: SIGNIFICANT CHANGE UP
HBV CORE IGM SER-ACNC: SIGNIFICANT CHANGE UP
HBV SURFACE AG SER-ACNC: SIGNIFICANT CHANGE UP
HCT VFR BLD CALC: 27.4 % — LOW (ref 42–52)
HCV AB S/CO SERPL IA: 0.15 S/CO — SIGNIFICANT CHANGE UP (ref 0–0.79)
HCV AB SERPL-IMP: SIGNIFICANT CHANGE UP
HGB BLD-MCNC: 9.3 G/DL — LOW (ref 14–18)
INR BLD: 1.32 RATIO — HIGH (ref 0.65–1.3)
LDH SERPL L TO P-CCNC: 239 U/L — SIGNIFICANT CHANGE UP (ref 50–242)
LYMPHOCYTES # BLD AUTO: 0.62 K/UL — LOW (ref 1.2–3.4)
LYMPHOCYTES # BLD AUTO: 12.3 % — LOW (ref 20.5–51.1)
MACROCYTES BLD QL: SLIGHT — SIGNIFICANT CHANGE UP
MAGNESIUM SERPL-MCNC: 1.6 MG/DL — LOW (ref 1.8–2.4)
MANUAL SMEAR VERIFICATION: SIGNIFICANT CHANGE UP
MCHC RBC-ENTMCNC: 32.1 PG — HIGH (ref 27–31)
MCHC RBC-ENTMCNC: 33.9 G/DL — SIGNIFICANT CHANGE UP (ref 32–37)
MCV RBC AUTO: 94.5 FL — HIGH (ref 80–94)
MONOCYTES # BLD AUTO: 0.27 K/UL — SIGNIFICANT CHANGE UP (ref 0.1–0.6)
MONOCYTES NFR BLD AUTO: 5.3 % — SIGNIFICANT CHANGE UP (ref 1.7–9.3)
MRSA PCR RESULT.: NEGATIVE — SIGNIFICANT CHANGE UP
NEUTROPHILS # BLD AUTO: 3.96 K/UL — SIGNIFICANT CHANGE UP (ref 1.4–6.5)
NEUTROPHILS NFR BLD AUTO: 78.9 % — HIGH (ref 42.2–75.2)
PHOSPHATE SERPL-MCNC: 1.9 MG/DL — LOW (ref 2.1–4.9)
PLAT MORPH BLD: NORMAL — SIGNIFICANT CHANGE UP
PLATELET # BLD AUTO: 39 K/UL — LOW (ref 130–400)
PMV BLD: 10.7 FL — HIGH (ref 7.4–10.4)
POIKILOCYTOSIS BLD QL AUTO: SLIGHT — SIGNIFICANT CHANGE UP
POTASSIUM SERPL-MCNC: 3.8 MMOL/L — SIGNIFICANT CHANGE UP (ref 3.5–5)
POTASSIUM SERPL-SCNC: 3.8 MMOL/L — SIGNIFICANT CHANGE UP (ref 3.5–5)
PROT SERPL-MCNC: 6.3 G/DL — SIGNIFICANT CHANGE UP (ref 6–8)
PROTHROM AB SERPL-ACNC: 15.7 SEC — HIGH (ref 9.95–12.87)
RBC # BLD: 2.9 M/UL — LOW (ref 4.7–6.1)
RBC # FLD: 16.5 % — HIGH (ref 11.5–14.5)
RBC BLD AUTO: ABNORMAL
SODIUM SERPL-SCNC: 135 MMOL/L — SIGNIFICANT CHANGE UP (ref 135–146)
SPECIMEN SOURCE: SIGNIFICANT CHANGE UP
TARGETS BLD QL SMEAR: SLIGHT — SIGNIFICANT CHANGE UP
URATE SERPL-MCNC: 1.8 MG/DL — LOW (ref 3.4–8.8)
VANCOMYCIN FLD-MCNC: 9.4 UG/ML — SIGNIFICANT CHANGE UP (ref 5–10)
VARIANT LYMPHS # BLD: 2.6 % — SIGNIFICANT CHANGE UP (ref 0–5)
VARIANT LYMPHS NFR BLD MANUAL: 2.6 % — SIGNIFICANT CHANGE UP (ref 0–5)
WBC # BLD: 5.02 K/UL — SIGNIFICANT CHANGE UP (ref 4.8–10.8)
WBC # FLD AUTO: 5.02 K/UL — SIGNIFICANT CHANGE UP (ref 4.8–10.8)

## 2025-04-17 PROCEDURE — 99233 SBSQ HOSP IP/OBS HIGH 50: CPT | Mod: GC

## 2025-04-17 RX ORDER — MAGNESIUM OXIDE 400 MG
400 TABLET ORAL
Refills: 0 | Status: COMPLETED | OUTPATIENT
Start: 2025-04-17 | End: 2025-04-18

## 2025-04-17 RX ORDER — VANCOMYCIN HCL IN 5 % DEXTROSE 1.5G/250ML
1250 PLASTIC BAG, INJECTION (ML) INTRAVENOUS EVERY 12 HOURS
Refills: 0 | Status: DISCONTINUED | OUTPATIENT
Start: 2025-04-17 | End: 2025-04-18

## 2025-04-17 RX ORDER — METHADONE HCL 10 MG
40 TABLET ORAL EVERY 24 HOURS
Refills: 0 | Status: DISCONTINUED | OUTPATIENT
Start: 2025-04-17 | End: 2025-04-19

## 2025-04-17 RX ORDER — SOD PHOS DI, MONO/K PHOS MONO 250 MG
1 TABLET ORAL ONCE
Refills: 0 | Status: COMPLETED | OUTPATIENT
Start: 2025-04-17 | End: 2025-04-17

## 2025-04-17 RX ADMIN — Medication 4 MILLIGRAM(S): at 08:24

## 2025-04-17 RX ADMIN — Medication 250 MILLIGRAM(S): at 13:35

## 2025-04-17 RX ADMIN — AMPICILLIN SODIUM AND SULBACTAM SODIUM 200 GRAM(S): 1; .5 INJECTION, POWDER, FOR SOLUTION INTRAMUSCULAR; INTRAVENOUS at 23:06

## 2025-04-17 RX ADMIN — Medication 1 APPLICATION(S): at 11:12

## 2025-04-17 RX ADMIN — Medication 650 MILLIGRAM(S): at 04:15

## 2025-04-17 RX ADMIN — ENOXAPARIN SODIUM 40 MILLIGRAM(S): 100 INJECTION SUBCUTANEOUS at 11:10

## 2025-04-17 RX ADMIN — Medication 400 MILLIGRAM(S): at 13:13

## 2025-04-17 RX ADMIN — Medication 166.67 MILLIGRAM(S): at 21:44

## 2025-04-17 RX ADMIN — Medication 650 MILLIGRAM(S): at 03:25

## 2025-04-17 RX ADMIN — Medication 400 MILLIGRAM(S): at 17:47

## 2025-04-17 RX ADMIN — Medication 3 MILLIGRAM(S): at 01:08

## 2025-04-17 RX ADMIN — Medication 40 MILLIGRAM(S): at 11:10

## 2025-04-17 RX ADMIN — Medication 250 MILLIGRAM(S): at 03:26

## 2025-04-17 RX ADMIN — Medication 40 MILLIGRAM(S): at 06:12

## 2025-04-17 RX ADMIN — Medication 650 MILLIGRAM(S): at 08:26

## 2025-04-17 RX ADMIN — Medication 1 APPLICATION(S): at 17:50

## 2025-04-17 RX ADMIN — FOLIC ACID 1 MILLIGRAM(S): 1 TABLET ORAL at 11:10

## 2025-04-17 RX ADMIN — AMPICILLIN SODIUM AND SULBACTAM SODIUM 200 GRAM(S): 1; .5 INJECTION, POWDER, FOR SOLUTION INTRAMUSCULAR; INTRAVENOUS at 11:11

## 2025-04-17 RX ADMIN — Medication 1 APPLICATION(S): at 09:34

## 2025-04-17 RX ADMIN — Medication 105 MILLIGRAM(S): at 13:04

## 2025-04-17 RX ADMIN — CARVEDILOL 3.12 MILLIGRAM(S): 3.12 TABLET, FILM COATED ORAL at 17:48

## 2025-04-17 RX ADMIN — AMPICILLIN SODIUM AND SULBACTAM SODIUM 200 GRAM(S): 1; .5 INJECTION, POWDER, FOR SOLUTION INTRAMUSCULAR; INTRAVENOUS at 06:12

## 2025-04-17 RX ADMIN — AMPICILLIN SODIUM AND SULBACTAM SODIUM 200 GRAM(S): 1; .5 INJECTION, POWDER, FOR SOLUTION INTRAMUSCULAR; INTRAVENOUS at 17:47

## 2025-04-17 RX ADMIN — Medication 1 TABLET(S): at 11:11

## 2025-04-17 RX ADMIN — Medication 1 PACKET(S): at 13:00

## 2025-04-17 RX ADMIN — Medication 3 MILLIGRAM(S): at 23:06

## 2025-04-17 RX ADMIN — Medication 400 MILLIGRAM(S): at 13:00

## 2025-04-17 RX ADMIN — Medication 650 MILLIGRAM(S): at 18:27

## 2025-04-17 RX ADMIN — CARVEDILOL 3.12 MILLIGRAM(S): 3.12 TABLET, FILM COATED ORAL at 06:12

## 2025-04-18 ENCOUNTER — TRANSCRIPTION ENCOUNTER (OUTPATIENT)
Age: 50
End: 2025-04-18

## 2025-04-18 LAB
ALBUMIN SERPL ELPH-MCNC: 3.5 G/DL — SIGNIFICANT CHANGE UP (ref 3.5–5.2)
ALP SERPL-CCNC: 412 U/L — HIGH (ref 30–115)
ALT FLD-CCNC: 59 U/L — HIGH (ref 0–41)
ANION GAP SERPL CALC-SCNC: 10 MMOL/L — SIGNIFICANT CHANGE UP (ref 7–14)
APTT BLD: 33.3 SEC — SIGNIFICANT CHANGE UP (ref 27–39.2)
AST SERPL-CCNC: 228 U/L — HIGH (ref 0–41)
BASOPHILS # BLD AUTO: 0.04 K/UL — SIGNIFICANT CHANGE UP (ref 0–0.2)
BASOPHILS NFR BLD AUTO: 1.1 % — HIGH (ref 0–1)
BILIRUB SERPL-MCNC: 4.1 MG/DL — HIGH (ref 0.2–1.2)
BUN SERPL-MCNC: 7 MG/DL — LOW (ref 10–20)
CALCIUM SERPL-MCNC: 9.3 MG/DL — SIGNIFICANT CHANGE UP (ref 8.4–10.5)
CHLORIDE SERPL-SCNC: 96 MMOL/L — LOW (ref 98–110)
CO2 SERPL-SCNC: 30 MMOL/L — SIGNIFICANT CHANGE UP (ref 17–32)
CREAT SERPL-MCNC: 0.6 MG/DL — LOW (ref 0.7–1.5)
EGFR: 118 ML/MIN/1.73M2 — SIGNIFICANT CHANGE UP
EGFR: 118 ML/MIN/1.73M2 — SIGNIFICANT CHANGE UP
EOSINOPHIL # BLD AUTO: 0.06 K/UL — SIGNIFICANT CHANGE UP (ref 0–0.7)
EOSINOPHIL NFR BLD AUTO: 1.6 % — SIGNIFICANT CHANGE UP (ref 0–8)
FOLATE SERPL-MCNC: 6.4 NG/ML — SIGNIFICANT CHANGE UP
GLUCOSE BLDC GLUCOMTR-MCNC: 103 MG/DL — HIGH (ref 70–99)
GLUCOSE BLDC GLUCOMTR-MCNC: 111 MG/DL — HIGH (ref 70–99)
GLUCOSE BLDC GLUCOMTR-MCNC: 132 MG/DL — HIGH (ref 70–99)
GLUCOSE BLDC GLUCOMTR-MCNC: 151 MG/DL — HIGH (ref 70–99)
GLUCOSE SERPL-MCNC: 94 MG/DL — SIGNIFICANT CHANGE UP (ref 70–99)
HAPTOGLOB SERPL-MCNC: 97 MG/DL — SIGNIFICANT CHANGE UP (ref 34–200)
HCT VFR BLD CALC: 29.3 % — LOW (ref 42–52)
HGB BLD-MCNC: 9.9 G/DL — LOW (ref 14–18)
HIV 1+2 AB+HIV1 P24 AG SERPL QL IA: SIGNIFICANT CHANGE UP
IMM GRANULOCYTES NFR BLD AUTO: 0.3 % — SIGNIFICANT CHANGE UP (ref 0.1–0.3)
INR BLD: 1.41 RATIO — HIGH (ref 0.65–1.3)
LACTATE SERPL-SCNC: 1.1 MMOL/L — SIGNIFICANT CHANGE UP (ref 0.7–2)
LYMPHOCYTES # BLD AUTO: 0.93 K/UL — LOW (ref 1.2–3.4)
LYMPHOCYTES # BLD AUTO: 24.9 % — SIGNIFICANT CHANGE UP (ref 20.5–51.1)
MAGNESIUM SERPL-MCNC: 1.5 MG/DL — LOW (ref 1.8–2.4)
MCHC RBC-ENTMCNC: 31.9 PG — HIGH (ref 27–31)
MCHC RBC-ENTMCNC: 33.8 G/DL — SIGNIFICANT CHANGE UP (ref 32–37)
MCV RBC AUTO: 94.5 FL — HIGH (ref 80–94)
MONOCYTES # BLD AUTO: 0.26 K/UL — SIGNIFICANT CHANGE UP (ref 0.1–0.6)
MONOCYTES NFR BLD AUTO: 7 % — SIGNIFICANT CHANGE UP (ref 1.7–9.3)
NEUTROPHILS # BLD AUTO: 2.44 K/UL — SIGNIFICANT CHANGE UP (ref 1.4–6.5)
NEUTROPHILS NFR BLD AUTO: 65.1 % — SIGNIFICANT CHANGE UP (ref 42.2–75.2)
NRBC BLD AUTO-RTO: 0 /100 WBCS — SIGNIFICANT CHANGE UP (ref 0–0)
PHOSPHATE SERPL-MCNC: 2.4 MG/DL — SIGNIFICANT CHANGE UP (ref 2.1–4.9)
PLATELET # BLD AUTO: 41 K/UL — LOW (ref 130–400)
PMV BLD: 10.9 FL — HIGH (ref 7.4–10.4)
POTASSIUM SERPL-MCNC: 3.9 MMOL/L — SIGNIFICANT CHANGE UP (ref 3.5–5)
POTASSIUM SERPL-SCNC: 3.9 MMOL/L — SIGNIFICANT CHANGE UP (ref 3.5–5)
PROT SERPL-MCNC: 6.8 G/DL — SIGNIFICANT CHANGE UP (ref 6–8)
PROTHROM AB SERPL-ACNC: 16.7 SEC — HIGH (ref 9.95–12.87)
RBC # BLD: 3.1 M/UL — LOW (ref 4.7–6.1)
RBC # FLD: 16.3 % — HIGH (ref 11.5–14.5)
SODIUM SERPL-SCNC: 136 MMOL/L — SIGNIFICANT CHANGE UP (ref 135–146)
VIT B12 SERPL-MCNC: 846 PG/ML — SIGNIFICANT CHANGE UP (ref 232–1245)
WBC # BLD: 3.74 K/UL — LOW (ref 4.8–10.8)
WBC # FLD AUTO: 3.74 K/UL — LOW (ref 4.8–10.8)

## 2025-04-18 PROCEDURE — 99233 SBSQ HOSP IP/OBS HIGH 50: CPT | Mod: GC

## 2025-04-18 RX ORDER — MAGNESIUM SULFATE 500 MG/ML
2 SYRINGE (ML) INJECTION
Refills: 0 | Status: COMPLETED | OUTPATIENT
Start: 2025-04-18 | End: 2025-04-18

## 2025-04-18 RX ORDER — GABAPENTIN 400 MG/1
100 CAPSULE ORAL THREE TIMES A DAY
Refills: 0 | Status: DISCONTINUED | OUTPATIENT
Start: 2025-04-18 | End: 2025-04-19

## 2025-04-18 RX ADMIN — Medication 1 APPLICATION(S): at 06:30

## 2025-04-18 RX ADMIN — CARVEDILOL 3.12 MILLIGRAM(S): 3.12 TABLET, FILM COATED ORAL at 17:07

## 2025-04-18 RX ADMIN — CARVEDILOL 3.12 MILLIGRAM(S): 3.12 TABLET, FILM COATED ORAL at 06:25

## 2025-04-18 RX ADMIN — GABAPENTIN 100 MILLIGRAM(S): 400 CAPSULE ORAL at 21:04

## 2025-04-18 RX ADMIN — Medication 3 MILLIGRAM(S): at 23:20

## 2025-04-18 RX ADMIN — Medication 1 APPLICATION(S): at 17:08

## 2025-04-18 RX ADMIN — Medication 650 MILLIGRAM(S): at 00:05

## 2025-04-18 RX ADMIN — Medication 1 TABLET(S): at 11:09

## 2025-04-18 RX ADMIN — Medication 1 APPLICATION(S): at 11:09

## 2025-04-18 RX ADMIN — GABAPENTIN 100 MILLIGRAM(S): 400 CAPSULE ORAL at 13:08

## 2025-04-18 RX ADMIN — AMPICILLIN SODIUM AND SULBACTAM SODIUM 200 GRAM(S): 1; .5 INJECTION, POWDER, FOR SOLUTION INTRAMUSCULAR; INTRAVENOUS at 11:09

## 2025-04-18 RX ADMIN — Medication 25 GRAM(S): at 13:07

## 2025-04-18 RX ADMIN — Medication 40 MILLIGRAM(S): at 09:02

## 2025-04-18 RX ADMIN — Medication 40 MILLIGRAM(S): at 06:25

## 2025-04-18 RX ADMIN — Medication 400 MILLIGRAM(S): at 08:17

## 2025-04-18 RX ADMIN — Medication 650 MILLIGRAM(S): at 01:07

## 2025-04-18 RX ADMIN — Medication 650 MILLIGRAM(S): at 07:15

## 2025-04-18 RX ADMIN — AMPICILLIN SODIUM AND SULBACTAM SODIUM 200 GRAM(S): 1; .5 INJECTION, POWDER, FOR SOLUTION INTRAMUSCULAR; INTRAVENOUS at 06:24

## 2025-04-18 RX ADMIN — Medication 25 GRAM(S): at 11:09

## 2025-04-18 RX ADMIN — Medication 105 MILLIGRAM(S): at 08:17

## 2025-04-18 RX ADMIN — AMPICILLIN SODIUM AND SULBACTAM SODIUM 200 GRAM(S): 1; .5 INJECTION, POWDER, FOR SOLUTION INTRAMUSCULAR; INTRAVENOUS at 23:20

## 2025-04-18 RX ADMIN — FOLIC ACID 1 MILLIGRAM(S): 1 TABLET ORAL at 11:09

## 2025-04-18 RX ADMIN — Medication 650 MILLIGRAM(S): at 06:25

## 2025-04-18 RX ADMIN — AMPICILLIN SODIUM AND SULBACTAM SODIUM 200 GRAM(S): 1; .5 INJECTION, POWDER, FOR SOLUTION INTRAMUSCULAR; INTRAVENOUS at 17:07

## 2025-04-19 ENCOUNTER — TRANSCRIPTION ENCOUNTER (OUTPATIENT)
Age: 50
End: 2025-04-19

## 2025-04-19 VITALS
SYSTOLIC BLOOD PRESSURE: 107 MMHG | DIASTOLIC BLOOD PRESSURE: 72 MMHG | TEMPERATURE: 98 F | HEART RATE: 72 BPM | RESPIRATION RATE: 18 BRPM | OXYGEN SATURATION: 97 %

## 2025-04-19 LAB
-  AMOXICILLIN/CLAVULANIC ACID: SIGNIFICANT CHANGE UP
-  AMPICILLIN/SULBACTAM: SIGNIFICANT CHANGE UP
-  AMPICILLIN: SIGNIFICANT CHANGE UP
-  AZTREONAM: SIGNIFICANT CHANGE UP
-  CEFAZOLIN: SIGNIFICANT CHANGE UP
-  CEFEPIME: SIGNIFICANT CHANGE UP
-  CEFOXITIN: SIGNIFICANT CHANGE UP
-  CEFTRIAXONE: SIGNIFICANT CHANGE UP
-  CIPROFLOXACIN: SIGNIFICANT CHANGE UP
-  CLINDAMYCIN: SIGNIFICANT CHANGE UP
-  CLINDAMYCIN: SIGNIFICANT CHANGE UP
-  ERTAPENEM: SIGNIFICANT CHANGE UP
-  ERYTHROMYCIN: SIGNIFICANT CHANGE UP
-  ERYTHROMYCIN: SIGNIFICANT CHANGE UP
-  GENTAMICIN: SIGNIFICANT CHANGE UP
-  IMIPENEM: SIGNIFICANT CHANGE UP
-  LEVOFLOXACIN: SIGNIFICANT CHANGE UP
-  MEROPENEM: SIGNIFICANT CHANGE UP
-  OXACILLIN: SIGNIFICANT CHANGE UP
-  OXACILLIN: SIGNIFICANT CHANGE UP
-  PENICILLIN: SIGNIFICANT CHANGE UP
-  PIPERACILLIN/TAZOBACTAM: SIGNIFICANT CHANGE UP
-  RIFAMPIN: SIGNIFICANT CHANGE UP
-  RIFAMPIN: SIGNIFICANT CHANGE UP
-  TETRACYCLINE: SIGNIFICANT CHANGE UP
-  TETRACYCLINE: SIGNIFICANT CHANGE UP
-  TOBRAMYCIN: SIGNIFICANT CHANGE UP
-  TRIMETHOPRIM/SULFAMETHOXAZOLE: SIGNIFICANT CHANGE UP
-  VANCOMYCIN: SIGNIFICANT CHANGE UP
-  VANCOMYCIN: SIGNIFICANT CHANGE UP
ALBUMIN SERPL ELPH-MCNC: 3.4 G/DL — LOW (ref 3.5–5.2)
ALP SERPL-CCNC: 439 U/L — HIGH (ref 30–115)
ALT FLD-CCNC: 76 U/L — HIGH (ref 0–41)
ANION GAP SERPL CALC-SCNC: 10 MMOL/L — SIGNIFICANT CHANGE UP (ref 7–14)
AST SERPL-CCNC: 275 U/L — HIGH (ref 0–41)
BASOPHILS # BLD AUTO: 0.05 K/UL — SIGNIFICANT CHANGE UP (ref 0–0.2)
BASOPHILS NFR BLD AUTO: 1.3 % — HIGH (ref 0–1)
BILIRUB DIRECT SERPL-MCNC: 1.8 MG/DL — HIGH (ref 0–0.3)
BILIRUB INDIRECT FLD-MCNC: 0.9 MG/DL — SIGNIFICANT CHANGE UP (ref 0.2–1.2)
BILIRUB SERPL-MCNC: 2.7 MG/DL — HIGH (ref 0.2–1.2)
BUN SERPL-MCNC: 9 MG/DL — LOW (ref 10–20)
CALCIUM SERPL-MCNC: 9.1 MG/DL — SIGNIFICANT CHANGE UP (ref 8.4–10.5)
CHLORIDE SERPL-SCNC: 99 MMOL/L — SIGNIFICANT CHANGE UP (ref 98–110)
CO2 SERPL-SCNC: 28 MMOL/L — SIGNIFICANT CHANGE UP (ref 17–32)
CREAT SERPL-MCNC: 0.7 MG/DL — SIGNIFICANT CHANGE UP (ref 0.7–1.5)
EGFR: 113 ML/MIN/1.73M2 — SIGNIFICANT CHANGE UP
EGFR: 113 ML/MIN/1.73M2 — SIGNIFICANT CHANGE UP
EOSINOPHIL # BLD AUTO: 0.1 K/UL — SIGNIFICANT CHANGE UP (ref 0–0.7)
EOSINOPHIL NFR BLD AUTO: 2.6 % — SIGNIFICANT CHANGE UP (ref 0–8)
GLUCOSE SERPL-MCNC: 97 MG/DL — SIGNIFICANT CHANGE UP (ref 70–99)
HCT VFR BLD CALC: 30 % — LOW (ref 42–52)
HGB BLD-MCNC: 9.9 G/DL — LOW (ref 14–18)
IMM GRANULOCYTES NFR BLD AUTO: 0.8 % — HIGH (ref 0.1–0.3)
LYMPHOCYTES # BLD AUTO: 1.08 K/UL — LOW (ref 1.2–3.4)
LYMPHOCYTES # BLD AUTO: 28.3 % — SIGNIFICANT CHANGE UP (ref 20.5–51.1)
MAGNESIUM SERPL-MCNC: 1.9 MG/DL — SIGNIFICANT CHANGE UP (ref 1.8–2.4)
MCHC RBC-ENTMCNC: 31.5 PG — HIGH (ref 27–31)
MCHC RBC-ENTMCNC: 33 G/DL — SIGNIFICANT CHANGE UP (ref 32–37)
MCV RBC AUTO: 95.5 FL — HIGH (ref 80–94)
METHOD TYPE: SIGNIFICANT CHANGE UP
MONOCYTES # BLD AUTO: 0.36 K/UL — SIGNIFICANT CHANGE UP (ref 0.1–0.6)
MONOCYTES NFR BLD AUTO: 9.4 % — HIGH (ref 1.7–9.3)
NEUTROPHILS # BLD AUTO: 2.2 K/UL — SIGNIFICANT CHANGE UP (ref 1.4–6.5)
NEUTROPHILS NFR BLD AUTO: 57.6 % — SIGNIFICANT CHANGE UP (ref 42.2–75.2)
NRBC BLD AUTO-RTO: 0 /100 WBCS — SIGNIFICANT CHANGE UP (ref 0–0)
PLATELET # BLD AUTO: 48 K/UL — LOW (ref 130–400)
PMV BLD: 11.9 FL — HIGH (ref 7.4–10.4)
POTASSIUM SERPL-MCNC: 4.3 MMOL/L — SIGNIFICANT CHANGE UP (ref 3.5–5)
POTASSIUM SERPL-SCNC: 4.3 MMOL/L — SIGNIFICANT CHANGE UP (ref 3.5–5)
PROT SERPL-MCNC: 7.4 G/DL — SIGNIFICANT CHANGE UP (ref 6–8)
RBC # BLD: 3.14 M/UL — LOW (ref 4.7–6.1)
RBC # FLD: 17.1 % — HIGH (ref 11.5–14.5)
SODIUM SERPL-SCNC: 137 MMOL/L — SIGNIFICANT CHANGE UP (ref 135–146)
WBC # BLD: 3.82 K/UL — LOW (ref 4.8–10.8)
WBC # FLD AUTO: 3.82 K/UL — LOW (ref 4.8–10.8)

## 2025-04-19 PROCEDURE — 99239 HOSP IP/OBS DSCHRG MGMT >30: CPT

## 2025-04-19 RX ORDER — GABAPENTIN 400 MG/1
1 CAPSULE ORAL
Qty: 42 | Refills: 0
Start: 2025-04-19 | End: 2025-05-02

## 2025-04-19 RX ORDER — AMOXICILLIN AND CLAVULANATE POTASSIUM 500; 125 MG/1; MG/1
1 TABLET, FILM COATED ORAL
Qty: 12 | Refills: 0
Start: 2025-04-19 | End: 2025-04-24

## 2025-04-19 RX ORDER — SILVER SULFADIAZINE 1 %
1 CREAM (GRAM) TOPICAL
Qty: 0 | Refills: 0 | DISCHARGE
Start: 2025-04-19

## 2025-04-19 RX ORDER — FOLIC ACID 1 MG/1
1 TABLET ORAL
Qty: 30 | Refills: 3
Start: 2025-04-19 | End: 2025-08-16

## 2025-04-19 RX ORDER — B1/B2/B3/B5/B6/B12/VIT C/FOLIC 500-0.5 MG
1 TABLET ORAL
Qty: 30 | Refills: 0
Start: 2025-04-19 | End: 2025-05-18

## 2025-04-19 RX ORDER — CARVEDILOL 3.12 MG/1
1 TABLET, FILM COATED ORAL
Qty: 60 | Refills: 1
Start: 2025-04-19 | End: 2025-06-17

## 2025-04-19 RX ORDER — GABAPENTIN 400 MG/1
1 CAPSULE ORAL
Qty: 12 | Refills: 0
Start: 2025-04-19

## 2025-04-19 RX ADMIN — Medication 1 TABLET(S): at 11:04

## 2025-04-19 RX ADMIN — CARVEDILOL 3.12 MILLIGRAM(S): 3.12 TABLET, FILM COATED ORAL at 05:08

## 2025-04-19 RX ADMIN — AMPICILLIN SODIUM AND SULBACTAM SODIUM 200 GRAM(S): 1; .5 INJECTION, POWDER, FOR SOLUTION INTRAMUSCULAR; INTRAVENOUS at 11:05

## 2025-04-19 RX ADMIN — Medication 40 MILLIGRAM(S): at 05:08

## 2025-04-19 RX ADMIN — Medication 105 MILLIGRAM(S): at 08:46

## 2025-04-19 RX ADMIN — GABAPENTIN 100 MILLIGRAM(S): 400 CAPSULE ORAL at 05:09

## 2025-04-19 RX ADMIN — Medication 1 APPLICATION(S): at 11:05

## 2025-04-19 RX ADMIN — FOLIC ACID 1 MILLIGRAM(S): 1 TABLET ORAL at 11:04

## 2025-04-19 RX ADMIN — Medication 1 APPLICATION(S): at 05:11

## 2025-04-19 RX ADMIN — Medication 40 MILLIGRAM(S): at 08:45

## 2025-04-19 RX ADMIN — AMPICILLIN SODIUM AND SULBACTAM SODIUM 200 GRAM(S): 1; .5 INJECTION, POWDER, FOR SOLUTION INTRAMUSCULAR; INTRAVENOUS at 05:08

## 2025-04-20 LAB
-  CEFTRIAXONE: SIGNIFICANT CHANGE UP
-  CLINDAMYCIN: SIGNIFICANT CHANGE UP
-  LEVOFLOXACIN: SIGNIFICANT CHANGE UP
-  PENICILLIN: SIGNIFICANT CHANGE UP
-  TETRACYCLINE: SIGNIFICANT CHANGE UP
-  VANCOMYCIN: SIGNIFICANT CHANGE UP
METHOD TYPE: SIGNIFICANT CHANGE UP

## 2025-04-21 LAB
CULTURE RESULTS: SIGNIFICANT CHANGE UP
CULTURE RESULTS: SIGNIFICANT CHANGE UP
SPECIMEN SOURCE: SIGNIFICANT CHANGE UP
SPECIMEN SOURCE: SIGNIFICANT CHANGE UP

## 2025-04-22 LAB
CULTURE RESULTS: ABNORMAL
SPECIMEN SOURCE: SIGNIFICANT CHANGE UP

## 2025-04-23 LAB
CULTURE RESULTS: ABNORMAL
ORGANISM # SPEC MICROSCOPIC CNT: ABNORMAL
ORGANISM # SPEC MICROSCOPIC CNT: SIGNIFICANT CHANGE UP
SPECIMEN SOURCE: SIGNIFICANT CHANGE UP
